# Patient Record
Sex: FEMALE | Race: WHITE | NOT HISPANIC OR LATINO | Employment: PART TIME | URBAN - METROPOLITAN AREA
[De-identification: names, ages, dates, MRNs, and addresses within clinical notes are randomized per-mention and may not be internally consistent; named-entity substitution may affect disease eponyms.]

---

## 2017-01-04 ENCOUNTER — OFFICE VISIT - HEALTHEAST (OUTPATIENT)
Dept: FAMILY MEDICINE | Facility: CLINIC | Age: 42
End: 2017-01-04

## 2017-01-04 DIAGNOSIS — N39.0 UTI (URINARY TRACT INFECTION): ICD-10-CM

## 2017-01-04 DIAGNOSIS — E11.9 DM (DIABETES MELLITUS) (H): ICD-10-CM

## 2017-01-04 LAB — HBA1C MFR BLD: 5.7 % (ref 3.5–6.1)

## 2017-02-10 ENCOUNTER — OFFICE VISIT - HEALTHEAST (OUTPATIENT)
Dept: FAMILY MEDICINE | Facility: CLINIC | Age: 42
End: 2017-02-10

## 2017-02-10 DIAGNOSIS — R30.0 DYSURIA: ICD-10-CM

## 2017-03-01 ENCOUNTER — OFFICE VISIT - HEALTHEAST (OUTPATIENT)
Dept: FAMILY MEDICINE | Facility: CLINIC | Age: 42
End: 2017-03-01

## 2017-03-01 DIAGNOSIS — N39.0 RECURRENT UTI: ICD-10-CM

## 2017-03-01 DIAGNOSIS — E11.9 DM (DIABETES MELLITUS) (H): ICD-10-CM

## 2017-03-01 DIAGNOSIS — Z12.31 VISIT FOR SCREENING MAMMOGRAM: ICD-10-CM

## 2017-03-01 DIAGNOSIS — E78.5 DYSLIPIDEMIA: ICD-10-CM

## 2017-03-01 DIAGNOSIS — R80.9 PROTEINURIA: ICD-10-CM

## 2017-03-01 DIAGNOSIS — Z00.00 ROUTINE GENERAL MEDICAL EXAMINATION AT A HEALTH CARE FACILITY: ICD-10-CM

## 2017-03-01 LAB
CHOLEST SERPL-MCNC: 83 MG/DL
FASTING STATUS PATIENT QL REPORTED: YES
HDLC SERPL-MCNC: 30 MG/DL
LDLC SERPL CALC-MCNC: 31 MG/DL
TRIGL SERPL-MCNC: 109 MG/DL

## 2017-03-01 ASSESSMENT — MIFFLIN-ST. JEOR: SCORE: 1200.59

## 2017-03-02 ENCOUNTER — AMBULATORY - HEALTHEAST (OUTPATIENT)
Dept: FAMILY MEDICINE | Facility: CLINIC | Age: 42
End: 2017-03-02

## 2017-03-02 DIAGNOSIS — N39.0 RECURRENT URINARY TRACT INFECTION: ICD-10-CM

## 2017-03-23 ENCOUNTER — COMMUNICATION - HEALTHEAST (OUTPATIENT)
Dept: FAMILY MEDICINE | Facility: CLINIC | Age: 42
End: 2017-03-23

## 2017-03-23 DIAGNOSIS — E11.9 DIABETES MELLITUS (H): ICD-10-CM

## 2017-03-23 DIAGNOSIS — I10 HTN (HYPERTENSION): ICD-10-CM

## 2017-05-10 ENCOUNTER — COMMUNICATION - HEALTHEAST (OUTPATIENT)
Dept: MAMMOGRAPHY | Facility: CLINIC | Age: 42
End: 2017-05-10

## 2017-05-10 ENCOUNTER — OFFICE VISIT - HEALTHEAST (OUTPATIENT)
Dept: FAMILY MEDICINE | Facility: CLINIC | Age: 42
End: 2017-05-10

## 2017-05-10 ENCOUNTER — HOSPITAL ENCOUNTER (OUTPATIENT)
Dept: MAMMOGRAPHY | Facility: CLINIC | Age: 42
Discharge: HOME | End: 2017-05-10
Attending: FAMILY MEDICINE

## 2017-05-10 DIAGNOSIS — Q90.9 DOWN'S SYNDROME: ICD-10-CM

## 2017-05-10 DIAGNOSIS — Z12.31 VISIT FOR SCREENING MAMMOGRAM: ICD-10-CM

## 2017-05-10 DIAGNOSIS — E78.5 DYSLIPIDEMIA: ICD-10-CM

## 2017-05-10 DIAGNOSIS — E11.9 DM (DIABETES MELLITUS) (H): ICD-10-CM

## 2017-05-10 DIAGNOSIS — R80.9 PROTEINURIA: ICD-10-CM

## 2017-05-10 DIAGNOSIS — N39.0 RECURRENT UTI: ICD-10-CM

## 2017-05-11 LAB — HBA1C MFR BLD: 5.7 % (ref 4.2–6.1)

## 2017-05-12 ENCOUNTER — HOSPITAL ENCOUNTER (OUTPATIENT)
Dept: MAMMOGRAPHY | Facility: CLINIC | Age: 42
Discharge: HOME | End: 2017-05-12
Attending: FAMILY MEDICINE

## 2017-05-12 DIAGNOSIS — N63.0 BREAST MASS: ICD-10-CM

## 2017-09-01 ENCOUNTER — COMMUNICATION - HEALTHEAST (OUTPATIENT)
Dept: FAMILY MEDICINE | Facility: CLINIC | Age: 42
End: 2017-09-01

## 2017-09-01 DIAGNOSIS — R52 PAIN: ICD-10-CM

## 2017-09-20 ENCOUNTER — OFFICE VISIT - HEALTHEAST (OUTPATIENT)
Dept: FAMILY MEDICINE | Facility: CLINIC | Age: 42
End: 2017-09-20

## 2017-09-20 ENCOUNTER — HOSPITAL ENCOUNTER (OUTPATIENT)
Dept: LAB | Age: 42
Setting detail: SPECIMEN
Discharge: HOME | End: 2017-09-20

## 2017-09-20 DIAGNOSIS — E11.9 DM (DIABETES MELLITUS) (H): ICD-10-CM

## 2017-09-20 DIAGNOSIS — N39.0 RECURRENT UTI: ICD-10-CM

## 2017-09-20 DIAGNOSIS — Z23 NEED FOR IMMUNIZATION AGAINST INFLUENZA: ICD-10-CM

## 2017-09-20 LAB — HBA1C MFR BLD: 6.3 % (ref 3.5–6.1)

## 2017-09-25 ENCOUNTER — AMBULATORY - HEALTHEAST (OUTPATIENT)
Dept: FAMILY MEDICINE | Facility: CLINIC | Age: 42
End: 2017-09-25

## 2017-09-25 DIAGNOSIS — N39.0 UTI (URINARY TRACT INFECTION): ICD-10-CM

## 2017-11-02 ENCOUNTER — OFFICE VISIT - HEALTHEAST (OUTPATIENT)
Dept: FAMILY MEDICINE | Facility: CLINIC | Age: 42
End: 2017-11-02

## 2017-11-02 ENCOUNTER — COMMUNICATION - HEALTHEAST (OUTPATIENT)
Dept: FAMILY MEDICINE | Facility: CLINIC | Age: 42
End: 2017-11-02

## 2017-11-02 DIAGNOSIS — Q90.9 DOWN'S SYNDROME: ICD-10-CM

## 2017-11-02 DIAGNOSIS — E78.5 DYSLIPIDEMIA: ICD-10-CM

## 2017-11-02 DIAGNOSIS — N39.0 UTI (URINARY TRACT INFECTION): ICD-10-CM

## 2017-11-02 DIAGNOSIS — E11.9 DM2 (DIABETES MELLITUS, TYPE 2) (H): ICD-10-CM

## 2017-11-02 DIAGNOSIS — R80.9 PROTEINURIA: ICD-10-CM

## 2017-12-01 ENCOUNTER — COMMUNICATION - HEALTHEAST (OUTPATIENT)
Dept: NURSING | Facility: CLINIC | Age: 42
End: 2017-12-01

## 2017-12-07 ENCOUNTER — COMMUNICATION - HEALTHEAST (OUTPATIENT)
Dept: NURSING | Facility: CLINIC | Age: 42
End: 2017-12-07

## 2017-12-28 ENCOUNTER — OFFICE VISIT - HEALTHEAST (OUTPATIENT)
Dept: FAMILY MEDICINE | Facility: CLINIC | Age: 42
End: 2017-12-28

## 2017-12-28 DIAGNOSIS — E11.9 DM (DIABETES MELLITUS) (H): ICD-10-CM

## 2017-12-28 LAB — HBA1C MFR BLD: 6.8 % (ref 3.5–6.1)

## 2018-04-03 ENCOUNTER — OFFICE VISIT - HEALTHEAST (OUTPATIENT)
Dept: FAMILY MEDICINE | Facility: CLINIC | Age: 43
End: 2018-04-03

## 2018-04-03 DIAGNOSIS — R35.0 URINARY FREQUENCY: ICD-10-CM

## 2018-04-03 DIAGNOSIS — E11.9 DM (DIABETES MELLITUS) (H): ICD-10-CM

## 2018-04-03 LAB
ALBUMIN UR-MCNC: NEGATIVE MG/DL
APPEARANCE UR: CLEAR
BILIRUB UR QL STRIP: NEGATIVE
COLOR UR AUTO: YELLOW
CREAT UR-MCNC: 128 MG/DL
GLUCOSE UR STRIP-MCNC: NEGATIVE MG/DL
HBA1C MFR BLD: 7.1 % (ref 3.5–6.1)
HGB UR QL STRIP: NEGATIVE
KETONES UR STRIP-MCNC: NEGATIVE MG/DL
LEUKOCYTE ESTERASE UR QL STRIP: NEGATIVE
MICROALBUMIN UR-MCNC: 0.76 MG/DL (ref 0–1.99)
MICROALBUMIN/CREAT UR: 5.9 MG/G
NITRATE UR QL: NEGATIVE
PH UR STRIP: 5.5 [PH] (ref 5–8)
SP GR UR STRIP: 1.02 (ref 1–1.03)
UROBILINOGEN UR STRIP-ACNC: NORMAL

## 2018-05-30 ENCOUNTER — COMMUNICATION - HEALTHEAST (OUTPATIENT)
Dept: FAMILY MEDICINE | Facility: CLINIC | Age: 43
End: 2018-05-30

## 2018-05-30 DIAGNOSIS — E11.9 DIABETES (H): ICD-10-CM

## 2018-08-22 ENCOUNTER — OFFICE VISIT - HEALTHEAST (OUTPATIENT)
Dept: FAMILY MEDICINE | Facility: CLINIC | Age: 43
End: 2018-08-22

## 2018-08-22 DIAGNOSIS — E11.9 DM (DIABETES MELLITUS) (H): ICD-10-CM

## 2018-08-22 DIAGNOSIS — I10 HTN (HYPERTENSION): ICD-10-CM

## 2018-08-22 DIAGNOSIS — E66.9 OBESITY: ICD-10-CM

## 2018-08-22 DIAGNOSIS — E78.5 DYSLIPIDEMIA: ICD-10-CM

## 2018-08-22 LAB
ALBUMIN SERPL-MCNC: 4.1 G/DL (ref 3.5–5)
ALP SERPL-CCNC: 61 U/L (ref 45–120)
ALT SERPL W P-5'-P-CCNC: 22 U/L (ref 0–45)
ANION GAP SERPL CALCULATED.3IONS-SCNC: 8 MMOL/L (ref 5–18)
AST SERPL W P-5'-P-CCNC: 20 U/L (ref 0–40)
BILIRUB SERPL-MCNC: 0.6 MG/DL (ref 0–1)
BUN SERPL-MCNC: 12 MG/DL (ref 8–22)
CALCIUM SERPL-MCNC: 9.4 MG/DL (ref 8.5–10.5)
CHLORIDE BLD-SCNC: 104 MMOL/L (ref 98–107)
CHOLEST SERPL-MCNC: 101 MG/DL
CO2 SERPL-SCNC: 27 MMOL/L (ref 22–31)
CREAT SERPL-MCNC: 0.67 MG/DL (ref 0.6–1.1)
FASTING STATUS PATIENT QL REPORTED: YES
GFR SERPL CREATININE-BSD FRML MDRD: >60 ML/MIN/1.73M2
GLUCOSE BLD-MCNC: 168 MG/DL (ref 70–125)
HBA1C MFR BLD: 6.9 % (ref 3.5–6.1)
HDLC SERPL-MCNC: 31 MG/DL
LDLC SERPL CALC-MCNC: 39 MG/DL
POTASSIUM BLD-SCNC: 4.3 MMOL/L (ref 3.5–5)
PROT SERPL-MCNC: 6.6 G/DL (ref 6–8)
SODIUM SERPL-SCNC: 139 MMOL/L (ref 136–145)
TRIGL SERPL-MCNC: 156 MG/DL

## 2018-10-25 ENCOUNTER — COMMUNICATION - HEALTHEAST (OUTPATIENT)
Dept: FAMILY MEDICINE | Facility: CLINIC | Age: 43
End: 2018-10-25

## 2018-10-25 DIAGNOSIS — R80.9 PROTEINURIA: ICD-10-CM

## 2018-12-19 ENCOUNTER — COMMUNICATION - HEALTHEAST (OUTPATIENT)
Dept: FAMILY MEDICINE | Facility: CLINIC | Age: 43
End: 2018-12-19

## 2018-12-19 DIAGNOSIS — E11.9 DIABETES (H): ICD-10-CM

## 2019-01-16 ENCOUNTER — OFFICE VISIT - HEALTHEAST (OUTPATIENT)
Dept: FAMILY MEDICINE | Facility: CLINIC | Age: 44
End: 2019-01-16

## 2019-01-16 DIAGNOSIS — E78.5 DYSLIPIDEMIA: ICD-10-CM

## 2019-01-16 DIAGNOSIS — E11.9 DM (DIABETES MELLITUS) (H): ICD-10-CM

## 2019-01-16 DIAGNOSIS — Z23 ENCOUNTER FOR ADMINISTRATION OF VACCINE: ICD-10-CM

## 2019-01-16 DIAGNOSIS — Q90.9 DOWN'S SYNDROME: ICD-10-CM

## 2019-01-16 LAB — HBA1C MFR BLD: 6.8 % (ref 3.5–6.1)

## 2019-01-20 ENCOUNTER — COMMUNICATION - HEALTHEAST (OUTPATIENT)
Dept: FAMILY MEDICINE | Facility: CLINIC | Age: 44
End: 2019-01-20

## 2019-01-20 DIAGNOSIS — R52 PAIN: ICD-10-CM

## 2019-01-21 ENCOUNTER — COMMUNICATION - HEALTHEAST (OUTPATIENT)
Dept: FAMILY MEDICINE | Facility: CLINIC | Age: 44
End: 2019-01-21

## 2019-01-21 DIAGNOSIS — E78.5 DYSLIPIDEMIA: ICD-10-CM

## 2019-01-24 ENCOUNTER — COMMUNICATION - HEALTHEAST (OUTPATIENT)
Dept: FAMILY MEDICINE | Facility: CLINIC | Age: 44
End: 2019-01-24

## 2019-01-24 DIAGNOSIS — E11.9 TYPE 2 DIABETES MELLITUS WITHOUT COMPLICATION, WITHOUT LONG-TERM CURRENT USE OF INSULIN (H): ICD-10-CM

## 2019-02-06 ENCOUNTER — AMBULATORY - HEALTHEAST (OUTPATIENT)
Dept: OTHER | Facility: CLINIC | Age: 44
End: 2019-02-06

## 2019-02-06 ENCOUNTER — AMBULATORY - HEALTHEAST (OUTPATIENT)
Dept: PODIATRY | Facility: CLINIC | Age: 44
End: 2019-02-06

## 2019-02-06 ENCOUNTER — OFFICE VISIT - HEALTHEAST (OUTPATIENT)
Dept: PODIATRY | Facility: CLINIC | Age: 44
End: 2019-02-06

## 2019-02-06 DIAGNOSIS — B35.1 NAIL FUNGUS: ICD-10-CM

## 2019-02-06 DIAGNOSIS — E11.9 TYPE 2 DIABETES MELLITUS WITHOUT COMPLICATION, WITHOUT LONG-TERM CURRENT USE OF INSULIN (H): ICD-10-CM

## 2019-02-06 DIAGNOSIS — L60.2 ONYCHAUXIS: ICD-10-CM

## 2019-02-06 DIAGNOSIS — M20.10 HALLUX VALGUS, UNSPECIFIED LATERALITY: ICD-10-CM

## 2019-02-06 DIAGNOSIS — M21.6X1 PRONATION DEFORMITY OF BOTH FEET: ICD-10-CM

## 2019-02-06 DIAGNOSIS — M21.6X2 PRONATION DEFORMITY OF BOTH FEET: ICD-10-CM

## 2019-02-25 ENCOUNTER — RECORDS - HEALTHEAST (OUTPATIENT)
Dept: ADMINISTRATIVE | Facility: OTHER | Age: 44
End: 2019-02-25

## 2019-02-27 ENCOUNTER — COMMUNICATION - HEALTHEAST (OUTPATIENT)
Dept: FAMILY MEDICINE | Facility: CLINIC | Age: 44
End: 2019-02-27

## 2019-02-27 DIAGNOSIS — E11.9 DM2 (DIABETES MELLITUS, TYPE 2) (H): ICD-10-CM

## 2019-04-03 ENCOUNTER — COMMUNICATION - HEALTHEAST (OUTPATIENT)
Dept: FAMILY MEDICINE | Facility: CLINIC | Age: 44
End: 2019-04-03

## 2019-04-03 DIAGNOSIS — E11.9 DM2 (DIABETES MELLITUS, TYPE 2) (H): ICD-10-CM

## 2019-04-03 DIAGNOSIS — E11.9 DM (DIABETES MELLITUS) (H): ICD-10-CM

## 2019-04-05 ENCOUNTER — COMMUNICATION - HEALTHEAST (OUTPATIENT)
Dept: FAMILY MEDICINE | Facility: CLINIC | Age: 44
End: 2019-04-05

## 2019-04-05 DIAGNOSIS — E11.9 DM (DIABETES MELLITUS) (H): ICD-10-CM

## 2019-04-17 ENCOUNTER — OFFICE VISIT - HEALTHEAST (OUTPATIENT)
Dept: FAMILY MEDICINE | Facility: CLINIC | Age: 44
End: 2019-04-17

## 2019-04-17 DIAGNOSIS — E11.9 DM (DIABETES MELLITUS) (H): ICD-10-CM

## 2019-04-17 DIAGNOSIS — E78.5 DYSLIPIDEMIA: ICD-10-CM

## 2019-04-17 DIAGNOSIS — I10 ESSENTIAL HYPERTENSION: ICD-10-CM

## 2019-04-17 DIAGNOSIS — Z12.4 CERVICAL CANCER SCREENING: ICD-10-CM

## 2019-04-17 DIAGNOSIS — Q90.9 DOWN'S SYNDROME: ICD-10-CM

## 2019-04-17 DIAGNOSIS — F70 MILD INTELLECTUAL DISABILITIES: ICD-10-CM

## 2019-04-17 DIAGNOSIS — Z12.31 ENCOUNTER FOR SCREENING MAMMOGRAM FOR BREAST CANCER: ICD-10-CM

## 2019-04-17 DIAGNOSIS — Z00.00 ROUTINE GENERAL MEDICAL EXAMINATION AT A HEALTH CARE FACILITY: ICD-10-CM

## 2019-04-17 LAB
ALBUMIN SERPL-MCNC: 4 G/DL (ref 3.5–5)
ALP SERPL-CCNC: 57 U/L (ref 45–120)
ALT SERPL W P-5'-P-CCNC: 27 U/L (ref 0–45)
ANION GAP SERPL CALCULATED.3IONS-SCNC: 10 MMOL/L (ref 5–18)
AST SERPL W P-5'-P-CCNC: 19 U/L (ref 0–40)
BILIRUB SERPL-MCNC: 0.5 MG/DL (ref 0–1)
BUN SERPL-MCNC: 11 MG/DL (ref 8–22)
CALCIUM SERPL-MCNC: 9.3 MG/DL (ref 8.5–10.5)
CHLORIDE BLD-SCNC: 102 MMOL/L (ref 98–107)
CHOLEST SERPL-MCNC: 95 MG/DL
CO2 SERPL-SCNC: 26 MMOL/L (ref 22–31)
CREAT SERPL-MCNC: 0.7 MG/DL (ref 0.6–1.1)
CREAT UR-MCNC: 82.3 MG/DL
FASTING STATUS PATIENT QL REPORTED: YES
GFR SERPL CREATININE-BSD FRML MDRD: >60 ML/MIN/1.73M2
GLUCOSE BLD-MCNC: 162 MG/DL (ref 70–125)
HBA1C MFR BLD: 7.2 % (ref 3.5–6.1)
HDLC SERPL-MCNC: 28 MG/DL
LDLC SERPL CALC-MCNC: 34 MG/DL
MICROALBUMIN UR-MCNC: 1.15 MG/DL (ref 0–1.99)
MICROALBUMIN/CREAT UR: 14 MG/G
POTASSIUM BLD-SCNC: 4.2 MMOL/L (ref 3.5–5)
PROT SERPL-MCNC: 6.7 G/DL (ref 6–8)
SODIUM SERPL-SCNC: 138 MMOL/L (ref 136–145)
TRIGL SERPL-MCNC: 166 MG/DL

## 2019-04-17 ASSESSMENT — MIFFLIN-ST. JEOR: SCORE: 1250.48

## 2019-04-18 ENCOUNTER — COMMUNICATION - HEALTHEAST (OUTPATIENT)
Dept: NURSING | Facility: CLINIC | Age: 44
End: 2019-04-18

## 2019-04-18 LAB
HPV SOURCE: NORMAL
HUMAN PAPILLOMA VIRUS 16 DNA: NEGATIVE
HUMAN PAPILLOMA VIRUS 18 DNA: NEGATIVE
HUMAN PAPILLOMA VIRUS FINAL DIAGNOSIS: NORMAL
HUMAN PAPILLOMA VIRUS OTHER HR: NEGATIVE
SPECIMEN DESCRIPTION: NORMAL

## 2019-04-22 ENCOUNTER — COMMUNICATION - HEALTHEAST (OUTPATIENT)
Dept: NURSING | Facility: CLINIC | Age: 44
End: 2019-04-22

## 2019-04-25 LAB
BKR LAB AP ABNORMAL BLEEDING: NO
BKR LAB AP BIRTH CONTROL/HORMONES: NORMAL
BKR LAB AP CERVICAL APPEARANCE: NORMAL
BKR LAB AP GYN ADEQUACY: NORMAL
BKR LAB AP GYN INTERPRETATION: NORMAL
BKR LAB AP HPV REFLEX: NORMAL
BKR LAB AP LMP: NORMAL
BKR LAB AP PATIENT STATUS: NORMAL
BKR LAB AP PREVIOUS ABNORMAL: NORMAL
BKR LAB AP PREVIOUS NORMAL: 2013
HIGH RISK?: NO
PATH REPORT.COMMENTS IMP SPEC: NORMAL
RESULT FLAG (HE HISTORICAL CONVERSION): NORMAL

## 2019-04-29 ENCOUNTER — COMMUNICATION - HEALTHEAST (OUTPATIENT)
Dept: NURSING | Facility: CLINIC | Age: 44
End: 2019-04-29

## 2019-05-13 ENCOUNTER — OFFICE VISIT - HEALTHEAST (OUTPATIENT)
Dept: FAMILY MEDICINE | Facility: CLINIC | Age: 44
End: 2019-05-13

## 2019-05-13 DIAGNOSIS — R35.0 INCREASED FREQUENCY OF URINATION: ICD-10-CM

## 2019-05-14 ENCOUNTER — AMBULATORY - HEALTHEAST (OUTPATIENT)
Dept: LAB | Facility: CLINIC | Age: 44
End: 2019-05-14

## 2019-05-14 DIAGNOSIS — R35.0 INCREASED FREQUENCY OF URINATION: ICD-10-CM

## 2019-05-14 LAB
ALBUMIN UR-MCNC: NEGATIVE MG/DL
APPEARANCE UR: ABNORMAL
BACTERIA #/AREA URNS HPF: ABNORMAL HPF
BILIRUB UR QL STRIP: NEGATIVE
COLOR UR AUTO: YELLOW
GLUCOSE UR STRIP-MCNC: NEGATIVE MG/DL
HGB UR QL STRIP: NEGATIVE
KETONES UR STRIP-MCNC: NEGATIVE MG/DL
LEUKOCYTE ESTERASE UR QL STRIP: ABNORMAL
NITRATE UR QL: POSITIVE
PH UR STRIP: 7 [PH] (ref 5–8)
RBC #/AREA URNS AUTO: ABNORMAL HPF
SP GR UR STRIP: 1.01 (ref 1–1.03)
SQUAMOUS #/AREA URNS AUTO: ABNORMAL LPF
UROBILINOGEN UR STRIP-ACNC: ABNORMAL
WBC #/AREA URNS AUTO: ABNORMAL HPF

## 2019-05-16 ENCOUNTER — AMBULATORY - HEALTHEAST (OUTPATIENT)
Dept: FAMILY MEDICINE | Facility: CLINIC | Age: 44
End: 2019-05-16

## 2019-05-16 DIAGNOSIS — N30.00 ACUTE CYSTITIS WITHOUT HEMATURIA: ICD-10-CM

## 2019-05-16 LAB — BACTERIA SPEC CULT: ABNORMAL

## 2019-05-30 ENCOUNTER — COMMUNICATION - HEALTHEAST (OUTPATIENT)
Dept: FAMILY MEDICINE | Facility: CLINIC | Age: 44
End: 2019-05-30

## 2019-05-30 ENCOUNTER — HOSPITAL ENCOUNTER (OUTPATIENT)
Dept: MAMMOGRAPHY | Facility: CLINIC | Age: 44
Discharge: HOME | End: 2019-05-30
Attending: FAMILY MEDICINE

## 2019-05-30 DIAGNOSIS — Z12.31 ENCOUNTER FOR SCREENING MAMMOGRAM FOR BREAST CANCER: ICD-10-CM

## 2019-05-30 DIAGNOSIS — E11.9 DM2 (DIABETES MELLITUS, TYPE 2) (H): ICD-10-CM

## 2019-06-06 ENCOUNTER — AMBULATORY - HEALTHEAST (OUTPATIENT)
Dept: NURSING | Facility: CLINIC | Age: 44
End: 2019-06-06

## 2019-06-06 ENCOUNTER — COMMUNICATION - HEALTHEAST (OUTPATIENT)
Dept: NURSING | Facility: CLINIC | Age: 44
End: 2019-06-06

## 2019-06-06 DIAGNOSIS — Z71.89 COUNSELING AND COORDINATION OF CARE: ICD-10-CM

## 2019-06-11 ENCOUNTER — AMBULATORY - HEALTHEAST (OUTPATIENT)
Dept: NURSING | Facility: CLINIC | Age: 44
End: 2019-06-11

## 2019-06-11 ENCOUNTER — AMBULATORY - HEALTHEAST (OUTPATIENT)
Dept: CARE COORDINATION | Facility: CLINIC | Age: 44
End: 2019-06-11

## 2019-06-11 ENCOUNTER — COMMUNICATION - HEALTHEAST (OUTPATIENT)
Dept: NURSING | Facility: CLINIC | Age: 44
End: 2019-06-11

## 2019-06-11 ENCOUNTER — OFFICE VISIT - HEALTHEAST (OUTPATIENT)
Dept: FAMILY MEDICINE | Facility: CLINIC | Age: 44
End: 2019-06-11

## 2019-06-11 DIAGNOSIS — N39.0 URINARY TRACT INFECTION WITHOUT HEMATURIA, SITE UNSPECIFIED: ICD-10-CM

## 2019-06-11 DIAGNOSIS — Z71.89 COUNSELING AND COORDINATION OF CARE: ICD-10-CM

## 2019-06-24 ENCOUNTER — COMMUNICATION - HEALTHEAST (OUTPATIENT)
Dept: NURSING | Facility: CLINIC | Age: 44
End: 2019-06-24

## 2019-06-26 ENCOUNTER — OFFICE VISIT - HEALTHEAST (OUTPATIENT)
Dept: FAMILY MEDICINE | Facility: CLINIC | Age: 44
End: 2019-06-26

## 2019-06-26 DIAGNOSIS — N39.0 RECURRENT UTI: ICD-10-CM

## 2019-06-28 ENCOUNTER — AMBULATORY - HEALTHEAST (OUTPATIENT)
Dept: FAMILY MEDICINE | Facility: CLINIC | Age: 44
End: 2019-06-28

## 2019-06-28 DIAGNOSIS — N39.0 URINARY TRACT INFECTION WITHOUT HEMATURIA, SITE UNSPECIFIED: ICD-10-CM

## 2019-06-28 LAB — BACTERIA SPEC CULT: ABNORMAL

## 2019-07-04 ENCOUNTER — COMMUNICATION - HEALTHEAST (OUTPATIENT)
Dept: SCHEDULING | Facility: CLINIC | Age: 44
End: 2019-07-04

## 2019-07-08 ENCOUNTER — COMMUNICATION - HEALTHEAST (OUTPATIENT)
Dept: NURSING | Facility: CLINIC | Age: 44
End: 2019-07-08

## 2019-07-11 ENCOUNTER — COMMUNICATION - HEALTHEAST (OUTPATIENT)
Dept: NURSING | Facility: CLINIC | Age: 44
End: 2019-07-11

## 2019-07-23 ENCOUNTER — COMMUNICATION - HEALTHEAST (OUTPATIENT)
Dept: NURSING | Facility: CLINIC | Age: 44
End: 2019-07-23

## 2019-08-14 ENCOUNTER — OFFICE VISIT - HEALTHEAST (OUTPATIENT)
Dept: FAMILY MEDICINE | Facility: CLINIC | Age: 44
End: 2019-08-14

## 2019-08-14 DIAGNOSIS — N30.00 ACUTE CYSTITIS WITHOUT HEMATURIA: ICD-10-CM

## 2019-08-14 DIAGNOSIS — E11.9 DM (DIABETES MELLITUS) (H): ICD-10-CM

## 2019-08-14 LAB — HBA1C MFR BLD: 6.5 % (ref 3.5–6)

## 2019-08-14 ASSESSMENT — MIFFLIN-ST. JEOR: SCORE: 1213.63

## 2019-08-16 LAB — BACTERIA SPEC CULT: ABNORMAL

## 2019-08-20 ENCOUNTER — AMBULATORY - HEALTHEAST (OUTPATIENT)
Dept: FAMILY MEDICINE | Facility: CLINIC | Age: 44
End: 2019-08-20

## 2019-08-20 DIAGNOSIS — N39.0 URINARY TRACT INFECTION WITHOUT HEMATURIA, SITE UNSPECIFIED: ICD-10-CM

## 2019-08-23 ENCOUNTER — COMMUNICATION - HEALTHEAST (OUTPATIENT)
Dept: NURSING | Facility: CLINIC | Age: 44
End: 2019-08-23

## 2019-08-26 ENCOUNTER — COMMUNICATION - HEALTHEAST (OUTPATIENT)
Dept: NURSING | Facility: CLINIC | Age: 44
End: 2019-08-26

## 2019-08-28 ENCOUNTER — RECORDS - HEALTHEAST (OUTPATIENT)
Dept: ADMINISTRATIVE | Facility: OTHER | Age: 44
End: 2019-08-28

## 2019-08-29 ENCOUNTER — COMMUNICATION - HEALTHEAST (OUTPATIENT)
Dept: NURSING | Facility: CLINIC | Age: 44
End: 2019-08-29

## 2019-08-30 ENCOUNTER — COMMUNICATION - HEALTHEAST (OUTPATIENT)
Dept: CARE COORDINATION | Facility: CLINIC | Age: 44
End: 2019-08-30

## 2019-09-03 ENCOUNTER — COMMUNICATION - HEALTHEAST (OUTPATIENT)
Dept: NURSING | Facility: CLINIC | Age: 44
End: 2019-09-03

## 2019-09-03 ENCOUNTER — COMMUNICATION - HEALTHEAST (OUTPATIENT)
Dept: FAMILY MEDICINE | Facility: CLINIC | Age: 44
End: 2019-09-03

## 2019-09-03 DIAGNOSIS — E11.9 DIABETES (H): ICD-10-CM

## 2019-09-05 ENCOUNTER — OFFICE VISIT - HEALTHEAST (OUTPATIENT)
Dept: FAMILY MEDICINE | Facility: CLINIC | Age: 44
End: 2019-09-05

## 2019-09-05 DIAGNOSIS — N30.00 ACUTE CYSTITIS WITHOUT HEMATURIA: ICD-10-CM

## 2019-09-05 LAB
ALBUMIN UR-MCNC: NEGATIVE MG/DL
APPEARANCE UR: CLEAR
BILIRUB UR QL STRIP: NEGATIVE
COLOR UR AUTO: YELLOW
GLUCOSE UR STRIP-MCNC: NEGATIVE MG/DL
HGB UR QL STRIP: NEGATIVE
KETONES UR STRIP-MCNC: NEGATIVE MG/DL
LEUKOCYTE ESTERASE UR QL STRIP: NEGATIVE
NITRATE UR QL: NEGATIVE
PH UR STRIP: 7 [PH] (ref 5–8)
SP GR UR STRIP: 1.02 (ref 1–1.03)
UROBILINOGEN UR STRIP-ACNC: NORMAL

## 2019-09-07 LAB — BACTERIA SPEC CULT: NORMAL

## 2019-09-09 ENCOUNTER — RECORDS - HEALTHEAST (OUTPATIENT)
Dept: HEALTH INFORMATION MANAGEMENT | Facility: CLINIC | Age: 44
End: 2019-09-09

## 2019-09-10 ENCOUNTER — COMMUNICATION - HEALTHEAST (OUTPATIENT)
Dept: FAMILY MEDICINE | Facility: CLINIC | Age: 44
End: 2019-09-10

## 2019-10-06 ENCOUNTER — COMMUNICATION - HEALTHEAST (OUTPATIENT)
Dept: FAMILY MEDICINE | Facility: CLINIC | Age: 44
End: 2019-10-06

## 2019-10-06 DIAGNOSIS — E78.5 DYSLIPIDEMIA: ICD-10-CM

## 2019-10-15 ENCOUNTER — COMMUNICATION - HEALTHEAST (OUTPATIENT)
Dept: FAMILY MEDICINE | Facility: CLINIC | Age: 44
End: 2019-10-15

## 2019-10-15 DIAGNOSIS — R80.9 PROTEINURIA: ICD-10-CM

## 2019-10-29 ENCOUNTER — COMMUNICATION - HEALTHEAST (OUTPATIENT)
Dept: FAMILY MEDICINE | Facility: CLINIC | Age: 44
End: 2019-10-29

## 2019-10-29 DIAGNOSIS — Q90.9 DOWN'S SYNDROME: ICD-10-CM

## 2019-11-01 ENCOUNTER — RECORDS - HEALTHEAST (OUTPATIENT)
Dept: GENERAL RADIOLOGY | Facility: CLINIC | Age: 44
End: 2019-11-01

## 2019-11-01 DIAGNOSIS — Q90.9 DOWN SYNDROME, UNSPECIFIED: ICD-10-CM

## 2019-11-04 ENCOUNTER — COMMUNICATION - HEALTHEAST (OUTPATIENT)
Dept: FAMILY MEDICINE | Facility: CLINIC | Age: 44
End: 2019-11-04

## 2019-11-05 ENCOUNTER — COMMUNICATION - HEALTHEAST (OUTPATIENT)
Dept: CARE COORDINATION | Facility: CLINIC | Age: 44
End: 2019-11-05

## 2019-11-05 ENCOUNTER — COMMUNICATION - HEALTHEAST (OUTPATIENT)
Dept: NURSING | Facility: CLINIC | Age: 44
End: 2019-11-05

## 2019-11-06 ENCOUNTER — COMMUNICATION - HEALTHEAST (OUTPATIENT)
Dept: NURSING | Facility: CLINIC | Age: 44
End: 2019-11-06

## 2019-11-21 ENCOUNTER — COMMUNICATION - HEALTHEAST (OUTPATIENT)
Dept: LAB | Facility: CLINIC | Age: 44
End: 2019-11-21

## 2019-11-21 DIAGNOSIS — E11.9 TYPE 2 DIABETES MELLITUS WITHOUT COMPLICATION, WITHOUT LONG-TERM CURRENT USE OF INSULIN (H): ICD-10-CM

## 2019-11-21 DIAGNOSIS — N39.0 RECURRENT UTI: ICD-10-CM

## 2019-11-27 ENCOUNTER — AMBULATORY - HEALTHEAST (OUTPATIENT)
Dept: LAB | Facility: CLINIC | Age: 44
End: 2019-11-27

## 2019-11-27 DIAGNOSIS — E11.9 TYPE 2 DIABETES MELLITUS WITHOUT COMPLICATION, WITHOUT LONG-TERM CURRENT USE OF INSULIN (H): ICD-10-CM

## 2019-11-27 DIAGNOSIS — N39.0 RECURRENT UTI: ICD-10-CM

## 2019-11-27 LAB — HBA1C MFR BLD: 6.1 % (ref 3.5–6)

## 2019-11-28 LAB — BACTERIA SPEC CULT: NO GROWTH

## 2020-02-05 ENCOUNTER — OFFICE VISIT - HEALTHEAST (OUTPATIENT)
Dept: FAMILY MEDICINE | Facility: CLINIC | Age: 45
End: 2020-02-05

## 2020-02-05 DIAGNOSIS — N39.0 RECURRENT UTI: ICD-10-CM

## 2020-02-05 DIAGNOSIS — E11.9 TYPE 2 DIABETES MELLITUS WITHOUT COMPLICATION, WITHOUT LONG-TERM CURRENT USE OF INSULIN (H): ICD-10-CM

## 2020-02-05 LAB
ALBUMIN UR-MCNC: NEGATIVE MG/DL
APPEARANCE UR: CLEAR
BILIRUB UR QL STRIP: NEGATIVE
COLOR UR AUTO: YELLOW
CREAT UR-MCNC: 103.8 MG/DL
GLUCOSE UR STRIP-MCNC: NEGATIVE MG/DL
HGB UR QL STRIP: NEGATIVE
KETONES UR STRIP-MCNC: NEGATIVE MG/DL
LEUKOCYTE ESTERASE UR QL STRIP: NEGATIVE
MICROALBUMIN UR-MCNC: 0.54 MG/DL (ref 0–1.99)
MICROALBUMIN/CREAT UR: 5.2 MG/G
NITRATE UR QL: NEGATIVE
PH UR STRIP: 6.5 [PH] (ref 5–8)
SP GR UR STRIP: 1.02 (ref 1–1.03)
UROBILINOGEN UR STRIP-ACNC: NORMAL

## 2020-02-05 ASSESSMENT — MIFFLIN-ST. JEOR: SCORE: 1234.04

## 2020-02-07 ENCOUNTER — COMMUNICATION - HEALTHEAST (OUTPATIENT)
Dept: FAMILY MEDICINE | Facility: CLINIC | Age: 45
End: 2020-02-07

## 2020-02-07 DIAGNOSIS — N30.00 ACUTE CYSTITIS WITHOUT HEMATURIA: ICD-10-CM

## 2020-02-08 LAB — BACTERIA SPEC CULT: ABNORMAL

## 2020-03-26 ENCOUNTER — COMMUNICATION - HEALTHEAST (OUTPATIENT)
Dept: PODIATRY | Facility: CLINIC | Age: 45
End: 2020-03-26

## 2020-03-27 ENCOUNTER — COMMUNICATION - HEALTHEAST (OUTPATIENT)
Dept: OTHER | Facility: CLINIC | Age: 45
End: 2020-03-27

## 2020-03-27 ENCOUNTER — AMBULATORY - HEALTHEAST (OUTPATIENT)
Dept: PODIATRY | Facility: CLINIC | Age: 45
End: 2020-03-27

## 2020-03-27 DIAGNOSIS — M21.6X1 PRONATION DEFORMITY OF BOTH FEET: ICD-10-CM

## 2020-03-27 DIAGNOSIS — E11.9 TYPE 2 DIABETES MELLITUS WITHOUT COMPLICATION, WITHOUT LONG-TERM CURRENT USE OF INSULIN (H): ICD-10-CM

## 2020-03-27 DIAGNOSIS — M21.6X2 PRONATION DEFORMITY OF BOTH FEET: ICD-10-CM

## 2020-04-02 ENCOUNTER — OFFICE VISIT - HEALTHEAST (OUTPATIENT)
Dept: FAMILY MEDICINE | Facility: CLINIC | Age: 45
End: 2020-04-02

## 2020-04-02 DIAGNOSIS — E78.5 DYSLIPIDEMIA: ICD-10-CM

## 2020-04-02 DIAGNOSIS — E11.9 TYPE 2 DIABETES MELLITUS WITHOUT COMPLICATION, WITHOUT LONG-TERM CURRENT USE OF INSULIN (H): ICD-10-CM

## 2020-04-02 DIAGNOSIS — N39.0 RECURRENT UTI: ICD-10-CM

## 2020-04-03 ENCOUNTER — COMMUNICATION - HEALTHEAST (OUTPATIENT)
Dept: FAMILY MEDICINE | Facility: CLINIC | Age: 45
End: 2020-04-03

## 2020-04-03 DIAGNOSIS — E11.9 DM2 (DIABETES MELLITUS, TYPE 2) (H): ICD-10-CM

## 2020-05-26 ENCOUNTER — COMMUNICATION - HEALTHEAST (OUTPATIENT)
Dept: LAB | Facility: CLINIC | Age: 45
End: 2020-05-26

## 2020-05-26 ENCOUNTER — OFFICE VISIT - HEALTHEAST (OUTPATIENT)
Dept: FAMILY MEDICINE | Facility: CLINIC | Age: 45
End: 2020-05-26

## 2020-05-26 DIAGNOSIS — N39.0 URINARY TRACT INFECTION WITHOUT HEMATURIA, SITE UNSPECIFIED: ICD-10-CM

## 2020-05-26 DIAGNOSIS — H61.21 IMPACTED CERUMEN OF RIGHT EAR: ICD-10-CM

## 2020-05-26 DIAGNOSIS — Q90.9 DOWN'S SYNDROME: ICD-10-CM

## 2020-05-26 DIAGNOSIS — H93.8X1 PLUGGED FEELING IN EAR, RIGHT: ICD-10-CM

## 2020-05-26 DIAGNOSIS — R35.0 URINE FREQUENCY: ICD-10-CM

## 2020-05-26 DIAGNOSIS — E11.9 TYPE 2 DIABETES MELLITUS WITHOUT COMPLICATION, WITHOUT LONG-TERM CURRENT USE OF INSULIN (H): ICD-10-CM

## 2020-05-26 LAB
ALBUMIN UR-MCNC: NEGATIVE MG/DL
APPEARANCE UR: CLEAR
BILIRUB UR QL STRIP: NEGATIVE
COLOR UR AUTO: YELLOW
GLUCOSE UR STRIP-MCNC: NEGATIVE MG/DL
HBA1C MFR BLD: 6 % (ref 3.5–6)
HGB UR QL STRIP: NEGATIVE
KETONES UR STRIP-MCNC: NEGATIVE MG/DL
LEUKOCYTE ESTERASE UR QL STRIP: NEGATIVE
NITRATE UR QL: NEGATIVE
PH UR STRIP: 7 [PH] (ref 5–8)
SP GR UR STRIP: 1.02 (ref 1–1.03)
UROBILINOGEN UR STRIP-ACNC: NORMAL

## 2020-05-27 ENCOUNTER — COMMUNICATION - HEALTHEAST (OUTPATIENT)
Dept: FAMILY MEDICINE | Facility: CLINIC | Age: 45
End: 2020-05-27

## 2020-05-27 LAB — BACTERIA SPEC CULT: NO GROWTH

## 2020-06-02 ENCOUNTER — OFFICE VISIT - HEALTHEAST (OUTPATIENT)
Dept: FAMILY MEDICINE | Facility: CLINIC | Age: 45
End: 2020-06-02

## 2020-06-02 ENCOUNTER — COMMUNICATION - HEALTHEAST (OUTPATIENT)
Dept: SCHEDULING | Facility: CLINIC | Age: 45
End: 2020-06-02

## 2020-06-02 DIAGNOSIS — H61.21 IMPACTED CERUMEN OF RIGHT EAR: ICD-10-CM

## 2020-06-03 ENCOUNTER — COMMUNICATION - HEALTHEAST (OUTPATIENT)
Dept: FAMILY MEDICINE | Facility: CLINIC | Age: 45
End: 2020-06-03

## 2020-06-03 DIAGNOSIS — E11.9 DIABETES (H): ICD-10-CM

## 2020-06-08 ENCOUNTER — COMMUNICATION - HEALTHEAST (OUTPATIENT)
Dept: FAMILY MEDICINE | Facility: CLINIC | Age: 45
End: 2020-06-08

## 2020-06-08 DIAGNOSIS — E11.9 DIABETES (H): ICD-10-CM

## 2020-06-12 ENCOUNTER — OFFICE VISIT - HEALTHEAST (OUTPATIENT)
Dept: FAMILY MEDICINE | Facility: CLINIC | Age: 45
End: 2020-06-12

## 2020-06-12 DIAGNOSIS — H61.21 IMPACTED CERUMEN OF RIGHT EAR: ICD-10-CM

## 2020-06-24 ENCOUNTER — COMMUNICATION - HEALTHEAST (OUTPATIENT)
Dept: PEDIATRICS | Facility: CLINIC | Age: 45
End: 2020-06-24

## 2020-06-24 DIAGNOSIS — E11.9 DM2 (DIABETES MELLITUS, TYPE 2) (H): ICD-10-CM

## 2020-08-06 ENCOUNTER — OFFICE VISIT - HEALTHEAST (OUTPATIENT)
Dept: FAMILY MEDICINE | Facility: CLINIC | Age: 45
End: 2020-08-06

## 2020-08-06 DIAGNOSIS — B35.1 ONYCHOMYCOSIS: ICD-10-CM

## 2020-08-14 ENCOUNTER — COMMUNICATION - HEALTHEAST (OUTPATIENT)
Dept: FAMILY MEDICINE | Facility: CLINIC | Age: 45
End: 2020-08-14

## 2020-09-02 ENCOUNTER — RECORDS - HEALTHEAST (OUTPATIENT)
Dept: ADMINISTRATIVE | Facility: OTHER | Age: 45
End: 2020-09-02

## 2020-09-02 LAB — RETINOPATHY: NEGATIVE

## 2020-09-10 ENCOUNTER — RECORDS - HEALTHEAST (OUTPATIENT)
Dept: HEALTH INFORMATION MANAGEMENT | Facility: CLINIC | Age: 45
End: 2020-09-10

## 2020-09-29 ENCOUNTER — COMMUNICATION - HEALTHEAST (OUTPATIENT)
Dept: FAMILY MEDICINE | Facility: CLINIC | Age: 45
End: 2020-09-29

## 2020-09-29 DIAGNOSIS — E78.5 DYSLIPIDEMIA: ICD-10-CM

## 2021-01-13 ENCOUNTER — RECORDS - HEALTHEAST (OUTPATIENT)
Dept: ADMINISTRATIVE | Facility: OTHER | Age: 46
End: 2021-01-13

## 2021-01-20 ENCOUNTER — RECORDS - HEALTHEAST (OUTPATIENT)
Dept: ADMINISTRATIVE | Facility: OTHER | Age: 46
End: 2021-01-20

## 2021-02-09 ENCOUNTER — OFFICE VISIT - HEALTHEAST (OUTPATIENT)
Dept: FAMILY MEDICINE | Facility: CLINIC | Age: 46
End: 2021-02-09

## 2021-02-09 DIAGNOSIS — E11.9 DM (DIABETES MELLITUS) (H): ICD-10-CM

## 2021-02-09 DIAGNOSIS — E78.5 DYSLIPIDEMIA: ICD-10-CM

## 2021-02-09 DIAGNOSIS — B35.1 ONYCHOMYCOSIS: ICD-10-CM

## 2021-02-09 DIAGNOSIS — N39.0 URINARY TRACT INFECTION WITHOUT HEMATURIA, SITE UNSPECIFIED: ICD-10-CM

## 2021-02-09 DIAGNOSIS — E11.9 DIABETES (H): ICD-10-CM

## 2021-02-09 DIAGNOSIS — E11.9 DM2 (DIABETES MELLITUS, TYPE 2) (H): ICD-10-CM

## 2021-02-09 DIAGNOSIS — H61.21 IMPACTED CERUMEN OF RIGHT EAR: ICD-10-CM

## 2021-02-09 LAB
ALBUMIN SERPL-MCNC: 4.1 G/DL (ref 3.5–5)
ALP SERPL-CCNC: 63 U/L (ref 45–120)
ALT SERPL W P-5'-P-CCNC: 23 U/L (ref 0–45)
ANION GAP SERPL CALCULATED.3IONS-SCNC: 8 MMOL/L (ref 5–18)
AST SERPL W P-5'-P-CCNC: 21 U/L (ref 0–40)
BILIRUB SERPL-MCNC: 0.4 MG/DL (ref 0–1)
BUN SERPL-MCNC: 12 MG/DL (ref 8–22)
CALCIUM SERPL-MCNC: 8.6 MG/DL (ref 8.5–10.5)
CHLORIDE BLD-SCNC: 104 MMOL/L (ref 98–107)
CHOLEST SERPL-MCNC: 87 MG/DL
CO2 SERPL-SCNC: 30 MMOL/L (ref 22–31)
CREAT SERPL-MCNC: 0.68 MG/DL (ref 0.6–1.1)
CREAT UR-MCNC: 91.1 MG/DL
FASTING STATUS PATIENT QL REPORTED: YES
GFR SERPL CREATININE-BSD FRML MDRD: >60 ML/MIN/1.73M2
GLUCOSE BLD-MCNC: 103 MG/DL (ref 70–125)
HBA1C MFR BLD: 5.9 %
HDLC SERPL-MCNC: 29 MG/DL
LDLC SERPL CALC-MCNC: 31 MG/DL
MICROALBUMIN UR-MCNC: 1.11 MG/DL (ref 0–1.99)
MICROALBUMIN/CREAT UR: 12.2 MG/G
POTASSIUM BLD-SCNC: 4.2 MMOL/L (ref 3.5–5)
PROT SERPL-MCNC: 6.5 G/DL (ref 6–8)
SODIUM SERPL-SCNC: 142 MMOL/L (ref 136–145)
TRIGL SERPL-MCNC: 136 MG/DL

## 2021-02-09 RX ORDER — CICLOPIROX 80 MG/ML
SOLUTION TOPICAL
Qty: 6.6 ML | Refills: 0 | Status: SHIPPED | OUTPATIENT
Start: 2021-02-09 | End: 2022-07-05

## 2021-02-09 RX ORDER — CARVEDILOL 25 MG/1
TABLET, FILM COATED ORAL
Qty: 200 STRIP | Refills: 3 | Status: SHIPPED | OUTPATIENT
Start: 2021-02-09 | End: 2022-02-23

## 2021-02-09 RX ORDER — SIMVASTATIN 10 MG
10 TABLET ORAL AT BEDTIME
Qty: 90 TABLET | Refills: 3 | Status: SHIPPED | OUTPATIENT
Start: 2021-02-09 | End: 2022-04-06

## 2021-02-10 ENCOUNTER — AMBULATORY - HEALTHEAST (OUTPATIENT)
Dept: FAMILY MEDICINE | Facility: CLINIC | Age: 46
End: 2021-02-10

## 2021-02-10 DIAGNOSIS — N39.0 URINARY TRACT INFECTION WITHOUT HEMATURIA, SITE UNSPECIFIED: ICD-10-CM

## 2021-02-10 LAB — BACTERIA SPEC CULT: ABNORMAL

## 2021-03-18 ENCOUNTER — OFFICE VISIT - HEALTHEAST (OUTPATIENT)
Dept: FAMILY MEDICINE | Facility: CLINIC | Age: 46
End: 2021-03-18

## 2021-03-18 DIAGNOSIS — R35.0 FREQUENT URINATION: ICD-10-CM

## 2021-03-18 LAB
ALBUMIN UR-MCNC: NEGATIVE G/DL
APPEARANCE UR: CLEAR
BILIRUB UR QL STRIP: NEGATIVE
COLOR UR AUTO: YELLOW
GLUCOSE UR STRIP-MCNC: NEGATIVE MG/DL
HGB UR QL STRIP: NEGATIVE
KETONES UR STRIP-MCNC: NEGATIVE MG/DL
LEUKOCYTE ESTERASE UR QL STRIP: NEGATIVE
NITRATE UR QL: NEGATIVE
PH UR STRIP: 6.5 [PH] (ref 5–8)
SP GR UR STRIP: 1.01 (ref 1–1.03)
UROBILINOGEN UR STRIP-ACNC: NORMAL

## 2021-03-20 LAB — BACTERIA SPEC CULT: NO GROWTH

## 2021-03-22 ENCOUNTER — AMBULATORY - HEALTHEAST (OUTPATIENT)
Dept: NURSING | Facility: CLINIC | Age: 46
End: 2021-03-22

## 2021-04-12 ENCOUNTER — AMBULATORY - HEALTHEAST (OUTPATIENT)
Dept: NURSING | Facility: CLINIC | Age: 46
End: 2021-04-12

## 2021-05-27 NOTE — TELEPHONE ENCOUNTER
Medication Question or Clarification  Who is calling: Pharmacy: [facility]  What medication are you calling about?   generic lancets (MICROLET LANCET) 200 each 3 4/4/2019     Sig: TEST TWICE DAILY AS DIRECTED        Who prescribed the medication?: Saud Coley MD    What is your question/concern?: Fax received from pharmacy requesting new script for the above diabetic supplies.   The previous prescription has been voided due to the new Medicare updates.     Medicare guidelines have been updated as of 1/1/2019 and all new scripts must be submitted for diabetic supplies. Each script must list the ICD-10 code and specific directions for use. Also, it must state if the patient is insulin or non-insulin dependent.     *Please note: for non-insulin dependent patients, Medicare Part B will only cover for once daily testing AND for insulin dependent patients, Medicare Part B will only cover for 3 times daily testing*       Pharmacy: [facility]  Alexa to leave a detailed message?: No-speak to pharmacy staff  Site CMT - Please call the pharmacy to obtain any additional needed information.

## 2021-05-27 NOTE — PROGRESS NOTES
"On 19, Dr. Amador referred the patient to Clinic Care Coordination.    Order Diagnosis:  Down's Syndrome  Mild intellectual disabilities    Order Comments:  None    Dr. Amador consulted with me in-person about the patient.  She expressed concerns stating:    The patient cannot read or write.    The patient can count up to 100.    Patient's mom was her guardian; however, passed away a while ago.    Mom's friend, Zuri, took over cares for the patient.    The patient lives with Zuri.    Zuri manages the patient's finances.    The patient works at [employer].    Zuri is currently trying to get legal guardianship of the patient.    Dr. Amador questions if Zuri is the right \"fit\" for the patient.    Dr. Amador questions Zuri's intentions.    The patient was supposed to move into a group home in order to be around her peers; however, it did not happen and Dr. Amador doesn't know why.    The patient pays rent to Zuri to live in her home.    Dr. Amador documented in her note today:  \"Mom  and left Chioma under the care of mom's best friend Zuri.  Zuri is applying to be Chioma's foster mom.  I'd like to enlist clinic care coordination in evaluating and supporting her home situation.\"    From the information Dr. Amador has provided, it would be most appropriate for the patient to complete a SW Assessment if she agrees to enroll with Clinic Care Coordination.  At this time, the SW schedule is not open due to a change in SWs.  I will hold off reaching out to the patient at this time until the SW schedule opens.  I will keep an eye on the SW schedule in order to reach out as soon as possible.    Pending Appointments:  None  ________________________  Next Outreach Date: 19    "

## 2021-05-27 NOTE — PROGRESS NOTES
Assessment and Plan:     Routine general medical examination at a health care facility  We discussed healthy lifestyle, nutrition, cardiovascular risk reduction, self care, safety, sunscreen, seatbelt, and timing of cancer screening.  Health maintenance screening and immunizations reviewed with the patient.  Mom  and left Chioma under the care of mom's best friend Zuri.  Zuri is applying to be Chioma's foster mom.  I'd like to enlist clinic care coordination in evaluating and supporting her home situation.    DM (diabetes mellitus) (H)  -     Glycosylated Hemoglobin A1c=7.2  -     Microalbumin, Random Urine  Continue with metformin 2000 mg.  Dietary discretion advised   follow-up in 3 months    Essential hypertension  Continue with lisinopril 5 mg    Dyslipidemia  Continue with simvastatin 10 mg  -     Lipid Cascade  -     Comprehensive Metabolic Panel    BMI 30.0-30.9,adult  Counseled healthy lifestyle modifications  Exercise regimen:  60 seconds march in place followed by 20 jumping jacks x 3 sets     Encounter for screening mammogram for breast cancer  -     Mammo Screening Bilateral; Future    Cervical cancer screening  -     Gynecologic Cytology (PAP Smear)        The patient's current medical problems were reviewed.    I have had an Advance Directives discussion with the patient.  The following health maintenance schedule was reviewed with the patient and provided in printed form in the after visit summary:   Health Maintenance   Topic Date Due     DIABETES FOOT EXAM  2017     DIABETES OPHTHALMOLOGY EXAM  2017     DIABETES FOLLOW-UP  2018     INFLUENZA VACCINE RULE BASED (1) 2018     PAP SMEAR  10/31/2018     DIABETES HEMOGLOBIN A1C  10/17/2019     DIABETES URINE MICROALBUMIN  2020     ADVANCE DIRECTIVES DISCUSSED WITH PATIENT  2024     TD 18+ HE  2029     TDAP ADULT ONE TIME DOSE  Completed        Subjective:   Chief Complaint: Chioma Mccormick is an 44  y.o. female here for an Annual Wellness visit.     HPI: The patient is a 44-year-old Down syndrome with diabetic.  She is here for an annual physical.  She has diabetes and checks her blood sugar once a week.  Blood sugars are in the 110s.      Patient lives with her mom's best friend, Zuri.  Her mom passed away.  Zuri stated that she is going to file legal documents to become the patient's foster mother.  Zuri conducts the patient's of financial matters and assist with transportation to all her doctor appointments.  According to Zuri, the patient has an education of her first grader.  Chioma does not know how to read or write.    Review of Systems:  Please see above.  The rest of the review of systems are negative for all systems.    Patient Care Team:  Saud Coley MD as PCP - General (Family Medicine)     Patient Active Problem List   Diagnosis     Trisomy 21 (Down Syndrome)     DM (diabetes mellitus) (H)     Anxiety     Mild Intellectual Disabilities     Dyslipidemia     No past medical history on file.   No past surgical history on file.   Family History   Problem Relation Age of Onset     Cancer Mother      Diabetes Neg Hx      Heart disease Neg Hx      Breast cancer Neg Hx      Colon cancer Neg Hx       Social History     Socioeconomic History     Marital status: Single     Spouse name: Not on file     Number of children: Not on file     Years of education: Not on file     Highest education level: Not on file   Occupational History     Not on file   Social Needs     Financial resource strain: Not on file     Food insecurity:     Worry: Not on file     Inability: Not on file     Transportation needs:     Medical: Not on file     Non-medical: Not on file   Tobacco Use     Smoking status: Never Smoker     Smokeless tobacco: Never Used   Substance and Sexual Activity     Alcohol use: No     Drug use: No     Sexual activity: Not on file   Lifestyle     Physical activity:     Days per  "week: Not on file     Minutes per session: Not on file     Stress: Not on file   Relationships     Social connections:     Talks on phone: Not on file     Gets together: Not on file     Attends Adventism service: Not on file     Active member of club or organization: Not on file     Attends meetings of clubs or organizations: Not on file     Relationship status: Not on file     Intimate partner violence:     Fear of current or ex partner: Not on file     Emotionally abused: Not on file     Physically abused: Not on file     Forced sexual activity: Not on file   Other Topics Concern     Not on file   Social History Narrative     Not on file      Current Outpatient Medications   Medication Sig Dispense Refill     blood glucose test (CONTOUR TEST STRIPS) strips Use 1 each As Directed as needed. Dispense brand per patient's insurance at pharmacy discretion. 200 strip 3     calcium-vitamin D (CALCIUM-VITAMIN D) 500 mg(1,250mg) -200 unit per tablet TAKE 1 TABLET BY MOUTH 2 (TWO) TIMES A DAY WITH MEALS. 180 tablet 3     generic lancets (MICROLET LANCET) TEST ONCE DAILY AS DIRECTED 200 each 3     lisinopril (PRINIVIL,ZESTRIL) 5 MG tablet Take 1 tablet (5 mg total) by mouth daily. 90 tablet 3     metFORMIN (GLUCOPHAGE) 1000 MG tablet Take 1 tablet (1,000 mg total) by mouth 2 (two) times a day. 180 tablet 2     simvastatin (ZOCOR) 10 MG tablet TAKE 1 TABLET BY MOUTH NIGHTLY AT BEDTIME 90 tablet 2     No current facility-administered medications for this visit.       Objective:   Vital Signs:   Visit Vitals  /78   Pulse 68   Ht 4' 11\" (1.499 m)   Wt 153 lb 3 oz (69.5 kg)   BMI 30.94 kg/m         VisionScreening:  No exam data present     PHYSICAL EXAM    Objective:    Physical Exam   Vitals:    04/17/19 0905   BP: 130/78   Pulse: 68      Constitutional: Patient is oriented to person, place, and time. Patient appears well-developed and well-nourished. No distress.   Head: Normocephalic and atraumatic.   Right Ear: " External ear normal. Normal TM  Left Ear: External ear normal. Normal TM  Nose: Nose normal.   Mouth/Throat: Oropharynx is clear and moist. No oropharyngeal exudate.   Eyes: Conjunctivae and EOM are normal. Pupils are equal, round, and reactive to light. Right eye exhibits no discharge. Left eye exhibits no discharge. No scleral icterus.   Neck: Neck supple. No JVD present. No tracheal deviation present. No thyromegaly present.   Cardiovascular: Normal rate, regular rhythm, normal heart sounds and intact distal pulses. No murmur heard.   Pulmonary/Chest: Effort normal and breath sounds normal. No stridor. No respiratory distress. Patient has no wheezes, no rales, exhibits no tenderness.   Abdominal: Soft. Bowel sounds are normal. Patient exhibits no distension and no mass. There is no tenderness. There is no rebound and no guarding.   Lymphadenopathy:  Patient has no cervical adenopathy.   Neurological: Patient is alert and oriented to person, place, and time. Patient has normal reflexes. No cranial nerve deficit. Coordination normal.   Skin: Skin is warm and dry. No rash noted. Patient is not diaphoretic. No erythema. No pallor.   Normal breast exam  Normal pelvic exam    Assessment Results 4/17/2019   Activities of Daily Living No help needed   Instrumental Activities of Daily Living 5-6 - Severe functional impairment   Get Up and Go Score Less than 12 seconds   Mini Cog Total Score 2   Some recent data might be hidden     A Mini-Cog score of 0-2 suggests the possibility of dementia, score of 3-5 suggests no dementia    Identified Health Risks:     The patient was provided with suggestions to help her develop a healthy physical lifestyle.   She is at risk for lack of exercise and has been provided with information to increase physical activity for the benefit of her well-being.

## 2021-05-27 NOTE — TELEPHONE ENCOUNTER
Refill Approved    Rx renewed per Medication Renewal Policy. Medication was last renewed on 1/16/19.    Jazmyn King, Care Connection Triage/Med Refill 4/4/2019     Requested Prescriptions   Pending Prescriptions Disp Refills     blood glucose test (CONTOUR TEST STRIPS) strips 200 strip 3     Sig: Use 1 each As Directed as needed. Dispense brand per patient's insurance at pharmacy discretion.    Diabetic Supplies Refill Protocol Passed - 4/3/2019  4:16 PM       Passed - Visit with PCP or prescribing provider visit in last 6 months    Last office visit with prescriber/PCP: 1/16/2019 Saud Coley MD OR same dept: 1/16/2019 Saud Coley MD OR same specialty: 1/16/2019 Saud Coley MD  Last physical: 3/1/2017 Last MTM visit: Visit date not found   Next visit within 3 mo: Visit date not found  Next physical within 3 mo: Visit date not found  Prescriber OR PCP: Saud Jama MD  Last diagnosis associated with med order: 1. DM2 (diabetes mellitus, type 2) (H)  - blood glucose test (CONTOUR TEST STRIPS) strips; Use 1 each As Directed as needed. Dispense brand per patient's insurance at pharmacy discretion.  Dispense: 200 strip; Refill: 3    2. DM (diabetes mellitus) (H)  - generic lancets (MICROLET LANCET); TEST TWICE DAILY AS DIRECTED  Dispense: 200 each; Refill: 3    If protocol passes may refill for 12 months if within 3 months of last provider visit (or a total of 15 months).            Passed - A1C in last 6 months    Hemoglobin A1c   Date Value Ref Range Status   01/16/2019 6.8 (H) 3.5 - 6.1 % Final               generic lancets (MICROLET LANCET) 200 each 3     Sig: TEST TWICE DAILY AS DIRECTED    Diabetic Supplies Refill Protocol Passed - 4/3/2019  4:16 PM       Passed - Visit with PCP or prescribing provider visit in last 6 months    Last office visit with prescriber/PCP: 1/16/2019 Saud Coley MD OR same dept: 1/16/2019 Marjorie  Saud Jama MD OR same specialty: 1/16/2019 Saud Coley MD  Last physical: 3/1/2017 Last MTM visit: Visit date not found   Next visit within 3 mo: Visit date not found  Next physical within 3 mo: Visit date not found  Prescriber OR PCP: Saud Jama MD  Last diagnosis associated with med order: 1. DM2 (diabetes mellitus, type 2) (H)  - blood glucose test (CONTOUR TEST STRIPS) strips; Use 1 each As Directed as needed. Dispense brand per patient's insurance at pharmacy discretion.  Dispense: 200 strip; Refill: 3    2. DM (diabetes mellitus) (H)  - generic lancets (MICROLET LANCET); TEST TWICE DAILY AS DIRECTED  Dispense: 200 each; Refill: 3    If protocol passes may refill for 12 months if within 3 months of last provider visit (or a total of 15 months).            Passed - A1C in last 6 months    Hemoglobin A1c   Date Value Ref Range Status   01/16/2019 6.8 (H) 3.5 - 6.1 % Final

## 2021-05-28 ENCOUNTER — RECORDS - HEALTHEAST (OUTPATIENT)
Dept: ADMINISTRATIVE | Facility: CLINIC | Age: 46
End: 2021-05-28

## 2021-05-28 NOTE — PROGRESS NOTES
The Clinic Care Guide called the patient  today at the request of the PCP to discuss possible clinic care coordination enrollment. Clinic care coordination was described to the patient and immediate needs were discussed. The patient agreed to enrollment in clinic care coordination and future appointments were scheduled for an RN care coordination assessment. The patient was provided with contact information for the clinic care guide.    Priority: Routine Class: Internal Referral    Standing Status: Normal Status: Sent [2]    Ordering User: Saud Clay MD Department: [facility]/ob    Auth Provider: SAUD CLAY Enc Provider: Saud Clay MD    Diagnosis: Down's syndrome  Mild intellectual disabilities      Indications of Use:         Expected Date:        Order Questions:          Order Comments:        Sched Instructions:   Patient Instructions:       Visit Types: CCC RN ASSESSMENT      Sched Instruct (Non-Radiology):         Referral Priority: Routine [1]      Additional Comments:        Order Summary Internal Referral, Routine, Primary Care, Continuity of Care

## 2021-05-28 NOTE — TELEPHONE ENCOUNTER
Zuri(caregiver) notified. Consent to communicate on file.  Maisha Orona, [facility] CMT

## 2021-05-28 NOTE — PROGRESS NOTES
Care Guide reviewed the patient's chart.  Patient is scheduled to enroll with Clinic Care Coordination.  The SW Assessment with JITENDRA Armendariz, is scheduled for 6/6/19 at 9:00.    Care Guide will plan to meet with the patient at the end of the SW Assessment in order to coordinate a date and time to complete the Care Plan.    Pending Appointments:  5/30/19 at 10:45 for Mammogram @  Mammo  6/6/19 at 9:00 with JITENDRA Nguyen                 9:00 with Kristel Andres [facility] Care Guide (will see patient at the end of the appt with Marcello)  ________________________  Next Outreach Date: 6/6/19

## 2021-05-28 NOTE — TELEPHONE ENCOUNTER
----- Message from Saud Jama MD sent at 5/16/2019  1:31 PM CDT -----  Please inform the patient that she has a urinary tract infection.  I sent her prescription to the pharmacy.

## 2021-05-28 NOTE — PROGRESS NOTES
ASSESSMENT/PLAN:  Increased frequency of urination, urinary incontinence  This is a 44-year-old diabetic with Down syndrome presented with her personal care attendant for urinary incontinence and urinary frequency.  It is difficult to determine the etiology.  Differential diagnoses could be anywhere from diabetes, recurrent urinary tract infection, pelvic floor dysfunction, stress or urge urinary incontinence, or neurogenic bladder.  We could not get an adequate sample for urinalysis.  I provided her with a couple cups to bring back a sample.  I think it would be worthwhile to see urology for further evaluation and management.  -     Urinalysis-UC if Indicated; Future    SUBJECTIVE:    Chioma Mccormick is a 44 y.o. female who came in today with her personal care attendant for concerns regarding urinary incontinence and urinary frequency.  She has diabetes type 2 on metformin, lisinopril, simvastatin.  She also has Down syndrome and intellectual disability, whereby she has difficulty understanding her bowel and bladder function.  She has been noticed to wear her same underwear after taking a shower.  She has had frequent urinary tract infection.  Patient provides very little history insight per her personal care attendant provided of the detail.  She stated that the chest, may have had urinary incontinence for many years now.  She has had accidents and wet bedsheets.  Chioma is well-known to me and has had frequent urinary tract infections as well.    Review of Systems (except those mentioned above)  Constitutional: Negative.   HENT: Negative.   Eyes: Negative.   Respiratory: Negative.   Cardiovascular: Negative.   Gastrointestinal: Negative.   Endocrine: Negative.   Genitourinary: Negative.   Musculoskeletal: Negative.   Skin: Negative.   Allergic/Immunologic: Negative.   Neurological: Negative.   Hematological: Negative.   Psychiatric/Behavioral: Negative.     Patient Active Problem List    Diagnosis Date Noted      Dyslipidemia 03/24/2016     Trisomy 21 (Down Syndrome)      DM (diabetes mellitus) (H)      Anxiety      Mild Intellectual Disabilities      No Known Allergies  Current Outpatient Medications   Medication Sig Dispense Refill     blood glucose test (CONTOUR TEST STRIPS) strips Use 1 each As Directed as needed. Dispense brand per patient's insurance at pharmacy discretion. 200 strip 3     calcium-vitamin D (CALCIUM-VITAMIN D) 500 mg(1,250mg) -200 unit per tablet TAKE 1 TABLET BY MOUTH 2 (TWO) TIMES A DAY WITH MEALS. 180 tablet 3     generic lancets (MICROLET LANCET) TEST ONCE DAILY AS DIRECTED 200 each 3     lisinopril (PRINIVIL,ZESTRIL) 5 MG tablet Take 1 tablet (5 mg total) by mouth daily. 90 tablet 3     metFORMIN (GLUCOPHAGE) 1000 MG tablet Take 1 tablet (1,000 mg total) by mouth 2 (two) times a day. 180 tablet 2     simvastatin (ZOCOR) 10 MG tablet TAKE 1 TABLET BY MOUTH NIGHTLY AT BEDTIME 90 tablet 2     No current facility-administered medications for this visit.      No past medical history on file.  No past surgical history on file.  Social History     Socioeconomic History     Marital status: Single     Spouse name: None     Number of children: None     Years of education: None     Highest education level: None   Occupational History     None   Social Needs     Financial resource strain: None     Food insecurity:     Worry: None     Inability: None     Transportation needs:     Medical: None     Non-medical: None   Tobacco Use     Smoking status: Never Smoker     Smokeless tobacco: Never Used   Substance and Sexual Activity     Alcohol use: No     Drug use: No     Sexual activity: None   Lifestyle     Physical activity:     Days per week: None     Minutes per session: None     Stress: None   Relationships     Social connections:     Talks on phone: None     Gets together: None     Attends [employer] service: None     Active member of club or organization: None     Attends meetings of clubs or  organizations: None     Relationship status: None     Intimate partner violence:     Fear of current or ex partner: None     Emotionally abused: None     Physically abused: None     Forced sexual activity: None   Other Topics Concern     None   Social History Narrative     None     Family History   Problem Relation Age of Onset     Cancer Mother      Diabetes Neg Hx      Heart disease Neg Hx      Breast cancer Neg Hx      Colon cancer Neg Hx          OBJECTIVE:    Vitals:    05/13/19 1527   BP: 122/80   Pulse: 72   SpO2: 98%   Weight: 152 lb (68.9 kg)     Body mass index is 30.7 kg/m .    Physical Exam:  Constitutional: Patient is awake, alert, appears well-developed and well-nourished. No distress.   Head: Normocephalic and atraumatic.   Right Ear: External ear normal.   Left Ear: External ear normal.   Nose: Nose normal.   Eyes: Conjunctivae and EOM are normal. Right eye exhibits no discharge. Left eye exhibits no discharge. No scleral icterus.   Skin: No rash noted. Patient is not diaphoretic. No erythema. No pallor.     A total of 25 minutes visit with over 50% of the time spent on counseling the patient about how to collect her urine for urinalysis, differential diagnoses, and treatment plan.

## 2021-05-29 NOTE — PROGRESS NOTES
CCC FARRUKH received call back from patient's DD Waiver . FARRUKH informed Carmen that Chioma was seen last week for a Social Work assessment. Reviewed some of patients history with Carmen and current PCA. Carmen reported there have been times she needs to clarify boundaries with PCA, Zuri, and that Chioma is an adult and can make decisions.     Carmen reported that last year Chioma was setup to move to a different living situation. Carmen was encouraging independent living and Chioma wanted a group home, so Carmen was helping her. Carmen reported there was a sudden change in plans and Chioma reported wanting to continue to live with Zuri and they would continue living together.     Carmen reported she checks in with Chioma regularly and hears from Zuri often. They are required to meet face to face twice yearly and had their annual review in January or February. There have been no concerns reported to Carmen this year.     Chioma's sister was previously in trouble for fraud with the DD waiver funds. This has previously cause a lot of tension between Chioma, Lauren, and Zuri.     Discussed Chioma can be easily influenced and also at times attention seeking. However, it is still important to hear from her individually. She also is reported to have expectations of Zuri as well that can cause tension.    Carmen said if there are any concerns the CCC team can contact her and we can also encourage Chioma to contact her as well.    Carmen was added to care team and ARIELLE's are signed.

## 2021-05-29 NOTE — PROGRESS NOTES
Community Health Worker met with the patient during the RN Assessment in order to help coordinate a date and time to complete the Care Plan.    CCC Care Plan appointment is scheduled for 6/11/19.    Care Guide also had patient sign ARIELLE's for Carmen .  Care Guide will call Carmen for additional information.
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
## 2021-05-29 NOTE — PROGRESS NOTES
ASSESSMENT/PLAN:  Urinary tract infection, treated  44-year-old female Down syndrome with diabetes presented with recurrent urinary tract infection.  She completed her antibiotic.  She has no symptoms today.  She is currently on her menstrual cycle and does not want to leave a urine sample.  She will come back for a diabetic visit during which time I recommend checking urine culture for test of cure    Care management  Patient is enrolled and [facility] care management.  She is here today for enrollment    SUBJECTIVE:    Chioma Mccormick is a 44 y.o. female who came in today for follow-up.  She was treated recently for urinary tract infection.  She has recurrent urinary tract infection.  She completed the antibiotic.  She denies dysuria, hematuria, urinary frequency, urinary urgency.    Review of Systems (except those mentioned above)  Constitutional: Negative.   HENT: Negative.   Eyes: Negative.   Respiratory: Negative.   Cardiovascular: Negative.   Gastrointestinal: Negative.   Endocrine: Negative.   Genitourinary: Negative.   Musculoskeletal: Negative.   Skin: Negative.   Allergic/Immunologic: Negative.   Neurological: Negative.   Hematological: Negative.   Psychiatric/Behavioral: Negative.     Patient Active Problem List    Diagnosis Date Noted     Dyslipidemia 03/24/2016     Trisomy 21 (Down Syndrome)      DM (diabetes mellitus) (H)      Anxiety      Mild Intellectual Disabilities      No Known Allergies  Current Outpatient Medications   Medication Sig Dispense Refill     blood glucose test (CONTOUR TEST STRIPS) strips Use 1 each As Directed once daily. Dispense brand per patient's insurance at pharmacy discretion. 200 strip 3     calcium-vitamin D (CALCIUM-VITAMIN D) 500 mg(1,250mg) -200 unit per tablet TAKE 1 TABLET BY MOUTH 2 (TWO) TIMES A DAY WITH MEALS. 180 tablet 3     generic lancets (MICROLET LANCET) TEST ONCE DAILY AS DIRECTED 200 each 3     lisinopril (PRINIVIL,ZESTRIL) 5 MG tablet Take 1 tablet (5  mg total) by mouth daily. 90 tablet 3     metFORMIN (GLUCOPHAGE) 1000 MG tablet Take 1 tablet (1,000 mg total) by mouth 2 (two) times a day. 180 tablet 2     simvastatin (ZOCOR) 10 MG tablet TAKE 1 TABLET BY MOUTH NIGHTLY AT BEDTIME 90 tablet 2     No current facility-administered medications for this visit.      No past medical history on file.  No past surgical history on file.  Social History     Socioeconomic History     Marital status: Single     Spouse name: None     Number of children: None     Years of education: None     Highest education level: None   Occupational History     None   Social Needs     Financial resource strain: None     Food insecurity:     Worry: None     Inability: None     Transportation needs:     Medical: None     Non-medical: None   Tobacco Use     Smoking status: Never Smoker     Smokeless tobacco: Never Used   Substance and Sexual Activity     Alcohol use: No     Drug use: No     Sexual activity: None   Lifestyle     Physical activity:     Days per week: None     Minutes per session: None     Stress: None   Relationships     Social connections:     Talks on phone: None     Gets together: None     Attends Bahai service: None     Active member of club or organization: None     Attends meetings of clubs or organizations: None     Relationship status: None     Intimate partner violence:     Fear of current or ex partner: None     Emotionally abused: None     Physically abused: None     Forced sexual activity: None   Other Topics Concern     None   Social History Narrative     None     Family History   Problem Relation Age of Onset     Cancer Mother      Diabetes Neg Hx      Heart disease Neg Hx      Breast cancer Neg Hx      Colon cancer Neg Hx          OBJECTIVE:    Vitals:    06/11/19 1533   BP: 124/74   Patient Site: Left Arm   Patient Position: Sitting   Cuff Size: Adult Regular   Pulse: 72   Weight: 149 lb (67.6 kg)     Body mass index is 30.09 kg/m .    Physical  Exam:  Constitutional: Patient is oriented to person, place, and time. Patient appears well-developed and well-nourished. No distress.   Head: Normocephalic and atraumatic.   Right Ear: External ear normal.   Left Ear: External ear normal.   Nose: Nose normal.   Eyes: Conjunctivae and EOM are normal. Right eye exhibits no discharge. Left eye exhibits no discharge. No scleral icterus.   Skin: No rash noted. Patient is not diaphoretic. No erythema. No pallor.      Results for orders placed or performed in visit on 05/14/19   Culture, Urine   Result Value Ref Range    Culture >100,000 col/ml Escherichia coli (!)        Susceptibility    Escherichia coli - ALONZO     Amikacin <=8 Sensitive      Amoxicillin + Clavulanate 8/4 Sensitive      Ampicillin >16 Resistant      Cefazolin 4 Sensitive      Cefepime <=1 Sensitive      Ceftriaxone <=1 Sensitive      Ciprofloxacin >2 Resistant      Gentamicin >8 Resistant      Imipenem <=0.25 Sensitive      Levofloxacin >4 Resistant      Meropenem <=0.25 Sensitive      Nitrofurantoin <=16 Sensitive      Tetracycline <=2 Sensitive      Tobramycin 8 Intermediate      Trimethoprim + Sulfamethoxazole <=0.5 Sensitive    Urinalysis-UC if Indicated   Result Value Ref Range    Color, UA Yellow Colorless, Yellow, Straw, Light Yellow    Clarity, UA Slightly Cloudy (!) Clear    Glucose, UA Negative Negative    Bilirubin, UA Negative Negative    Ketones, UA Negative Negative    Specific Gravity, UA 1.015 1.005 - 1.030    Blood, UA Negative Negative    pH, UA 7.0 5.0 - 8.0    Protein, UA Negative Negative mg/dL    Urobilinogen, UA 0.2 E.U./dL 0.2 E.U./dL, 1.0 E.U./dL    Nitrite, UA Positive (!) Negative    Leukocytes, UA Trace (!) Negative    Bacteria, UA Many (!) None Seen hpf    RBC, UA 0-2 None Seen, 0-2 hpf    WBC, UA 0-5 None Seen, 0-5 hpf    Squam Epithel, UA 0-5 None Seen, 0-5 lpf

## 2021-05-29 NOTE — PROGRESS NOTES
Disciplines Present:  Marcello Rajan,FARRUKH Muñoz, DOMINICK [place], SANDYW  Tamy Miller, MELBA     The patient was discussed during weekly Care Conference Huddle today.   Goals        Patient Stated      I would like to have a bicycle by the end of summer (pt-stated)      Action steps to achieve this goal  1.  I will search for a bicycle I want to purchase and ask for assistance in purchasing and picking up the bicycle  2.  I will practice riding my new bicycle and work toward riding it to work     Date goal set:  6/6/2019 BC        SW: I would like to know more about what services may be available with my DD waiver within 1-3 months (pt-stated)      Action steps to achieve this goal  1.  [facility] will consult with Carmen , to gain understanding of current services  2.  [facility] will explore other services that may be available to utilize with DD waiver      Date goal set:  6/6/2019 [facility] CC met recently met with patient and guardian for initial meeting and set goals   CC discussed concern with level of participation from patient during meeting and would like to get additional insight into patient desire for additional independence and resource options  Patient is scheduled to meet with PCP today for follow up visit as well as with Community Health Worker      Barriers: Pt high functioning yet guardian take lead on most medical and ADL decistion    Action Plan if indicated:   Gain additional insight about patient desire for more independence and abilities for ADL     Review and assess options for supportive services     Plan/Follow up needed:[facility] CC will follow up with [facility] and PCP to get insight into relationship and function of patient   [facility] will attempt to meet with patient independent from guardian for additional insight to developing personal goals   [facility] will update [facility] team if additional support needed to evaluate and create goals with patient

## 2021-05-29 NOTE — PROGRESS NOTES
Scheduled Follow Up: Attempt 1  Care Guide sent a [employer] message for the patient to follow up on goals and action steps.  If the patient does call to speak with me, please transfer her to me, Jerrod, at [phone].    ____________________________  Next Outreach: 7/9/19  Planned Outreach Frequency: bi-weekly  Preferred Phone Number: Zuri [phone]. (Prefers all outreach/communications through [employer])    SW Assessment Date: 6/6/19  Care Plan Completion Date: 6/11/19

## 2021-05-29 NOTE — TELEPHONE ENCOUNTER
Medication Question or Clarification  Who is calling: Pharmacy: [facility]  What medication are you calling about? (include dose and sig)   blood glucose test (CONTOUR TEST STRIPS) strips 200 strip 3 4/4/2019     Sig - Route: Use 1 each As Directed as needed. Dispense brand per patient's insurance at pharmacy discretion. - Miscellaneous    Sent to pharmacy as: blood glucose test (CONTOUR TEST STRIPS) strips    Notes to Pharmacy: Test twice daily        Who prescribed the medication?: Dr Amador  What is your question/concern?: Fax request received from pharmacy states: Patient's medicare part B insurance will only allow for once daily testing (non insulin dependent). Please update directions to once daily not twice daily.  Pharmacy: [facility]  Alexa to leave a detailed message?: Yes  Site CMT - Please call the pharmacy to obtain any additional needed information.

## 2021-05-29 NOTE — TELEPHONE ENCOUNTER
Who is calling:  Chioma at [facility]  Reason for Call:  She needs a prescription for test strips with sig of test once a day.  She needs this today please.  Date of last appointment with primary care: 5/13/19  Okay to leave a detailed message: No

## 2021-05-29 NOTE — PROGRESS NOTES
Care Guide met with the patient and her care giver, Zuri, in clinic today, to complete care plan and enrollment in Clinic Care Coordination.    Discussed the following:  I would like to have a bicycle by the end of summer    Zuri informed me that they do plan to have a bicycle for Chioma by August.    The plan is for her to get a bicycle, so that she's able to get to and from work.    However, Chioma is afraid of the busy roads and walking across the street.    Chioma would like for Zuri to assist her when she does get a bike, by walking with her to work.    Patient reports:  Chioma is going to prom on 6/22/19 and is really excited for it.  Proud that she's been trying to start a healthier lifestyle by eating regularly three times a day.    Care Guide provided the patient an Advanced Care Directive and talked over it with Zuri.  Zuri will explain what this is in more detail.  I did let her know if this is something they want to think about, and make it official they'll need to get it notarized.  Zuri acknowledged understanding.    I also let them know in the Care Plan folder was a consent to communicate form and a DHS form for them to fill out and bring back to us.  I was not able to get them to fill out and sign during the appointment as they were already getting up to leave.    Patient went home with the following.  1. Care Plan packet which included (Advanced care directive, consent to communicate, DHS form, walk-in care brochure, mychart brochure)    ____________________________  Next Outreach: 6/24/19  Planned Outreach Frequency: bi-weekly  Preferred Phone Number: Zuri [phone]. (Prefers all outreach/communications through [employer])    SW Assessment Date: 6/6/19  Care Plan Completion Date: 6/11/19

## 2021-05-29 NOTE — PROGRESS NOTES
My Clinic Care Coordination Care Plan    [facility]  [address] [place]  [address]  [phone]    My Preferred Method of Contact:  Juangissellet    My Primary/Preferred Language:  English    Preferred Learning Style:  Reading information, Face to face discussion, Pictures/Diagrams and Hands on teaching    Emergency Contact: Extended Emergency Contact Information  Primary Emergency Contact: CarmenZuri   [facility]  Home Phone: [phone]  Mobile Phone: [phone]  Relation: Other  Secondary Emergency Contact: NONE, PER PT   [place]  Relation: Declined     PCP:  Saud Coley MD  Specialists:    Care Team            Saud Coley MD   [phone] PCP - General, Family Medicine    Janelle Andres, [facility] Care Coordination Care Guide, Primary Care - [facility]: Ph. [phone] ; Fax. [phone]    Dave Rajan, FARRUKH , Primary Care -     Carmen Back   [phone]     MARYANA Prado     Zuri Custar Patient Care Assistant    Rafael Castro, DPM   [phone] Physician, Podiatry    Alber Parikh DDS   [phone] Dentist    Alber Parikh [facility] Dent[facility]    [facility]   [phone]     Karmen Thomas LPN Licensed Practical Nurse    Podiatry            Hospitalizations and/or ED Visits  None    Concerns regarding my health: Making sure that I'm staying on a healthy lifestyle.    Advanced Directive/Living Will: The patient was given information regarding Adanced Directives/Living Will      Chioma elected to enroll in the [facility] Care Coordination.  Chioma was given a copy of the Clinic Care Coordination brochure and full description of how to access their care team both during clinic hours and after hours.   My Care Guide's Name and Phone Number:  Jerrod Osullivan [facility] Care Guide - [phone]  The Care Guide and myself agreed  to be in contact every 2 weeks.      Medication Update  The patient's medication list is up to date per today's appointment with Dr Amador.    Health Maintenance and Immunization Update  The patient's preventive health screenings and immunizations are up to date per PCP.    My Emergency Plan    All [facility] clinic patients have access to a Nurse 24 hours a day, 7 days a week.  If you have questions or want advice from a Nurse, please know [facility] is here for you.  You can call your clinic and they will connect you or you can call [facility] at [phone].  [facility] also has Walk In Care clinics in multiple locations.  Call the number listed above for more information about our Walk In Care clinics or visit the [facility] website at [email or web address].    Patient Support  I will ask my emergency contact for help in supporting me in these goals.  Relationship to patient: [facility] # : [phone] , best time to call anytime.

## 2021-05-30 VITALS — WEIGHT: 140.19 LBS | BODY MASS INDEX: 27.38 KG/M2

## 2021-05-30 VITALS — WEIGHT: 142.19 LBS | HEIGHT: 59 IN | BODY MASS INDEX: 28.67 KG/M2

## 2021-05-30 VITALS — WEIGHT: 144.1 LBS | BODY MASS INDEX: 28.14 KG/M2

## 2021-05-30 VITALS — BODY MASS INDEX: 28.52 KG/M2 | WEIGHT: 141.19 LBS

## 2021-05-30 NOTE — PROGRESS NOTES
Scheduled Follow Up Call: Attempt 2  Community Health Worker sent a [employer] message for the patient to follow up on goals and action steps.  If the patient is returning my call, please transfer her to me, Jerrod at [phone].    ____________________________  Next Outreach: 7/22/19  Planned Outreach Frequency: bi-weekly  Preferred Phone Number: Zuri [phone]. (Prefers all outreach/communications through [employer])    SW Assessment Date: 6/6/19  Care Plan Completion Date: 6/11/19
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
## 2021-05-30 NOTE — PROGRESS NOTES
ASSESSMENT/PLAN:  Recurrent UTI  -     Culture, Urine  44-year-old female Down syndrome with diabetes presented with recurrent urinary tract infection follow up.  She completed her antibiotic.  She has no symptoms today.  urine culture for test of cure    SUBJECTIVE:    Chioma Mccormick is a 44 y.o. female who came in today for follow-up.  She was treated recently for urinary tract infection.  She has recurrent urinary tract infection.  She completed the antibiotic.  She denies dysuria, hematuria, urinary frequency, urinary urgency.    Review of Systems (except those mentioned above)  Constitutional: Negative.   HENT: Negative.   Eyes: Negative.   Respiratory: Negative.   Cardiovascular: Negative.   Gastrointestinal: Negative.   Endocrine: Negative.   Genitourinary: Negative.   Musculoskeletal: Negative.   Skin: Negative.   Allergic/Immunologic: Negative.   Neurological: Negative.   Hematological: Negative.   Psychiatric/Behavioral: Negative.     Patient Active Problem List    Diagnosis Date Noted     Dyslipidemia 03/24/2016     Trisomy 21 (Down Syndrome)      DM (diabetes mellitus) (H)      Anxiety      Mild Intellectual Disabilities      No Known Allergies  Current Outpatient Medications   Medication Sig Dispense Refill     blood glucose test (CONTOUR TEST STRIPS) strips Use 1 each As Directed once daily. Dispense brand per patient's insurance at pharmacy discretion. 200 strip 3     calcium-vitamin D (CALCIUM-VITAMIN D) 500 mg(1,250mg) -200 unit per tablet TAKE 1 TABLET BY MOUTH 2 (TWO) TIMES A DAY WITH MEALS. 180 tablet 3     generic lancets (MICROLET LANCET) TEST ONCE DAILY AS DIRECTED 200 each 3     lisinopril (PRINIVIL,ZESTRIL) 5 MG tablet Take 1 tablet (5 mg total) by mouth daily. 90 tablet 3     metFORMIN (GLUCOPHAGE) 1000 MG tablet Take 1 tablet (1,000 mg total) by mouth 2 (two) times a day. 180 tablet 2     simvastatin (ZOCOR) 10 MG tablet TAKE 1 TABLET BY MOUTH NIGHTLY AT BEDTIME 90 tablet 2     No  current facility-administered medications for this visit.      No past medical history on file.  No past surgical history on file.  Social History     Socioeconomic History     Marital status: Single     Spouse name: None     Number of children: None     Years of education: None     Highest education level: None   Occupational History     None   Social Needs     Financial resource strain: None     Food insecurity:     Worry: None     Inability: None     Transportation needs:     Medical: None     Non-medical: None   Tobacco Use     Smoking status: Never Smoker     Smokeless tobacco: Never Used   Substance and Sexual Activity     Alcohol use: No     Drug use: No     Sexual activity: None   Lifestyle     Physical activity:     Days per week: None     Minutes per session: None     Stress: None   Relationships     Social connections:     Talks on phone: None     Gets together: None     Attends Holiness service: None     Active member of club or organization: None     Attends meetings of clubs or organizations: None     Relationship status: None     Intimate partner violence:     Fear of current or ex partner: None     Emotionally abused: None     Physically abused: None     Forced sexual activity: None   Other Topics Concern     None   Social History Narrative     None     Family History   Problem Relation Age of Onset     Cancer Mother      Diabetes Neg Hx      Heart disease Neg Hx      Breast cancer Neg Hx      Colon cancer Neg Hx          OBJECTIVE:    Vitals:    06/26/19 0813   BP: 120/78   Patient Site: Right Arm   Patient Position: Sitting   Cuff Size: Adult Regular   Pulse: 66   SpO2: 97%   Weight: 147 lb (66.7 kg)     Body mass index is 29.69 kg/m .    Physical Exam:  Constitutional: Patient is oriented to person, place, and time. Patient appears well-developed and well-nourished. No distress.   Head: Normocephalic and atraumatic.   Right Ear: External ear normal.   Left Ear: External ear normal.   Nose: Nose  normal.   Eyes: Conjunctivae and EOM are normal. Right eye exhibits no discharge. Left eye exhibits no discharge. No scleral icterus.   Skin: No rash noted. Patient is not diaphoretic. No erythema. No pallor.       Results for orders placed or performed in visit on 05/14/19   Culture, Urine   Result Value Ref Range    Culture >100,000 col/ml Escherichia coli (!)        Susceptibility    Escherichia coli - ALONZO     Amikacin <=8 Sensitive      Amoxicillin + Clavulanate 8/4 Sensitive      Ampicillin >16 Resistant      Cefazolin 4 Sensitive      Cefepime <=1 Sensitive      Ceftriaxone <=1 Sensitive      Ciprofloxacin >2 Resistant      Gentamicin >8 Resistant      Imipenem <=0.25 Sensitive      Levofloxacin >4 Resistant      Meropenem <=0.25 Sensitive      Nitrofurantoin <=16 Sensitive      Tetracycline <=2 Sensitive      Tobramycin 8 Intermediate      Trimethoprim + Sulfamethoxazole <=0.5 Sensitive    Urinalysis-UC if Indicated   Result Value Ref Range    Color, UA Yellow Colorless, Yellow, Straw, Light Yellow    Clarity, UA Slightly Cloudy (!) Clear    Glucose, UA Negative Negative    Bilirubin, UA Negative Negative    Ketones, UA Negative Negative    Specific Gravity, UA 1.015 1.005 - 1.030    Blood, UA Negative Negative    pH, UA 7.0 5.0 - 8.0    Protein, UA Negative Negative mg/dL    Urobilinogen, UA 0.2 E.U./dL 0.2 E.U./dL, 1.0 E.U./dL    Nitrite, UA Positive (!) Negative    Leukocytes, UA Trace (!) Negative    Bacteria, UA Many (!) None Seen hpf    RBC, UA 0-2 None Seen, 0-2 hpf    WBC, UA 0-5 None Seen, 0-5 hpf    Squam Epithel, UA 0-5 None Seen, 0-5 lpf
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
## 2021-05-31 VITALS — WEIGHT: 152.56 LBS | BODY MASS INDEX: 30.81 KG/M2

## 2021-05-31 VITALS — WEIGHT: 149.38 LBS | BODY MASS INDEX: 30.17 KG/M2

## 2021-05-31 VITALS — BODY MASS INDEX: 31 KG/M2 | WEIGHT: 153.5 LBS

## 2021-05-31 NOTE — PROGRESS NOTES
My Clinic Care Coordination Wellness Plan  This Maintenance Wellness Plan provides private information in regard to the work I have done with my Care Team at my Primary Care Clinic.  This document provides insight on the goals I have worked hard to achieve.  My Care Team congratulates me on my journey to become well.  With the assistance of the Clinic Care Coordination Team and my Primary Care Provider, I have succeeded in improving areas of my health that I identified as barriers to becoming well.  I will continue to seek wellness and use the skills I have obtained to further my journey.  My Community Health Worker will follow up with me in 2 months.  In the meantime, if I should have any questions or concerns I will contact my Community Health Worker.    [address]  [address]  [phone]    My Preferred Method of Contact:  Kenn    My Primary/Preferred Language:  English    Preferred Learning Style:  Reading information, Face to face discussion, Pictures/Diagrams and Hands on teaching    Emergency Contact: Extended Emergency Contact Information  Primary Emergency Contact: Zuri Morales   [facility]  Home Phone: [phone]  Mobile Phone: [phone]  Relation: Other  Secondary Emergency Contact: NONE, PER PT   [place]  Relation: Declined     PCP:  Saud Coley MD  Specialists:    Care Team            Saud Coley MD   [phone] PCP - General, Family Medicine    Carmen Back   [phone]     MARYANA Prado     [facility] Patient Care Assistant    Rafael Castro, DPTRISTIN   [phone] Physician, Podiatry    Alber Parikh DDS   [phone] Dentist    Alber Parikh [facility]    [facility]   [phone]     Karmen Thomas LPN Licensed Practical Nurse    Podiatry    Bernarda Rodriguez, DOMINICK   [phone] Community Health Worker, Primary Care -      Lizzeth Cortes RN Primary Care - CC        Accomplishments:    Goals        Patient Stated      COMPLETED: I have accomplished looking into ongoing support and access to resources with my DD  Carmen Back. (pt-stated)      Personal Plan  For ongoing support and access to resources I will contact my DD  Carmen Back @ [phone].        COMPLETED: I have accomplished starting to save money for a bike, but will wait until next summer to pursue purchasing a bike. (pt-stated)      Personal Plan  I have decided I want to wait until next summer to purchase a bike.            Advanced Directive/Living Will: The patient was given information regarding Adanced Directives/Living Will    Clinical Emergency Plan    Long-Term Complications of Diabetes can cause health problems over time. These are called complications. They are more likely to happen if your blood sugar is often too high. Over time, high blood sugar can damage blood vessels in your body. It is important to keep your blood sugar in your target range. This can help prevent or delay complications from diabetes.  Possible complications  Complications of diabetes include:    Eye problems, including damage to the blood vessels in the eyes (retinopathy), pressure in the eye (glaucoma), and clouding of the eye s lens (a cataract). Eye problems can eventually lead to irreversible blindness.     Tooth and gum problems (periodontal disease), causing loss of teeth and bone    Blood vessel (vascular) disease leading to circulation problems, heart attack or stroke, or a need for amputation of a limb     Problems with sexual function leading to erectile dysfunction in men and sexual discomfort in women     Kidney disease (nephropathy) can eventually lead to kidney failure, which may require dialysis or kidney transplant     Nerve problems (neuropathy), causing pain or loss of feeling in your feet and other parts of your body, potentially leading  to an amputation of a limb     High blood pressure (hypertension), putting strain on your heart and blood vessels    Serious infections, possibly leading to loss of toes, feet, or limbs  How to avoid complications  The serious consequences of these complications may be avoidable for most people with diabetes by managing your blood glucose, blood pressure, and cholesterol levels. This can help you feel better and stay healthy. You can manage diabetes by tracking your blood sugar. You can also eat healthy and exercise to avoid gaining weight. And you should take medicine if directed by your healthcare provider.  Call your health care provider if:    You vomit or have diarrhea for more than 6 hours.    Your blood glucose level is higher than usual or over 250 mg/dL after you have taken extra insulin (if recommended in your sick-day plan).    You take oral medicine for diabetes, and your blood sugar is higher than usual or over 250 mg/dL, before a meal and stays that high for more than 24 hours.    Your blood glucose is lower than usual or less than 70 mg/dL    You have moderate to large amounts of ketones in your blood or urine.    You aren t better after 2 days.           Understanding Anxiety Disorders  Almost everyone gets nervous now and then. It s normal to have knots in your stomach before a test, or for your heart to race on a first date. But an anxiety disorder is much more than a case of nerves. In fact, its symptoms may be overwhelming. But treatment can relieve many of these symptoms. Talking to your healthcare provider is the first step.  What are anxiety disorders?  An anxiety disorder causes intense feelings of panic and fear. These feelings may arise for no apparent reason. And they tend to recur again and again. They may prevent you from coping with life and cause you great distress. As a result, you may avoid anything that triggers your fear. In extreme cases, you may never leave the house. Anxiety  disorders may cause other symptoms, such as:    Obsessive thoughts you can t control    Constant nightmares or painful thoughts of the past    Nausea, sweating, and muscle tension    Trouble sleeping or concentrating  What causes anxiety disorders?  Anxiety disorders tend to run in families. For some people, childhood abuse or neglect may play a role. For others, stressful life events or trauma may trigger anxiety disorders. Anxiety can trigger low self-esteem and poor coping skills.     Common anxiety disorders    Panic disorder. This causes an intense fear of being in danger.    Phobias. These are extreme fears of certain objects, places, or events.    Obsessive-compulsive disorder. This causes you to have unwanted thoughts and urges. You also may perform certain actions over and over.    Posttraumatic stress disorder. This occurs in people who have survived a terrible ordeal. It can cause nightmares and flashbacks about the event.    Generalized anxiety disorder. This causes constant worry that can greatly disrupt your life.   Getting better  You may believe that nothing can help you. Or, you might fear what others may think. But most anxiety symptoms can be eased. Having an anxiety disorder is nothing to be ashamed of. Most people do best with treatment that combines medicine and therapy. These aren t cures. But they can help you live a healthier life.    4920-3768 [employer]. [address] [place] [address], [place] [address]. All rights reserved. This information is not intended as a substitute for professional medical care. Always follow your healthcare professional's instructions.       All [facility] clinic patients have access to a Nurse 24 hours a day, 7 days a week.  If you have questions or want advice from a Nurse, please know [facility] is here for you.  You can call your clinic and they will connect you or you can call [facility] at [phone].  [facility] also has Walk In Care clinics  in multiple locations.  Call the number listed above for more information about our Walk In Care clinics or visit the [employer] website at [email or web address].

## 2021-05-31 NOTE — PROGRESS NOTES
"Clinic Care Coordination Contact  Clinic Care Coordination Contact    Care Team Conversation:    SW received detailed returned message from pts DD  Carmen Back.  Carmen stated that she has ongoing contact with pt.  Her next appt is scheduled on September 11.  Carmen states that there are some \" on and off points of contention\" between pt and PCA Zuri.  Carmen supports Chioma in sorting this out when it occurs.  Carmen says she always offers pt alternative living options.  However, Carmen shared that pt just signed a 1 year lease with Zuri.  Carmen does not see any gaps in care/services for pt at this time.        Goals addressed this encounter:   Goals Addressed                 This Visit's Progress       Patient Stated      COMPLETED: SW goal:  I have ongoing support and access to resources through my DD  (pt-stated)        Action steps to achieve this goal  1.  [facility] SW will consult with Carmen, , to gain understanding of current services  2.  [facility] SW will explore other services that may be available to utilize with DD waiver      Date goal set:  6/6/2019 BC  Completed: 8/29/19             Plan:    SW completed pt goal.  SW assessed that pt can be moved to maintenance. Pt has adequate support/services in place through DD .     RN/CHW:  Follow standard work to move pt to maintenance                   "

## 2021-05-31 NOTE — PROGRESS NOTES
"Next Outreach: 9/25/19  Planned Outreach Frequency: monthly  Preferred Phone Number: Zuri [phone]. (Prefers all outreach/communications through [employer])     SW Assessment Date: 6/6/19    RN Only Goals: None  SW Only Goals: \" SW: I would like to know more about what services may be available with my DD waiver within 1-3 months\"       "

## 2021-05-31 NOTE — PROGRESS NOTES
Clinic Care Coordination Contact    Situation-Received a request from [facility] FARRUKH for patient to transition to [facility] Maintenance.    Background- Patient enrolled 6/6/2019.  Seeking assistance to coordinate with her DD  to problem solve and to continue to live independently.   FARRUKH and MARYANA  able to connect and discuss Chioma.  Read notes for further updates.    Action-Chart review. All goals completed.  No new goals identified.  Will notify PCP.    Response: Patient to transition to [facility] Maintenance.        jLong-Term Complications of Diabetes can cause health problems over time. These are called complications. They are more likely to happen if your blood sugar is often too high. Over time, high blood sugar can damage blood vessels in your body. It is important to keep your blood sugar in your target range. This can help prevent or delay complications from diabetes.  Possible complications  Complications of diabetes include:    Eye problems, including damage to the blood vessels in the eyes (retinopathy), pressure in the eye (glaucoma), and clouding of the eye s lens (a cataract). Eye problems can eventually lead to irreversible blindness.     Tooth and gum problems (periodontal disease), causing loss of teeth and bone    Blood vessel (vascular) disease leading to circulation problems, heart attack or stroke, or a need for amputation of a limb     Problems with sexual function leading to erectile dysfunction in men and sexual discomfort in women     Kidney disease (nephropathy) can eventually lead to kidney failure, which may require dialysis or kidney transplant     Nerve problems (neuropathy), causing pain or loss of feeling in your feet and other parts of your body, potentially leading to an amputation of a limb     High blood pressure (hypertension), putting strain on your heart and blood vessels    Serious infections, possibly leading to loss of toes, feet, or limbs  How to avoid complications  The  serious consequences of these complications may be avoidable for most people with diabetes by managing your blood glucose, blood pressure, and cholesterol levels. This can help you feel better and stay healthy. You can manage diabetes by tracking your blood sugar. You can also eat healthy and exercise to avoid gaining weight. And you should take medicine if directed by your healthcare provider.  Call your health care provider if:    You vomit or have diarrhea for more than 6 hours.    Your blood glucose level is higher than usual or over 250 mg/dL after you have taken extra insulin (if recommended in your sick-day plan).    You take oral medicine for diabetes, and your blood sugar is higher than usual or over 250 mg/dL, before a meal and stays that high for more than 24 hours.    Your blood glucose is lower than usual or less than 70 mg/dL    You have moderate to large amounts of ketones in your blood or urine.    You aren t better after 2 days.        Understanding Anxiety Disorders  Almost everyone gets nervous now and then. It s normal to have knots in your stomach before a test, or for your heart to race on a first date. But an anxiety disorder is much more than a case of nerves. In fact, its symptoms may be overwhelming. But treatment can relieve many of these symptoms. Talking to your healthcare provider is the first step.  What are anxiety disorders?  An anxiety disorder causes intense feelings of panic and fear. These feelings may arise for no apparent reason. And they tend to recur again and again. They may prevent you from coping with life and cause you great distress. As a result, you may avoid anything that triggers your fear. In extreme cases, you may never leave the house. Anxiety disorders may cause other symptoms, such as:  Obsessive thoughts you can t control  Constant nightmares or painful thoughts of the past  Nausea, sweating, and muscle tension  Trouble sleeping or concentrating  What causes  anxiety disorders?  Anxiety disorders tend to run in families. For some people, childhood abuse or neglect may play a role. For others, stressful life events or trauma may trigger anxiety disorders. Anxiety can trigger low self-esteem and poor coping skills.    Common anxiety disorders  Panic disorder. This causes an intense fear of being in danger.  Phobias. These are extreme fears of certain objects, places, or events.  Obsessive-compulsive disorder. This causes you to have unwanted thoughts and urges. You also may perform certain actions over and over.  Posttraumatic stress disorder. This occurs in people who have survived a terrible ordeal. It can cause nightmares and flashbacks about the event.  Generalized anxiety disorder. This causes constant worry that can greatly disrupt your life.   Getting better  You may believe that nothing can help you. Or, you might fear what others may think. But most anxiety symptoms can be eased. Having an anxiety disorder is nothing to be ashamed of. Most people do best with treatment that combines medicine and therapy. These aren t cures. But they can help you live a healthier life.    0279-2660 [employer]. [address] [place] [address], [place] [address]. All rights reserved. This information is not intended as a substitute for professional medical care. Always follow your healthcare professional's instructions.     .

## 2021-05-31 NOTE — PROGRESS NOTES
1.  DM (diabetes mellitus) (H)  -     Glycosylated Hemoglobin A1c  -     [facility] RED    A1c=6.5  Meds:  Metformin 2000mg  No Microalbuminuria; d/c ACEi  On statin  Not a smoker  Feet exam: normal 8/14/19  Eye exam: will schedule this year  Lost few lbs weight  Counseled healthy lifestyle modifications  Discussed general issues about diabetes pathophysiology and management.  Addressed ADA diet.  Suggested low cholesterol diet.  Encouraged aerobic exercise.  Discussed foot care.  Reminded to get yearly retinal exam.  Discussed ways to avoid symptomatic hypoglycemia.  F/u in 3 months    2.  H/o Acute cystitis   Asymptomatic  UC for Test of cure  -     Culture, Urine      Subjective:   Chioma Mccormick is a 44 y.o. female who presents for follow up of diabetes. Evaluation to date has been via hemoglobin A1C and home blood sugars. Home sugars: 110s  Lab Results   Component Value Date    HGBA1C 6.5 (H) 08/14/2019     The patient has been eating healthy and lost a few pounds.  She feels good.  No questions or concerns today.    5 mg for a remote history of microalbuminuria.  For the last year she has not had any trace of microalbuminuria.    The following portions of the patient's history were reviewed and updated as appropriate: allergies, current medications, past family history, past medical history, past social history, past surgical history and problem list.    Review of Systems  A 12 point comprehensive review of systems was negative except as noted.     Current Outpatient Medications   Medication Sig Dispense Refill     blood glucose test (CONTOUR TEST STRIPS) strips Use 1 each As Directed once daily. Dispense brand per patient's insurance at pharmacy discretion. 200 strip 3     calcium-vitamin D (CALCIUM-VITAMIN D) 500 mg(1,250mg) -200 unit per tablet TAKE 1 TABLET BY MOUTH 2 (TWO) TIMES A DAY WITH MEALS. 180 tablet 3     generic lancets (MICROLET LANCET) TEST ONCE DAILY AS DIRECTED 200 each 3     metFORMIN  (GLUCOPHAGE) 1000 MG tablet Take 1 tablet (1,000 mg total) by mouth 2 (two) times a day. 180 tablet 2     simvastatin (ZOCOR) 10 MG tablet TAKE 1 TABLET BY MOUTH NIGHTLY AT BEDTIME 90 tablet 2     No current facility-administered medications for this visit.           Objective:    Physical Exam   Vitals:    08/14/19 0856   BP: 118/66   Pulse: 72      Constitutional: Patient is oriented to person, place, and time. Patient appears well-developed and well-nourished. No distress.   Head: Normocephalic and atraumatic.   Right Ear: External ear normal.   Left Ear: External ear normal.   Nose: Nose normal.   Mouth/Throat: Oropharynx is clear and moist. No oropharyngeal exudate.   Eyes: Conjunctivae and EOM are normal. Pupils are equal, round, and reactive to light. Right eye exhibits no discharge. Left eye exhibits no discharge. No scleral icterus.   Neck: Neck supple. No JVD present. No tracheal deviation present. No thyromegaly present.   Cardiovascular: Normal rate, regular rhythm, normal heart sounds and intact distal pulses. No murmur heard.   Pulmonary/Chest: Effort normal and breath sounds normal. No stridor. No respiratory distress. Patient has no wheezes, no rales, exhibits no tenderness.   Abdominal: Soft. Bowel sounds are normal. Patient exhibits no distension and no mass. There is no tenderness. There is no rebound and no guarding.   Lymphadenopathy:  Patient has no cervical adenopathy.   Neurological: Patient is alert and oriented to person, place, and time. Patient has normal reflexes. No cranial nerve deficit. Coordination normal.   Skin: Skin is warm and dry. No rash noted. Patient is not diaphoretic. No erythema. No pallor.   Normal feet exam

## 2021-05-31 NOTE — PROGRESS NOTES
Clinic Care Coordination Contact  UTC/Voicemail    SW left voicemail for DD  to consult on pts needs or potential gaps in care.

## 2021-06-01 ENCOUNTER — RECORDS - HEALTHEAST (OUTPATIENT)
Dept: ADMINISTRATIVE | Facility: CLINIC | Age: 46
End: 2021-06-01

## 2021-06-01 VITALS — WEIGHT: 154 LBS | BODY MASS INDEX: 31.1 KG/M2

## 2021-06-01 VITALS — WEIGHT: 159.31 LBS | BODY MASS INDEX: 32.18 KG/M2

## 2021-06-01 NOTE — TELEPHONE ENCOUNTER
Test Results  Who is calling?:  Zuri , Care giver   Who ordered the test:  PCP  Type of test: Lab  Date of test:  09/05/19  Where was the test performed:  [facility]  What are your questions/concerns?: Please call caregiver with results and next best course of action.   Results were seen on My Chart.  Okay to leave a detailed message?:  Yes

## 2021-06-01 NOTE — TELEPHONE ENCOUNTER
Informed patient's care giver Zuri of the below message in regards to the Urine Culture results. Nothing further is needed at this time.  Thank you, Keisha Tim

## 2021-06-01 NOTE — PROGRESS NOTES
ASSESSMENT/PLAN:    Acute cystitis without hematuria, treated  44-year-old female with diabetes and history of recurrent urinary tract infection comes in today for test of cure.  She completed her antibiotics.  She is asymptomatic at this time.  We will do a urine culture for test of cure.    -     Urinalysis  -     Culture, Urine    Orders Placed This Encounter   Procedures     Culture, Urine     Urinalysis       CHIEF COMPLAINT:  Chief Complaint   Patient presents with     Follow-up     urine test       HISTORY OF PRESENT ILLNESS:  Chioma is a 44 y.o. female presenting to the clinic today for a follow up on a urinary tract infection. She is accompanied by Zuri, her roommate and her mom's friend.     Urinary Tract Infection: She had a urine test on 8/14/2019 which confirmed a diagnosis of acute cystitis without hematuria. She was given sulfamethoxazole-trimethoprim (800-160 mg per tablet) to treat the urinary tract infection. She has had recurrent, asymptomatic urinary tract infections. She finished the antibiotic on 8/30 and is here to make sure that the infection is gone. Zuri inquires about the cause of these infections. Today, she has no pain with urination, no odor, no blood and no current urgency to urinate.     Diabetes: She has well controlled diabetes. She was in for a diabetes follow-up on August 14. Her last A1c was 6.5% on 8/14/19.   Zuir switched her from 2% milk to Silk Milk (almond milk) to reduce her sugar intake. She also switched her from white bread to whole wheat and has Carlie drinking cranberry juice and yogurt on Mondays, Wednesdays and Fridays.     REVIEW OF SYSTEMS:   Constitutional: Negative.   HENT: Negative.   Eyes: Negative.   Respiratory: Negative.   Cardiovascular: Negative.   Gastrointestinal: Negative.   Endocrine: Negative.   Genitourinary: Negative.   Musculoskeletal: Negative.   Skin: Negative.   Allergic/Immunologic: Negative.   Neurological: Negative.   Hematological:  Negative.   Psychiatric/Behavioral: Negative.   All other systems are negative.    PFSH:  Her cousin, Sugey, is getting  on 9/21. She went to [facility] last month with a date. Her sister did her hair for prom. She is going to a friend's house this weekend and they are going to go to a mall.     TOBACCO USE:  Social History     Tobacco Use   Smoking Status Never Smoker   Smokeless Tobacco Never Used       VITALS:  Vitals:    09/05/19 0836   BP: 118/78   Pulse: 72   Weight: 147 lb 9 oz (66.9 kg)     Wt Readings from Last 3 Encounters:   09/05/19 147 lb 9 oz (66.9 kg)   08/14/19 145 lb 1 oz (65.8 kg)   06/26/19 147 lb (66.7 kg)       PHYSICAL EXAM:  Constitutional: Patient is oriented to person, place, and time. Patient appears well-developed and well-nourished. No distress.   Head: Normocephalic and atraumatic.   Right Ear: External ear normal.   Left Ear: External ear normal.   Nose: Nose normal.   Eyes: Conjunctivae and EOM are normal. Right eye exhibits no discharge. Left eye exhibits no discharge. No scleral icterus.   Neurological: Patient is alert and oriented to person, place, and time. No cranial nerve deficit. Coordination normal.   Skin: No rash noted. Patient is not diaphoretic. No erythema. No pallor.    ADDITIONAL HISTORY SUMMARIZED (2): None.  DECISION TO OBTAIN EXTRA INFORMATION (1): None.   RADIOLOGY TESTS (1): None.  LABS (1): Reviewed labs from 8/14/19 and ordered labs today.   MEDICINE TESTS (1): None.  INDEPENDENT REVIEW (2 each): None.     The visit lasted a total of 9 minutes face to face with the patient. Over 50% of the time was spent counseling and educating the patient about urinary tract infections.    I, Carol Higgins,  am scribing for and in the presence of, Dr. Amador.    I, Dr. Amador, personally performed the services described in this documentation, as scribed by Carol Higgins in my presence, and it is both accurate and complete.    MEDICATIONS:  Current Outpatient Medications    Medication Sig Dispense Refill     blood glucose test (CONTOUR TEST STRIPS) strips Use 1 each As Directed once daily. Dispense brand per patient's insurance at pharmacy discretion. 200 strip 3     calcium-vitamin D (CALCIUM-VITAMIN D) 500 mg(1,250mg) -200 unit per tablet TAKE 1 TABLET BY MOUTH 2 (TWO) TIMES A DAY WITH MEALS. 180 tablet 3     generic lancets (MICROLET LANCET) TEST ONCE DAILY AS DIRECTED 200 each 3     metFORMIN (GLUCOPHAGE) 1000 MG tablet TAKE 1 TABLET BY MOUTH TWICE A  tablet 2     simvastatin (ZOCOR) 10 MG tablet TAKE 1 TABLET BY MOUTH NIGHTLY AT BEDTIME 90 tablet 2     No current facility-administered medications for this visit.        Total data points:1

## 2021-06-02 VITALS — BODY MASS INDEX: 32.16 KG/M2 | WEIGHT: 159.25 LBS

## 2021-06-02 VITALS — BODY MASS INDEX: 30.09 KG/M2 | WEIGHT: 149 LBS

## 2021-06-02 VITALS — HEIGHT: 59 IN | BODY MASS INDEX: 30.88 KG/M2 | WEIGHT: 153.19 LBS

## 2021-06-02 VITALS — BODY MASS INDEX: 30.7 KG/M2 | WEIGHT: 152 LBS

## 2021-06-02 NOTE — TELEPHONE ENCOUNTER
Reached out to patient's caregiver Zuri and relayed the below message. Zuri stated she will call back later today and schedule an appointment. Keisha Tim

## 2021-06-02 NOTE — TELEPHONE ENCOUNTER
Refill Approved    Rx renewed per Medication Renewal Policy. Medication was last renewed on 1/22/19.    Jazmyn King, [facility] Triage/Med Refill 10/8/2019     Requested Prescriptions   Pending Prescriptions Disp Refills     simvastatin (ZOCOR) 10 MG tablet [Pharmacy Med Name: SIMVASTATIN 10 MG TABLET] 90 tablet 2     Sig: TAKE 1 TABLET BY MOUTH NIGHTLY AT BEDTIME       Statins Refill Protocol (Hmg CoA Reductase Inhibitors) Passed - 10/6/2019  2:08 PM        Passed - PCP or prescribing provider visit in past 12 months      Last office visit with prescriber/PCP: 9/5/2019 Saud Coley MD OR same dept: 9/5/2019 Saud Coley MD OR same specialty: 9/5/2019 Saud Coley MD  Last physical: 4/17/2019 Last MTM visit: Visit date not found   Next visit within 3 mo: Visit date not found  Next physical within 3 mo: Visit date not found  Prescriber OR PCP: Saud Jama MD  Last diagnosis associated with med order: 1. Dyslipidemia  - simvastatin (ZOCOR) 10 MG tablet [Pharmacy Med Name: SIMVASTATIN 10 MG TABLET]; TAKE 1 TABLET BY MOUTH NIGHTLY AT BEDTIME  Dispense: 90 tablet; Refill: 2    If protocol passes may refill for 12 months if within 3 months of last provider visit (or a total of 15 months).

## 2021-06-02 NOTE — TELEPHONE ENCOUNTER
Name of form/paperwork: Other:  [employer] Form  Have you been seen for this request: 09/05/2019  Do we have the form: Yes- 10/29/2019  When is form needed by: 11/07/2019  How would you like the form returned: Requesting to  the form.  Fax Number: N/A  Patient Notified form requests are processed in 3-5 business days: Yes  (If patient needs form sooner, please note that in this message.)  Okay to leave a detailed message? Yes

## 2021-06-02 NOTE — TELEPHONE ENCOUNTER
Please ask the patient to schedule an xray only appointment.  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

## 2021-06-02 NOTE — TELEPHONE ENCOUNTER
lisinopril (PRINIVIL,ZESTRIL) 5 MG tablet [[id number]]     Electronically signed by: Lynette Vasquez RN on 10/25/18 2201 Status: Discontinued   Ordering user: Lynette Vasquez RN 10/25/18 2201 Ordering provider: Saud Coley MD   Authorized by: Saud Coley MD   Frequency: DAILY 10/25/18 - 08/14/19  Discontinued by: Saud Coley MD 08/14/19 0925

## 2021-06-03 VITALS
SYSTOLIC BLOOD PRESSURE: 118 MMHG | BODY MASS INDEX: 29.8 KG/M2 | DIASTOLIC BLOOD PRESSURE: 78 MMHG | WEIGHT: 147.56 LBS | HEART RATE: 72 BPM

## 2021-06-03 VITALS — WEIGHT: 145.06 LBS | BODY MASS INDEX: 29.24 KG/M2 | HEIGHT: 59 IN

## 2021-06-03 VITALS — BODY MASS INDEX: 29.69 KG/M2 | WEIGHT: 147 LBS

## 2021-06-03 NOTE — TELEPHONE ENCOUNTER
"Patient has a lab appointment on 11/27 but there are no orders. Notes say \"a1c / urine test\". Please place orders as needed.    Edith Schultz    "

## 2021-06-03 NOTE — PROGRESS NOTES
Patient has been on [facility] Maintenance since 9/3/19 and has no other goals that this patient would like to work with Clinic Care Coordination/Health Care Home. Care Guide sent request to [facility] RN pool to review for Graduation and update emergency plan.

## 2021-06-04 VITALS
DIASTOLIC BLOOD PRESSURE: 84 MMHG | TEMPERATURE: 99.5 F | WEIGHT: 156 LBS | BODY MASS INDEX: 31.51 KG/M2 | HEART RATE: 88 BPM | SYSTOLIC BLOOD PRESSURE: 110 MMHG

## 2021-06-04 VITALS
WEIGHT: 150.31 LBS | SYSTOLIC BLOOD PRESSURE: 110 MMHG | BODY MASS INDEX: 30.36 KG/M2 | HEART RATE: 69 BPM | DIASTOLIC BLOOD PRESSURE: 78 MMHG | OXYGEN SATURATION: 99 %

## 2021-06-04 VITALS
DIASTOLIC BLOOD PRESSURE: 77 MMHG | HEART RATE: 69 BPM | WEIGHT: 153 LBS | SYSTOLIC BLOOD PRESSURE: 120 MMHG | OXYGEN SATURATION: 98 % | BODY MASS INDEX: 30.9 KG/M2

## 2021-06-04 VITALS
HEART RATE: 68 BPM | BODY MASS INDEX: 30.15 KG/M2 | SYSTOLIC BLOOD PRESSURE: 120 MMHG | DIASTOLIC BLOOD PRESSURE: 80 MMHG | HEIGHT: 59 IN | WEIGHT: 149.56 LBS

## 2021-06-05 VITALS
HEART RATE: 65 BPM | DIASTOLIC BLOOD PRESSURE: 68 MMHG | BODY MASS INDEX: 28.93 KG/M2 | SYSTOLIC BLOOD PRESSURE: 122 MMHG | WEIGHT: 143.25 LBS | OXYGEN SATURATION: 98 %

## 2021-06-05 VITALS
HEART RATE: 114 BPM | BODY MASS INDEX: 28.9 KG/M2 | TEMPERATURE: 97.3 F | SYSTOLIC BLOOD PRESSURE: 112 MMHG | WEIGHT: 143.06 LBS | OXYGEN SATURATION: 95 % | DIASTOLIC BLOOD PRESSURE: 72 MMHG

## 2021-06-05 NOTE — PROGRESS NOTES
"ASSESSMENT/PLAN:  Recurrent UTI.  No urinary symptoms today but feeling \"more tired\"  UA was unremarkable.  Await UC  Advised exercise  She will come back in 2-3 wks for DM visit.  If still feeling tired by then, will recommend labs (thyroid, cbc, vitamin D)  -     Urinalysis  -     Culture, Urine    Type 2 diabetes mellitus without complication, without long-term current use of insulin (H)  -     Microalbumin, Random Urine  Will come back for DM med check & fasting labs      Orders Placed This Encounter   Procedures     Culture, Urine     Urinalysis     Microalbumin, Random Urine     CHIEF COMPLAINT:  Chief Complaint   Patient presents with     check urine     not having any issue     Medication Refill     wants a new machine for DM check     HISTORY OF PRESENT ILLNESS:  Chioma is a 44 y.o. female presenting to the clinic today for abnormal fatigue. She is accompanied by Zuri. Zuri says that the patient has been fatigued lately. The patient has been using pads and has not had any urinary accidents. However, Zuri believes that the patient has not been herself lately. Zuri requests to have a urinalysis done. The patient has been drinking a lot of water, but she has not been exercising regularly. The patient's appetite is normal. She has been sleeping well. She denies any abdominal or back pain. She denies any pain with urination.     REVIEW OF SYSTEMS:   Constitutional: Negative.   HENT: Negative.   Eyes: Negative.   Respiratory: Negative.   Cardiovascular: Negative.   Gastrointestinal: Negative.   Endocrine: Negative.   Genitourinary: Negative.   Musculoskeletal: Negative.   Skin: Negative.   Allergic/Immunologic: Negative.   Neurological: Negative.   Hematological: Negative.   Psychiatric/Behavioral: Negative.   All other systems are negative.    TOBACCO USE:  Social History     Tobacco Use   Smoking Status Never Smoker   Smokeless Tobacco Never Used     VITALS:  Vitals:    02/05/20 0846   BP: 120/80 " "  Pulse: 68   Weight: 149 lb 9 oz (67.8 kg)   Height: 4' 11\" (1.499 m)     Wt Readings from Last 3 Encounters:   02/05/20 149 lb 9 oz (67.8 kg)   09/05/19 147 lb 9 oz (66.9 kg)   08/14/19 145 lb 1 oz (65.8 kg)       PHYSICAL EXAM:  Constitutional: Patient is oriented to person, place, and time. Patient appears well-developed and well-nourished. No distress.   Head: Normocephalic and atraumatic.   Right Ear: External ear normal.   Left Ear: External ear normal.   Nose: Nose normal.   Abdominal: Soft. Bowel sounds are normal. Patient exhibits no distension and no mass. There is no tenderness. There is no rebound and no guarding.   Back: No tenderness to percussion of the costal vertebral angle.   Neurological: Patient is alert and oriented to person, place, and time. Patient has normal reflexes. No cranial nerve deficit. Coordination normal.   Skin: Skin is warm and dry. No rash noted. Patient is not diaphoretic. No erythema. No pallor.    ADDITIONAL HISTORY SUMMARIZED (2): None.  DECISION TO OBTAIN EXTRA INFORMATION (1): None.   RADIOLOGY TESTS (1): None.  LABS (1): Ordered urinalysis today.   MEDICINE TESTS (1): None.  INDEPENDENT REVIEW (2 each): None.     Carol CHASE, am scribing for and in the presence of, Dr. Amador.    IDr. Amador, personally performed the services described in this documentation, as scribed by Carol Higgins in my presence, and it is both accurate and complete.    MEDICATIONS:  Current Outpatient Medications   Medication Sig Dispense Refill     blood glucose test (CONTOUR TEST STRIPS) strips Use 1 each As Directed once daily. Dispense brand per patient's insurance at pharmacy discretion. 200 strip 3     calcium-vitamin D (CALCIUM-VITAMIN D) 500 mg(1,250mg) -200 unit per tablet TAKE 1 TABLET BY MOUTH 2 (TWO) TIMES A DAY WITH MEALS. 180 tablet 3     generic lancets (MICROLET LANCET) TEST ONCE DAILY AS DIRECTED 200 each 3     metFORMIN (GLUCOPHAGE) 1000 MG tablet TAKE 1 TABLET BY MOUTH " TWICE A  tablet 2     simvastatin (ZOCOR) 10 MG tablet TAKE 1 TABLET BY MOUTH NIGHTLY AT BEDTIME 90 tablet 3     No current facility-administered medications for this visit.        Total data points:1

## 2021-06-05 NOTE — TELEPHONE ENCOUNTER
Reached out to Zuri acevedo's PCA and relayed the below message. No additional questions at this time. Keisha Tim

## 2021-06-05 NOTE — TELEPHONE ENCOUNTER
Test Results  Who is calling?:  PCA  Who ordered the test:  Saud Coley MD  Type of test: Lab  Date of test:  2/5/20  Where was the test performed:  In clinic  What are your questions/concerns?:  Please call PCA when sensitivity is back to know what medication patient should start for UTI.    Okay to leave a detailed message?:  Yes

## 2021-06-07 NOTE — PROGRESS NOTES
"Chioma Mccormick is a 45 y.o. female who is being evaluated via a billable telephone visit.      The patient has been notified of following:     \"This telephone visit will be conducted via a call between you and your physician/provider. We have found that certain health care needs can be provided without the need for a physical exam.  This service lets us provide the care you need with a short phone conversation.  If a prescription is necessary we can send it directly to your pharmacy.  If lab work is needed we can place an order for that and you can then stop by our lab to have the test done at a later time.    If during the course of the call the physician/provider feels a telephone visit is not appropriate, you will not be charged for this service.\"     Patient has given verbal consent to a Telephone visit? Yes    Chioma Mccormick complains of    Chief Complaint   Patient presents with     Diabetes     due  A1C     Follow-up     Urine       I have reviewed and updated the patient's Past Medical History, Social History, Family History and Medication List.    ALLERGIES  Patient has no known allergies.    Additional provider notes:     Spoke to pt and her PCA   No symptoms   Still taking metformin and simvastatin  No urine frequency, urgency, dysuria, hematuria    Lab Results   Component Value Date    HGBA1C 6.1 (H) 11/27/2019     Lab Results   Component Value Date    MICROALBUR 0.54 02/05/2020     Lab Results   Component Value Date    CHOL 95 04/17/2019    CHOL 101 08/22/2018    CHOL 83 03/01/2017     Lab Results   Component Value Date    HDL 28 (L) 04/17/2019    HDL 31 (L) 08/22/2018    HDL 30 (L) 03/01/2017     Lab Results   Component Value Date    LDLCALC 34 04/17/2019    LDLCALC 39 08/22/2018    LDLCALC 31 03/01/2017     Lab Results   Component Value Date    TRIG 166 (H) 04/17/2019    TRIG 156 (H) 08/22/2018    TRIG 109 03/01/2017     No components found for: CHOLHDL     Results for orders placed or performed " in visit on 04/17/19   Comprehensive Metabolic Panel   Result Value Ref Range    Sodium 138 136 - 145 mmol/L    Potassium 4.2 3.5 - 5.0 mmol/L    Chloride 102 98 - 107 mmol/L    CO2 26 22 - 31 mmol/L    Anion Gap, Calculation 10 5 - 18 mmol/L    Glucose 162 (H) 70 - 125 mg/dL    BUN 11 8 - 22 mg/dL    Creatinine 0.70 0.60 - 1.10 mg/dL    GFR MDRD Af Amer >60 >60 mL/min/1.73m2    GFR MDRD Non Af Amer >60 >60 mL/min/1.73m2    Bilirubin, Total 0.5 0.0 - 1.0 mg/dL    Calcium 9.3 8.5 - 10.5 mg/dL    Protein, Total 6.7 6.0 - 8.0 g/dL    Albumin 4.0 3.5 - 5.0 g/dL    Alkaline Phosphatase 57 45 - 120 U/L    AST 19 0 - 40 U/L    ALT 27 0 - 45 U/L       Assessment/Plan:  1. Type 2 diabetes mellitus without complication, without long-term current use of insulin (H)  A1c=6.1  Continue with metformin 2000mg  Counseled healthy lifestyle modifications  Recommend recheck A1c, microalbumin, and CMP in the next few months when it's safer to come to the clinic     2. Dyslipidemia  Stable on simvastatin  Counseled healthy lifestyle modifications  Recommend recheck lipid in the next few months when it's safer to come to the clinic     3. Recurrent UTI  No symptoms at present    Phone call duration:  25 minutes    Saud Jama MD

## 2021-06-07 NOTE — TELEPHONE ENCOUNTER
DM foot exam cancelled due to clinic closure. PCA asked if Dr. Castro would be able to send an order for 3 pairs DM shoe inserts to [facility].     She received shoes from another company but was never fit for inserts.

## 2021-06-08 NOTE — PROGRESS NOTES
"Chioma Mccormick is a 45 y.o. female who is being evaluated via a billable video visit.      The patient has been notified of following:     \"This video visit will be conducted via a call between you and your physician/provider. We have found that certain health care needs can be provided without the need for an in-person physical exam.  This service lets us provide the care you need with a video conversation.  If a prescription is necessary we can send it directly to your pharmacy.  If lab work is needed we can place an order for that and you can then stop by our lab to have the test done at a later time.    Video visits are billed at different rates depending on your insurance coverage. Please reach out to your insurance provider with any questions.    If during the course of the call the physician/provider feels a video visit is not appropriate, you will not be charged for this service.\"    Patient has given verbal consent to a Video visit? Yes    Patient would like to receive their AVS by AVS Preference: Kenn.        Will anyone else be joining your video visit? Yes: Zuri.         Video Start Time: 4:21 PM    Additional provider notes:   Chioma Mccormick 45 y.o.. female with   Chief Complaint   Patient presents with     hearing loss     right ear is hard to hear since cleaning at the clinic last week     Follow-up     Lab result A1C and urine result        S:    A1c=6.0 with metformin 2000mg  No growth on urine culture   Right ear cleaning done in the clinic last week.  Recurred 3-4 days later still plugged.       O:  Constitutional: Patient is oriented to person, place, and time. Patient appears well-developed and well-nourished. No distress.   Head: Normocephalic and atraumatic.   Right Ear: External ear normal.   Left Ear: External ear normal.   Nose: Nose normal.   Eyes: Conjunctivae and EOM are normal. Right eye exhibits no discharge. Left eye exhibits no discharge. No scleral icterus. "   Neurological: Patient is alert and oriented to person, place, and time.   The patient has good eye contact.  No psychomotor retardation or stereotypical behaviors.  Speech was regular rate, regular rhythm, adequate responses.  Mood was stable and affect was congruent mood.  No suicidal or homicidal intent.  No hallucination.      A/P:  Impacted cerumen of right ear  Debrox into the right ear  If still plugged then schedule an inperson visit next week  -     carbamide peroxide (DEBROX) 6.5 % otic solution; Administer 5 drops to the right ear 2 (two) times a day.  Dispense: 15 mL; Refill: 2    DM2  Continue with metformin 2000mg    Video-Visit Details    Type of service:  Video Visit    Video End Time (time video stopped): 4:43 PM  Originating Location (pt. Location): Home    Distant Location (provider location):  [facility]/OB     Platform used for Video Visit: Gila Jama MD

## 2021-06-08 NOTE — TELEPHONE ENCOUNTER
Chioma's PCA is calling.  Chioma was seen in clinic last week for an impacted right ear with lavage.  Now Chioma is c/o not being able to hear very well in that same ear. Denies pain of the ear. PCA is upset that she was not allowed to come into the clinic with Chioma, as Chioma cannot read or write, and has difficulty expressing her needs.  Would like to speak with Dr. Amador.  Transferred to scheduling for a telephone visit with PCP.    Reason for Disposition    ALL other adults with decreased hearing that has gradually decreased    Protocols used: HEARING LOSS OR CHANGE-A-AH

## 2021-06-08 NOTE — TELEPHONE ENCOUNTER
----- Message from Geri Dugan MD sent at 5/26/2020  4:15 PM CDT -----  Dear Chioma,    It was a pleasure to see you in the office today    I reviewed Your  recent  lab results  Showing no urine infection and diabetes in improving Please feel free to call back for any concerns or questions.    Thank you again for allowing me to be a part of your care!    Sincerely,      Geri Dugan MD

## 2021-06-08 NOTE — TELEPHONE ENCOUNTER
"Patient on lab schedule TODAY at 3:45 for \"UA and A1C\" but there are no orders. Please review chart and place orders ASAP. Thanks!   "

## 2021-06-08 NOTE — PROGRESS NOTES
Assessment/plan   Chioma Mccormick is a 45 y.o. female who is  establish patient to my practice here with   Chief Complaint   Patient presents with     Orders Request     a1c and UAI for frequent bladder infections      Ear Problem     right ear ache x2 wks        Chioma was seen today for orders request and ear problem.    Diagnoses and all orders for this visit:    Type 2 diabetes mellitus without complication, without long-term current use of insulin (H)  - due for a1c today which showing improvement , [id number] per pt       Glycosylated Hemoglobin A1c    Plugged feeling in ear, right  Ear lavage done   Urine frequency  - history of repeated infection, UA today came back negative       Urinalysis-UC if Indicated    Trisomy 21 (Down Syndrome)    Impacted cerumen of right ear  Good result , patient feeling better after the lavage       There are no Patient Instructions on file for this visit.  Subjective:      HPI: Chioma Mccormick is a 45 y.o. female is here alone and we talked to her caregiver over the phone , CC as mention in A/P    Zuri her caregiver report that she has so many UTI in the past   The patient's appetite is normal. She has been sleeping well. She denies any abdominal or back pain. She denies any pain with urination.         I have personally reviewed the patient's allergies, medications, past medical history, family history, social history, rooming notes and problem list in detail and updated the patient record as necessary.      No past medical history on file.  No past surgical history on file.  Patient has no known allergies.  Current Outpatient Medications   Medication Sig Dispense Refill     blood glucose test (CONTOUR TEST STRIPS) strips Use 1 each As Directed once daily. Dispense brand per patient's insurance at pharmacy discretion. 200 strip 3     calcium-vitamin D (CALCIUM-VITAMIN D) 500 mg(1,250mg) -200 unit per tablet TAKE 1 TABLET BY MOUTH TWICE A DAY WITH MEALS 60 tablet 11      generic lancets (MICROLET LANCET) TEST ONCE DAILY AS DIRECTED 200 each 3     metFORMIN (GLUCOPHAGE) 1000 MG tablet TAKE 1 TABLET BY MOUTH TWICE A  tablet 2     simvastatin (ZOCOR) 10 MG tablet TAKE 1 TABLET BY MOUTH NIGHTLY AT BEDTIME 90 tablet 3     No current facility-administered medications for this visit.      Family History   Problem Relation Age of Onset     Cancer Mother      Diabetes Neg Hx      Heart disease Neg Hx      Breast cancer Neg Hx      Colon cancer Neg Hx        Patient Active Problem List   Diagnosis     Trisomy 21 (Down Syndrome)     DM (diabetes mellitus) (H)     Anxiety     Mild Intellectual Disabilities     Dyslipidemia       Review of Systems   12 point comprehensive review of systems was negative except as noted and HPI     Social History     Social History Narrative     Not on file       Objective:     Vitals:    05/26/20 1519   BP: 110/84   Pulse: 88   Temp: 99.5  F (37.5  C)   TempSrc: Oral   Weight: 156 lb (70.8 kg)       Physical Exam:   Physical Exam:  General Appearance:  Appears comfortable, Alert, cooperative, no distress,   Head: Normocephalic, without obvious abnormality, atraumatic  Eyes: PERRL, conjunctiva/corneas clear, EOM's intact, both eyes             Nose: Nares normal, no drainage   EAR: impacted ceruman right ear canal. Genetically very narrow canal , good result with ear lavage no ear infection   Throat: Lips, mucosa, and tongue normal; teeth and gums normal  Neck: Supple, symmetrical, trachea midline, no adenopathy;                      Lungs: Clear to auscultation bilaterally, respirations unlabored  Heart: Regular rate and rhythm, S1 and S2 normal, no murmur, rubs or gallop  Neurologic: normal and equal strength bilat in upper and lower extremities        This note has been dictated using voice recognition software. Any grammatical or context distortions are unintentional and inherent to the software  Geri Dugan MD

## 2021-06-08 NOTE — PROGRESS NOTES
ASSESSMENT/PLAN:    Impacted cerumen of right ear  Saline irrigation with complete removal of the ear wax  Post irrigation exam revealed slight erythema of the proximal ear canal, likely from the irrigation  Patient felt better following removal  F/u prn      SUBJECTIVE:    Chioma Mccormick is a 45 y.o. female who came in today     Plugged and painful right ear  Tried debrox at home and had some come out  Still feeling plugged  No fever, sore throat    Review of Systems (except those mentioned above)  Constitutional: Negative.   HENT: Negative.   Eyes: Negative.   Respiratory: Negative.   Cardiovascular: Negative.   Gastrointestinal: Negative.   Endocrine: Negative.   Genitourinary: Negative.   Musculoskeletal: Negative.   Skin: Negative.   Allergic/Immunologic: Negative.   Neurological: Negative.   Hematological: Negative.   Psychiatric/Behavioral: Negative.     Patient Active Problem List    Diagnosis Date Noted     Dyslipidemia 03/24/2016     Trisomy 21 (Down Syndrome)      DM (diabetes mellitus) (H)      Anxiety      Mild Intellectual Disabilities      No Known Allergies  Current Outpatient Medications   Medication Sig Dispense Refill     blood glucose test (CONTOUR TEST STRIPS) strips Use 1 each As Directed once daily. Dispense brand per patient's insurance at pharmacy discretion. 200 strip 3     calcium-vitamin D (CALCIUM-VITAMIN D) 500 mg(1,250mg) -200 unit per tablet TAKE 1 TABLET BY MOUTH TWICE A DAY WITH MEALS 60 tablet 11     carbamide peroxide (DEBROX) 6.5 % otic solution Administer 5 drops to the right ear 2 (two) times a day. 15 mL 2     generic lancets (MICROLET LANCET) TEST ONCE DAILY AS DIRECTED 200 each 3     metFORMIN (GLUCOPHAGE) 1000 MG tablet Take 1 tablet (1,000 mg total) by mouth 2 (two) times a day. 180 tablet 2     simvastatin (ZOCOR) 10 MG tablet TAKE 1 TABLET BY MOUTH NIGHTLY AT BEDTIME 90 tablet 3     No current facility-administered medications for this visit.      No past medical  history on file.  No past surgical history on file.  Social History     Socioeconomic History     Marital status: Single     Spouse name: Not on file     Number of children: Not on file     Years of education: Not on file     Highest education level: Not on file   Occupational History     Not on file   Social Needs     Financial resource strain: Not on file     Food insecurity     Worry: Not on file     Inability: Not on file     Transportation needs     Medical: Not on file     Non-medical: Not on file   Tobacco Use     Smoking status: Never Smoker     Smokeless tobacco: Never Used   Substance and Sexual Activity     Alcohol use: No     Drug use: No     Sexual activity: Not on file   Lifestyle     Physical activity     Days per week: Not on file     Minutes per session: Not on file     Stress: Not on file   Relationships     Social connections     Talks on phone: Not on file     Gets together: Not on file     Attends [employer] service: Not on file     Active member of club or organization: Not on file     Attends meetings of clubs or organizations: Not on file     Relationship status: Not on file     Intimate partner violence     Fear of current or ex partner: Not on file     Emotionally abused: Not on file     Physically abused: Not on file     Forced sexual activity: Not on file   Other Topics Concern     Not on file   Social History Narrative     Not on file     Family History   Problem Relation Age of Onset     Cancer Mother      Diabetes Neg Hx      Heart disease Neg Hx      Breast cancer Neg Hx      Colon cancer Neg Hx          OBJECTIVE:    Vitals:    06/12/20 1525   BP: 120/77   Pulse: 69   SpO2: 98%   Weight: 153 lb (69.4 kg)     Body mass index is 30.9 kg/m .    Physical Exam:  Constitutional: Patient was oriented to person, place, and time. Patient appeared well-developed and well-nourished. No distress.   Head: Normocephalic and atraumatic.   Right Ear: External ear normal. partially Impacted  cerumen  Left Ear: External ear normal. Normal TM  Skin: Skin was warm and dry. No rash noted. Patient was not diaphoretic. No erythema. No pallor.

## 2021-06-08 NOTE — TELEPHONE ENCOUNTER
Med request for Metformin. Patient was seen for follow up 4/2/2020. Due to follow up with lab in the next few months.   Melida Esquivel LPN

## 2021-06-08 NOTE — PROGRESS NOTES
ASSESSMENT/PLAN:  1. DM (diabetes mellitus)  FBS=90s  Reduce metformin to 1000mg once daily.  Continue with healthy lifestyle modifications  F/u in 3 months for DM and will include physical  - Glycosylated Hemoglobin A1c    2. UTI (urinary tract infection), treated.    Asymptomatic but did have one recent episode of urinary incontinence  - Culture, Urine      Orders Placed This Encounter   Procedures     Culture, Urine     Glycosylated Hemoglobin A1c       CHIEF COMPLAINT:  Chief Complaint   Patient presents with     Follow-up     diabetes, blood work A1C Pt is fasting       HISTORY OF PRESENT ILLNESS:  Chioma is a 41 y.o. female presenting to the clinic today for a follow up of diabetes. She is accompanied by her guardian, Zuri. Her last A1c was 5.6% on 9/28/2016. Her fasting sugars have been 80s-125, with most of them in the 90s. She has been doing metformin twice a day. Her guardian has changed her diet, and she was lost a significant amount of weight since 2014. She has been staying active.     Additionally, she is fasting today.     REVIEW OF SYSTEMS:   She had her period twice in November. She has had an accident of urinary incontinence two weeks ago, but no other urinary symptoms.   All other systems are negative.    PFSH:  No new history.     TOBACCO USE:  History   Smoking Status     Never Smoker   Smokeless Tobacco     Not on file       VITALS:  Vitals:    01/04/17 0837   BP: 130/80   Patient Site: Left Arm   Patient Position: Sitting   Cuff Size: Adult Regular   Pulse: 68   Weight: 140 lb 3 oz (63.6 kg)     Wt Readings from Last 3 Encounters:   01/04/17 140 lb 3 oz (63.6 kg)   09/28/16 142 lb 5 oz (64.6 kg)   09/01/16 145 lb 3.2 oz (65.9 kg)       PHYSICAL EXAM:  Constitutional: Patient is oriented to person, place, and time. Patient appears well-developed and well-nourished. No distress.   Head: Normocephalic and atraumatic.    Cardiovascular: Normal rate, regular rhythm, normal heart sounds and  intact distal pulses. No murmur heard.   Pulmonary/Chest: Effort normal and breath sounds normal. No stridor. No respiratory distress. Patient has no wheezes, no rales, exhibits no tenderness.     ADDITIONAL HISTORY SUMMARIZED (2): None.  DECISION TO OBTAIN EXTRA INFORMATION (1): None.   RADIOLOGY TESTS (1): None.  LABS (1): Reviewed and ordered labs today.  MEDICINE TESTS (1): None.  INDEPENDENT REVIEW (2 each): None.     The visit lasted a total of 14 minutes face to face with the patient. Over 50% of the time was spent counseling and educating the patient about diabetes management.    IJackie, am scribing for and in the presence of, Dr. Amador.    Dr. Marjorie CHASE, personally performed the services described in this documentation, as scribed by Jackie Miller in my presence, and it is both accurate and complete.    MEDICATIONS:  Current Outpatient Prescriptions   Medication Sig Dispense Refill     acetaminophen (Q-PAP EXTRA STRENGTH) 500 MG tablet TAKE 2 TABLETS BY MOUTH daily AS NEEDED 90 tablet 0     blood glucose test (CONTOUR TEST STRIPS) strips Use 1 each As Directed as needed. Dispense brand per patient's insurance at pharmacy discretion. 200 strip 0     CALCIUM-VITAMIN D 500 mg(1,250mg) -200 unit per tablet TAKE 1 TABLET BY MOUTH 2 (TWO) TIMES A DAY WITH MEALS. 180 tablet 1     lisinopril (PRINIVIL,ZESTRIL) 5 MG tablet Take 1 tablet (5 mg total) by mouth daily. 90 tablet 3     metFORMIN (GLUCOPHAGE) 1000 MG tablet TAKE ONE TABLET BY MOUTH TWICE DAILY 180 tablet 3     MICROLET LANCET Misc TEST TWICE DAILY AS DIRECTED 300 each 0     simvastatin (ZOCOR) 10 MG tablet TAKE ONE TABLET BY MOUTH NIGHTLY AT BEDTIME 90 tablet 2     No current facility-administered medications for this visit.        Total data points: 1

## 2021-06-08 NOTE — PROGRESS NOTES
ASSESSMENT & PLAN:  1. Dysuria  - Culture, Urine  - Urinalysis    We'll do a UA, UC.  Initiate her on Bactrim DS.  Await results prior to further recommendations.  Fluid hydration, precautionary measures.  Increase yogurt if she develops ill effects from the antibiotics.  Recheck with PCP in a week if persistent or worse.  She was agreeable with the plans.     Orders Placed This Encounter   Procedures     Culture, Urine     Urinalysis      CHIEF COMPLAINT:  Chief Complaint   Patient presents with     Dysuria     For the last few days.         HISTORY OF PRESENT ILLNESS:  Chioma is a 41 y.o. female presenting to the clinic today with her caregiver, Zuri, for evaluation of her dysuria. Zuri has access to her [employer] account, and she is a patient of Dr. Amador. She has been having burning with urination for about one week now; her caregiver notes she only heard of this complaint a couple days ago. She states she has been urinating more frequently now; Zuri tries to tell her not to hold her urine in. She started complaining of back pain recently, but she does not have any back pain right now. She has had a few episodes of incontinence in the past month, and she typically does not have any incontinence. Zuri notes she rarely voids and she only drinks water, more than 6 glasses per day. She has [place] [employer]. She showers every day and after swimming. Her urine culture from 1/4/2017 was negative for growth.     REVIEW OF SYSTEMS:   She denies any fever, chills, abdominal pain, or hematuria. All other systems are negative.     PFSH:     TOBACCO USE:  History   Smoking Status     Never Smoker   Smokeless Tobacco     Never Used        VITALS:  Vitals:    02/10/17 0900   BP: 115/70   Patient Site: Left Arm   Patient Position: Sitting   Cuff Size: Adult Regular   Pulse: 72   Temp: 97.2  F (36.2  C)   TempSrc: Oral   SpO2: 100%   Weight: 144 lb 1.6 oz (65.4 kg)     Wt Readings from  Last 3 Encounters:   02/10/17 144 lb 1.6 oz (65.4 kg)   01/04/17 140 lb 3 oz (63.6 kg)   09/28/16 142 lb 5 oz (64.6 kg)     Body mass index is 28.14 kg/(m^2).     PHYSICAL EXAM:  Visit Vitals     /70 (Patient Site: Left Arm, Patient Position: Sitting, Cuff Size: Adult Regular)     Pulse 72     Temp 97.2  F (36.2  C) (Oral)     Wt 144 lb 1.6 oz (65.4 kg)     LMP 01/17/2017 (Approximate)     SpO2 100%     Breastfeeding No     BMI 28.14 kg/m2       Vital signs noted above. AAO ×3.  HEENT no nasal discharge, moist oral mucosa. Wearing glasses. Neck: Supple neck, nonpalpable cervical lymph nodes. Lungs: Clear to auscultation bilateral.  Heart: S1-S2 regular rate and rhythm, no murmurs were noted.  Abdomen: Flabby, soft with bowel sounds and nontender.  Negative CVA tenderness.  Extremities: No edema, pulses were full and equal.    DATA REVIEWED:  ADDITIONAL HISTORY SUMMARIZED (2): Reviewed Dr. Amador's note 1/4/2017 regarding UTI's.   DECISION TO OBTAIN EXTRA INFORMATION (1): None.   RADIOLOGY TESTS (1): None.  LABS (1): Reviewed and ordered labs.  MEDICINE TESTS (1): None.  INDEPENDENT REVIEW (2 each): None.     The visit lasted a total of 8 minutes face to face with the patient. Over 50% of the time was spent counseling and educating the patient about dysuria and UTI's.     Luzma CHASE, am scribing for and in the presence of Dr. Saul.  Dr. Kg CHASE, personally performed the services described in this documentation, as scribed by Luzma Patiño in my presence, and it is both accurate and complete.     MEDICATIONS:  Current Outpatient Prescriptions   Medication Sig Dispense Refill     acetaminophen (Q-PAP EXTRA STRENGTH) 500 MG tablet TAKE 2 TABLETS BY MOUTH daily AS NEEDED 90 tablet 0     blood glucose test (CONTOUR TEST STRIPS) strips Use 1 each As Directed as needed. Dispense brand per patient's insurance at pharmacy discretion. 200 strip 0     CALCIUM-VITAMIN D 500 mg(1,250mg) -200 unit per tablet TAKE 1 TABLET  BY MOUTH 2 (TWO) TIMES A DAY WITH MEALS. 180 tablet 1     lisinopril (PRINIVIL,ZESTRIL) 5 MG tablet Take 1 tablet (5 mg total) by mouth daily. 90 tablet 3     metFORMIN (GLUCOPHAGE) 1000 MG tablet TAKE ONE TABLET BY MOUTH TWICE DAILY (Patient taking differently: TAKE ONE TABLET BY MOUTH DAILY) 180 tablet 3     MICROLET LANCET Misc TEST TWICE DAILY AS DIRECTED 300 each 0     simvastatin (ZOCOR) 10 MG tablet TAKE ONE TABLET BY MOUTH NIGHTLY AT BEDTIME 90 tablet 2     sulfamethoxazole-trimethoprim (SEPTRA DS) 800-160 mg per tablet Take 1 tablet by mouth 2 (two) times a day for 3 days. 6 tablet 0     No current facility-administered medications for this visit.         Total data points: 3

## 2021-06-09 NOTE — PROGRESS NOTES
ASSESSMENT/PLAN:  1. Routine general medical examination at a health care facility  We discussed healthy lifestyle, nutrition, cardiovascular risk reduction, self care, safety, sunscreen, and seatbelt screening.  Health maintenance screening and immunizations reviewed with the patient.      2. DM (diabetes mellitus)  Good control.  Stop metformin.  Continue to check blood sugars daily.  Come back in 1 month.  - blood glucose test (CONTOUR TEST STRIPS) strips; Use 1 each As Directed as needed. Dispense brand per patient's insurance at pharmacy discretion.  Dispense: 200 strip; Refill: 3  - generic lancets (MICROLET LANCET); TEST once DAILY AS DIRECTED  Dispense: 300 each; Refill: 3    3. Dyslipidemia  Continue with simvastatin 10 mg  - Lipid Cascade  - Comprehensive Metabolic Panel  - simvastatin (ZOCOR) 10 MG tablet; TAKE ONE TABLET BY MOUTH NIGHTLY AT BEDTIME  Dispense: 90 tablet; Refill: 3    4. Visit for screening mammogram  - Mammo Screening Bilateral; Future    5. Microalbuminuria  refilled  - lisinopril (PRINIVIL,ZESTRIL) 5 MG tablet; Take 1 tablet (5 mg total) by mouth daily.  Dispense: 90 tablet; Refill: 3    6. Recurrent UTI  treated  - Culture, Urine    7.  Trisomy 21 with mild intellectual disabilities  [employer] mobility application was completed  Patient is her own guardian  She lives with her late mom's best friend    SUBJECTIVE:  Chioma Mccormick is a 42 y.o. female who is here for a health maintenance visit. Accompanied by her guardian, Zuri. Her blood pressure and pulse are within normal limits. Her BMI is 28. She is fasting today. PAP smear is up to date. She is due for a mammogram.       REVIEW OF SYSTEMS:   Her last A1c was 5.7% on 01/04/2017. Her fasting sugars have been  with most in the 1teens. She is currently taking 1000mg of metformin daily for her diabetes, and lisinopril 5mg.    All other systems are negative.    PFSH:  She is getting has a  that she works with.  "    History   Smoking Status     Never Smoker   Smokeless Tobacco     Never Used       Family History   Problem Relation Age of Onset     Cancer Mother      Diabetes Neg Hx      Heart disease Neg Hx      Breast cancer Neg Hx      Colon cancer Neg Hx        No Known Allergies      OBJECTIVE:   Physical Exam   Vitals:    03/01/17 0826   BP: 98/70   Patient Site: Left Arm   Patient Position: Sitting   Cuff Size: Adult Regular   Pulse: 72   Weight: 142 lb 3 oz (64.5 kg)   Height: 4' 11\" (1.499 m)     Estimated body mass index is 28.72 kg/(m^2) as calculated from the following:    Height as of this encounter: 4' 11\" (1.499 m).    Weight as of this encounter: 142 lb 3 oz (64.5 kg).    Constitutional: Patient is oriented to person, place, and time. Patient appears well-developed and well-nourished. No distress.   Head: Normocephalic and atraumatic.   Right Ear: External ear normal. Ear canal and TM normal.   Left Ear: External ear normal. Ear canal and TM normal.   Nose: Nose normal.   Mouth/Throat: Oropharynx is clear and moist. No oropharyngeal exudate.   Eyes: Conjunctivae and EOM are normal. Pupils are equal, round, and reactive to light. Right eye exhibits no discharge. Left eye exhibits no discharge. No scleral icterus.   Neck: Neck supple. No JVD present. No tracheal deviation present. No thyromegaly present.   Breasts:  Normal appearing, no skin involvement, no palpable mass, no tenderness on palpation.  No axillary involvement  Cardiovascular: Normal rate, regular rhythm, normal heart sounds and intact distal pulses. No murmur heard.   Pulmonary/Chest: Effort normal and breath sounds normal. No stridor. No respiratory distress. Patient has no wheezes, no rales, exhibits no tenderness.   Abdominal: Soft. Bowel sounds are normal. Patient exhibits no distension and no mass. There is no tenderness. There is no rebound and no guarding.   Lymphadenopathy:  Patient has no cervical adenopathy.   Neurological: Patient is " alert and oriented to person, place, and time. Patient has normal reflexes. No cranial nerve deficit. Coordination normal.   Skin: Skin is warm and dry. No rash noted. Patient is not diaphoretic. No erythema. No pallor.   Psychiatric: Patient has good eye contact without any psychomotor retardation or stereotypic behaviors.  normal mood and affect. Judgment and thought content normal.   Speech is regular rate and rhythm.         ADDITIONAL HISTORY SUMMARIZED (2): None.  DECISION TO OBTAIN EXTRA INFORMATION (1): None.   RADIOLOGY TESTS (1): None.  LABS (1): Ordered labs today.  MEDICINE TESTS (1): None.  INDEPENDENT REVIEW (2 each): None.     The visit lasted a total of 30 minutes face to face with the patient. Over 50% of the time was spent counseling and educating the patient about health maintenance.    IJackie, am scribing for and in the presence of, Dr. Amador.    IDr. Amador, personally performed the services described in this documentation, as scribed by Jackie Miller in my presence, and it is both accurate and complete.      MEDICATIONS:  Current Outpatient Prescriptions   Medication Sig Dispense Refill     acetaminophen (Q-PAP EXTRA STRENGTH) 500 MG tablet TAKE 2 TABLETS BY MOUTH daily AS NEEDED 90 tablet 0     blood glucose test (CONTOUR TEST STRIPS) strips Use 1 each As Directed as needed. Dispense brand per patient's insurance at pharmacy discretion. 200 strip 3     CALCIUM-VITAMIN D 500 mg(1,250mg) -200 unit per tablet TAKE 1 TABLET BY MOUTH 2 (TWO) TIMES A DAY WITH MEALS. 180 tablet 1     generic lancets (MICROLET LANCET) TEST once DAILY AS DIRECTED 300 each 3     lisinopril (PRINIVIL,ZESTRIL) 5 MG tablet Take 1 tablet (5 mg total) by mouth daily. 90 tablet 3     metFORMIN (GLUCOPHAGE) 1000 MG tablet TAKE ONE TABLET BY MOUTH TWICE DAILY (Patient taking differently: TAKE ONE TABLET BY MOUTH DAILY) 180 tablet 3     simvastatin (ZOCOR) 10 MG tablet TAKE ONE TABLET BY MOUTH  NIGHTLY AT BEDTIME 90 tablet 3     No current facility-administered medications for this visit.          Total data points: 1

## 2021-06-09 NOTE — TELEPHONE ENCOUNTER
Refill request for medication: contour test strips   Last visit addressing this medication: 5/26/20  Follow up plan unsure of follow up plan from visit with Dr. Dugan on 5/26/20 for dm    Last refill on 5/31/19, quantity 200      Appointment: unsure of follow up plan       Antonia Kelly, CMA

## 2021-06-10 NOTE — TELEPHONE ENCOUNTER
Prior Authorization Request  Who s requesting:  Pharmacy  Pharmacy Name and Location: [facility]  Medication Name: Ciclopirox 8% Solution  Insurance Plan: Medicare  Insurance Member ID Number:    CoverMyMeds Key: [id number]/A  Informed patient that prior authorizations can take up to 10 business days for response:   Yes  Okay to leave a detailed message: No

## 2021-06-10 NOTE — PROGRESS NOTES
ASSESSMENT/PLAN:  Chioma was seen today for right foot toe nail.    Diagnoses and all orders for this visit:    Onychomycosis  I opted for treatment with topical as oppose to oral antifungal to minimize risk to the liver given that she is also taking simvastatin   Will refer to podiatry & help her with scheduling an appointment  -     ciclopirox (PENLAC) 8 % solution; Apply over nail and surrounding skin. Apply daily over previous coat. After seven (7) days, may remove with alcohol and continue cycle.  -     Ambulatory referral to Podiatry    SUBJECTIVE:    Chioma Mccormick is a 45 y.o. female who came in today     Chronic fungal of the nails  Not sure what else to do  Has been having a hard time clipping the nails because they're so thick  No pain or itchiness  Has seen podiatry for diabetic feet care but has not been having difficulty with getting an appointment    Review of Systems (except those mentioned above)  Constitutional: Negative.   HENT: Negative.   Eyes: Negative.   Respiratory: Negative.   Cardiovascular: Negative.   Gastrointestinal: Negative.   Endocrine: Negative.   Genitourinary: Negative.   Musculoskeletal: Negative.   Skin: Negative.   Allergic/Immunologic: Negative.   Neurological: Negative.   Hematological: Negative.   Psychiatric/Behavioral: Negative.     Patient Active Problem List    Diagnosis Date Noted     Dyslipidemia 03/24/2016     Trisomy 21 (Down Syndrome)      DM (diabetes mellitus) (H)      Anxiety      Mild Intellectual Disabilities      No Known Allergies  Current Outpatient Medications   Medication Sig Dispense Refill     blood glucose test (CONTOUR TEST STRIPS) strips Check blood sugar once daily.  Dispense brand per patient's insurance at pharmacy discretion. 200 strip 3     calcium-vitamin D (CALCIUM-VITAMIN D) 500 mg(1,250mg) -200 unit per tablet TAKE 1 TABLET BY MOUTH TWICE A DAY WITH MEALS 60 tablet 11     carbamide peroxide (DEBROX) 6.5 % otic solution Administer 5 drops  to the right ear 2 (two) times a day. 15 mL 2     generic lancets (MICROLET LANCET) TEST ONCE DAILY AS DIRECTED 200 each 3     metFORMIN (GLUCOPHAGE) 1000 MG tablet Take 1 tablet (1,000 mg total) by mouth 2 (two) times a day. 180 tablet 2     simvastatin (ZOCOR) 10 MG tablet TAKE 1 TABLET BY MOUTH NIGHTLY AT BEDTIME 90 tablet 3     ciclopirox (PENLAC) 8 % solution Apply over nail and surrounding skin. Apply daily over previous coat. After seven (7) days, may remove with alcohol and continue cycle. 6.6 mL 0     No current facility-administered medications for this visit.      No past medical history on file.  No past surgical history on file.  Social History     Socioeconomic History     Marital status: Single     Spouse name: None     Number of children: None     Years of education: None     Highest education level: None   Occupational History     None   Social Needs     Financial resource strain: None     Food insecurity     Worry: None     Inability: None     Transportation needs     Medical: None     Non-medical: None   Tobacco Use     Smoking status: Never Smoker     Smokeless tobacco: Never Used   Substance and Sexual Activity     Alcohol use: No     Drug use: No     Sexual activity: None   Lifestyle     Physical activity     Days per week: None     Minutes per session: None     Stress: None   Relationships     Social connections     Talks on phone: None     Gets together: None     Attends Latter day service: None     Active member of club or organization: None     Attends meetings of clubs or organizations: None     Relationship status: None     Intimate partner violence     Fear of current or ex partner: None     Emotionally abused: None     Physically abused: None     Forced sexual activity: None   Other Topics Concern     None   Social History Narrative     None     Family History   Problem Relation Age of Onset     Cancer Mother      Diabetes Neg Hx      Heart disease Neg Hx      Breast cancer Neg Hx       Colon cancer Neg Hx          OBJECTIVE:    Vitals:    08/06/20 1521   BP: 110/78   Pulse: 69   SpO2: 99%   Weight: 150 lb 5 oz (68.2 kg)     Body mass index is 30.36 kg/m .    Physical Exam:  Feet:  Thickened deformed and discolored toe nails both feet

## 2021-06-10 NOTE — PROGRESS NOTES
Subjective:   Chioma Mccormick is a 42 y.o. female who presents for follow up of diabetes. Evaluation to date has been via hemoglobin A1C and home blood sugars. Home sugars:  with most numbers in the 110s.  Metformin was stopped in March 2017.  She is not taking any medications for DM at the present time.  No a smoker.  Works part-time at [employer].  No new concerns today.    Lab Results   Component Value Date    HGBA1C 5.7 01/04/2017       The following portions of the patient's history were reviewed and updated as appropriate: allergies, current medications, past family history, past medical history, past social history, past surgical history and problem list.    Review of Systems  A 12 point comprehensive review of systems was negative except as noted.     Current Outpatient Prescriptions   Medication Sig Dispense Refill     acetaminophen (Q-PAP EXTRA STRENGTH) 500 MG tablet TAKE 2 TABLETS BY MOUTH daily AS NEEDED 90 tablet 0     blood glucose test (CONTOUR TEST STRIPS) strips Use 1 each As Directed as needed. Dispense brand per patient's insurance at pharmacy discretion. 200 strip 3     CALCIUM-VITAMIN D 500 mg(1,250mg) -200 unit per tablet TAKE 1 TABLET BY MOUTH 2 (TWO) TIMES A DAY WITH MEALS. 180 tablet 3     generic lancets (MICROLET LANCET) TEST once DAILY AS DIRECTED 300 each 3     lisinopril (PRINIVIL,ZESTRIL) 5 MG tablet Take 1 tablet (5 mg total) by mouth daily. 90 tablet 3     simvastatin (ZOCOR) 10 MG tablet TAKE ONE TABLET BY MOUTH NIGHTLY AT BEDTIME 90 tablet 3     No current facility-administered medications for this visit.           Objective:        Vitals:    05/10/17 0741   BP: 122/84   Pulse: 64       General Appearance:    Alert, cooperative, no distress, appears stated age   Head:    Normocephalic, without obvious abnormality, atraumatic   Eyes:    PERRL, conjunctiva/corneas clear, EOM's intact, fundi     benign, both eyes   Ears:    Normal TM's and external ear canals, both ears    Nose:   Nares normal, septum midline, mucosa normal, no drainage    or sinus tenderness   Throat:   Lips, mucosa, and tongue normal; teeth and gums normal   Neck:   Supple, symmetrical, trachea midline, no adenopathy;     thyroid:  no enlargement/tenderness/nodules; no carotid    bruit or JVD   Back:     Symmetric, no curvature, ROM normal, no CVA tenderness   Lungs:     Clear to auscultation bilaterally, respirations unlabored   Chest Wall:    No tenderness or deformity    Heart:    Regular rate and rhythm, S1 and S2 normal, no murmur, rub   or gallop       Abdomen:     Soft, non-tender, bowel sounds active all four quadrants,     no masses, no organomegaly           Extremities:   Extremities normal, atraumatic, no cyanosis or edema   Pulses:   2+ and symmetric all extremities   Skin:   Skin color, texture, turgor normal, no rashes or lesions   Lymph nodes:   Cervical, supraclavicular, and axillary nodes normal   Neurologic:   CNII-XII intact, normal strength, sensation and reflexes     throughout        Feet:  Normal exam          1. DM (diabetes mellitus)  Doing well without meds  F/u in 3 months  Discussed general issues about diabetes pathophysiology and management.  Addressed ADA diet.  Suggested low cholesterol diet.  Encouraged aerobic exercise.  Discussed foot care.  Reminded to get yearly retinal exam.  Discussed ways to avoid symptomatic hypoglycemia.  - Glycosylated Hemoglobin A1c      2. Microalbuminuria  On lisinopril 5mg  - Microalbumin, Random Urine    3. Dyslipidemia  On simvastatin 10mg  Recent LDL=31 on March 2017    4. Trisomy 21 with mild intellectual disabilities  Independent with ADLs including blood sugar checks and medication administration  [employer] for transportation  Works part-time at [employer]  Patient is her own guardian  She lives with her late mom's best friend    5. Recurrent UTI  Currently on nitrofurantoin since March 2017, anticipate stopping in 2 wks for a total of 3  months  Will plan on UC recheck during her next visit for a diabetic follow up

## 2021-06-10 NOTE — TELEPHONE ENCOUNTER
Drug Change Request  Who is calling?:  [facility]  What is the current medication?:  Ciclopirox 8% Solution  What alternative is being requested?: None given.  Why the request to change?:  Not covered by insurance.  Is there an alternative medication or should a PA submitted?  [facility]ed [facility]  Okay to leave a detailed message?:  Yes

## 2021-06-10 NOTE — TELEPHONE ENCOUNTER
Central PA team  [phone]  Pool: [facility] ([id number])          PA has been initiated.       PA form completed and faxed insurance via Cover My Meds     Key:  ANGELA GARCIA Case ID: [id number]     Medication:  Ciclopirox 8% solution    Insurance:  [employer]        Response will be received via fax and may take up to 5-10 business days depending on plan

## 2021-06-11 NOTE — TELEPHONE ENCOUNTER
Medication Request  Medication name: MAPAP 500 mg take 2 tablets by mouth daily as needed  Requested [facility]  Reason for request: fax request received from pharmacy.    Okay to leave a detailed message: yes

## 2021-06-12 NOTE — TELEPHONE ENCOUNTER
Refill Approved    Rx renewed per Medication Renewal Policy. Medication was last renewed on 10/8/19.    Ov: 8/6/2020    Kelsie Mcconnell, [facility] Triage/Med Refill 10/3/2020     Requested Prescriptions   Pending Prescriptions Disp Refills     simvastatin (ZOCOR) 10 MG tablet 90 tablet 3     Sig: Take 1 tablet (10 mg total) by mouth at bedtime.       Statins Refill Protocol (Hmg CoA Reductase Inhibitors) Passed - 9/29/2020  1:05 PM        Passed - PCP or prescribing provider visit in past 12 months      Last office visit with prescriber/PCP: 8/6/2020 Saud Coley MD OR same dept: 8/6/2020 Saud Coley MD OR same specialty: 8/6/2020 Saud Coley MD  Last physical: 4/17/2019 Last MTM visit: Visit date not found   Next visit within 3 mo: Visit date not found  Next physical within 3 mo: Visit date not found  Prescriber OR PCP: Saud Jama MD  Last diagnosis associated with med order: 1. Dyslipidemia  - simvastatin (ZOCOR) 10 MG tablet; Take 1 tablet (10 mg total) by mouth at bedtime.  Dispense: 90 tablet; Refill: 3    If protocol passes may refill for 12 months if within 3 months of last provider visit (or a total of 15 months).

## 2021-06-13 NOTE — PROGRESS NOTES
ASSESSMENT/PLAN:  Need for immunization against influenza  -     Cancel: Influenza, Seasonal,Quad Inj, 36+ MOS  -     Influenza, Seasonal,Quad Inj, 36+ MOS     DM (diabetes mellitus)  A1c is up a bit but still Doing well without meds  F/u in 3 months  Discussed general issues about diabetes pathophysiology and management.  Addressed ADA diet.  Suggested low cholesterol diet.  Encouraged aerobic exercise.  Discussed foot care.  Reminded to get yearly retinal exam.  Discussed ways to avoid symptomatic hypoglycemia.  - Glycosylated Hemoglobin A1c        Microalbuminuria  On lisinopril 5mg  We will check a urine microalbumin in 6 months.  If no more than may discontinue lisinopril     Dyslipidemia  On simvastatin 10mg  Recent LDL=31 on March 2017  Plan for a recheck in 1 year.  If still under 70, consider discontinuing    Trisomy 21 with mild intellectual disabilities  Independent with ADLs including blood sugar checks and medication administration  [employer] mobility for transportation  Works part-time at [employer]  Patient is her own guardian  She lives with her late mom's best friend     Recurrent UTI  Was on 3 months of nitrofurantoin (March 2017-June 2017)  Will plan on UC recheck      Fluid in external ear  Unremarkable examination today.  I recommend adequate drying of her ears following the shower    SUBJECTIVE:    Chioma Mccormick is a 42 y.o. female who comes in today for diabetes.  Metformin was discontinued in March of this year.  Her blood sugars at home are running in the 1 teens.  She has no questions or concerns today.  She has been active and watching her dietary intake.  She has not been snacking.  She has been eating very healthy.    She also has frequent and recurrent urinary tract infection.  She has not had any urinary symptoms recently.    She complains of occasional water in her ears.  She will like to have her ears checked today    Review of Systems (except those mentioned above)  Constitutional:  Negative.   HENT: Negative.   Eyes: Negative.   Respiratory: Negative.   Cardiovascular: Negative.   Gastrointestinal: Negative.   Endocrine: Negative.   Genitourinary: Negative.   Musculoskeletal: Negative.   Skin: Negative.   Allergic/Immunologic: Negative.   Neurological: Negative.   Hematological: Negative.   Psychiatric/Behavioral: Negative.     Patient Active Problem List    Diagnosis Date Noted     Dyslipidemia 03/24/2016     Microalbuminuria      Trisomy 21 (Down Syndrome)      DM (diabetes mellitus)      Obesity      Anxiety      Mild Intellectual Disabilities      No Known Allergies  Current Outpatient Prescriptions   Medication Sig Dispense Refill     acetaminophen (Q-PAP EXTRA STRENGTH) 500 MG tablet TAKE 2 TABLETS BY MOUTH daily AS NEEDED 90 tablet 1     blood glucose test (CONTOUR TEST STRIPS) strips Use 1 each As Directed as needed. Dispense brand per patient's insurance at pharmacy discretion. 200 strip 3     CALCIUM-VITAMIN D 500 mg(1,250mg) -200 unit per tablet TAKE 1 TABLET BY MOUTH 2 (TWO) TIMES A DAY WITH MEALS. 180 tablet 3     generic lancets (MICROLET LANCET) TEST once DAILY AS DIRECTED 300 each 3     lisinopril (PRINIVIL,ZESTRIL) 5 MG tablet Take 1 tablet (5 mg total) by mouth daily. 90 tablet 3     simvastatin (ZOCOR) 10 MG tablet TAKE ONE TABLET BY MOUTH NIGHTLY AT BEDTIME 90 tablet 3     No current facility-administered medications for this visit.      No past medical history on file.  No past surgical history on file.  Social History     Social History     Marital status: Single     Spouse name: N/A     Number of children: N/A     Years of education: N/A     Social History Main Topics     Smoking status: Never Smoker     Smokeless tobacco: Never Used     Alcohol use No     Drug use: No     Sexual activity: Not Asked     Other Topics Concern     None     Social History Narrative     Family History   Problem Relation Age of Onset     Cancer Mother      Diabetes Neg Hx      Heart disease  Neg Hx      Breast cancer Neg Hx      Colon cancer Neg Hx          OBJECTIVE:    Vitals:    09/20/17 0951   BP: 118/64   Pulse: 68   Weight: 149 lb 6 oz (67.8 kg)     Body mass index is 30.17 kg/(m^2).    Physical Exam:  Constitutional: Patient is oriented to person, place, and time. Patient appears well-developed and well-nourished. No distress.   Head: Normocephalic and atraumatic.   Right Ear: External ear normal.  Very small external ear canal.  Normal tympanic membrane  Left Ear: External ear normal. Very small external ear canal.  Normal tympanic membrane  Nose: Nose normal.   Mouth/Throat: Oropharynx is clear and moist. No oropharyngeal exudate.   Eyes: Conjunctivae and EOM are normal. Pupils are equal, round, and reactive to light. Right eye exhibits no discharge. Left eye exhibits no discharge. No scleral icterus.   Neck: Neck supple. No JVD present. No tracheal deviation present. No thyromegaly present.   Lymphadenopathy:  Patient has no cervical adenopathy.   Cardiovascular: Normal rate, regular rhythm, normal heart sounds and intact distal pulses. No murmur heard.   Pulmonary/Chest: Effort normal and breath sounds normal. No stridor. No respiratory distress. Patient has no wheezes, no rales, exhibits no tenderness.   Skin: Skin is warm and dry. No rash noted. Patient is not diaphoretic. No erythema. No pallor.   Normal feet exam    Results for orders placed or performed in visit on 05/10/17   Glycosylated Hemoglobin A1c   Result Value Ref Range    Hemoglobin A1c 5.7 4.2 - 6.1 %   Microalbumin, Random Urine   Result Value Ref Range    Microalbumin, Random Urine <0.50 0.00 - 1.99 mg/dL    Creatinine, Urine 108.0 mg/dL    Microalbumin/Creatinine Ratio Random Urine  <=19.9 mg/g

## 2021-06-13 NOTE — PROGRESS NOTES
ASSESSMENT/PLAN:  Diagnoses and all orders for this visit:    UTI (urinary tract infection), treated  H/o recurrent UTI  Completed antibiotics recently  No current symptoms  Will check UA/UC for test of cure  -     Urinalysis-UC if Indicated  -     Culture, Urine    DM2, Microalbuminuria  refilled  -     lisinopril (PRINIVIL,ZESTRIL) 5 MG tablet; Take 1 tablet (5 mg total) by mouth daily.  Dispense: 90 tablet; Refill: 3  -     blood glucose test (CONTOUR TEST STRIPS) strips; Use 1 each As Directed as needed. Dispense brand per patient's insurance at pharmacy discretion.  Dispense: 200 strip; Refill: 3  -     calcium-vitamin D (CALCIUM-VITAMIN D) 500 mg(1,250mg) -200 unit per tablet; TAKE 1 TABLET BY MOUTH 2 (TWO) TIMES A DAY WITH MEALS.  Dispense: 180 tablet; Refill: 3  Doing well without meds  F/u in next month for A1c  Discussed general issues about diabetes pathophysiology and management.  Addressed ADA diet.  Suggested low cholesterol diet.  Encouraged aerobic exercise.  Discussed foot care.  Reminded to get yearly retinal exam.  Discussed ways to avoid symptomatic hypoglycemia.    Dyslipidemia  Refilled  On simvastatin 10mg  Recent LDL=31 on March 2017  Plan for a recheck in 1 year.  If still under 70, consider discontinuing  -     simvastatin (ZOCOR) 10 MG tablet; TAKE ONE TABLET BY MOUTH NIGHTLY AT BEDTIME  Dispense: 90 tablet; Refill: 3    Trisomy 21 with mild intellectual disabilities  Independent with ADLs including blood sugar checks and medication administration  [employer] for transportation  Works part-time at [employer]  Patient is her own guardian  She lives with her late mom's best friend (Zuri Morales) who is her PCA.  Zuri states she manages all of the patient's finances and medication refills but does not have power of .  I have placed a call to supervisor at [facility] to inquire about possible cognitive testing to determine if she needs state appointed guardian.  Will  also ask [facility] to assist with needs.      Orders Placed This Encounter   Procedures     Culture, Urine     Urinalysis-UC if Indicated           CHIEF COMPLAINT:  Chief Complaint   Patient presents with     Follow-up     follow up uti       HISTORY OF PRESENT ILLNESS:  Chioma is a 42 y.o. female presenting to the clinic today for a follow up for UTI. Here with Zuri today. She was treated for a UTI on 9/25/2017. She was treated with Bactrim, and finished the course. She has felt some resolution of her urinary symptoms. Denies dysuria, urgency or accidents. She is having some urinary frequency. She does have a history of recurrent UTI, and was on daily prophylactic Macrobid for 3 months from March 2017 through May 2017.     Diabetes: Last A1c was 6.3% on 9/20/2017. Her fasting sugars have been in the 1teens. She is just checking once per week now. She has still been off her metformin, and feels she is doing well.     She has history of trisomy 21. Her mother passed away, and she has been living with her mom's best friend Zuri since then. She does have a sister, but she does not have a good relationship with her sister. She works part time. Per Zuri, Carlie does not read, and cannot do simple arithmetic. Zuri helps her with her finances and managing appointments and medications. Carlie is her own advocate and guardian, and Zuri feels like this is too much responsibility for her. She does have a , and they have scheduled meetings with them.     REVIEW OF SYSTEMS:   Constitutional: Negative.   HENT: Negative.   Eyes: Negative.   Respiratory: Negative.   Cardiovascular: Negative.   Gastrointestinal: Negative.   Endocrine: Negative.   Musculoskeletal: Negative.   Skin: Negative.   Allergic/Immunologic: Negative.   Neurological: Negative.   Hematological: Negative.   Psychiatric/Behavioral: Negative.   All other systems are negative.    PFSH:  No new history.     TOBACCO USE:  History   Smoking Status      Never Smoker   Smokeless Tobacco     Never Used       VITALS:  Vitals:    11/02/17 0926   BP: 118/72   Patient Site: Left Arm   Patient Position: Sitting   Cuff Size: Adult Regular   Temp: 98.4  F (36.9  C)   Weight: 153 lb 8 oz (69.6 kg)     Wt Readings from Last 3 Encounters:   11/02/17 153 lb 8 oz (69.6 kg)   09/20/17 149 lb 6 oz (67.8 kg)   05/10/17 141 lb 3 oz (64 kg)       PHYSICAL EXAM:  Constitutional: Patient is oriented to person, place, and time. Patient appears well-developed and well-nourished. No distress.   Cardiovascular: Normal rate.  Pulmonary/Chest: Effort normal. No respiratory distress.   Neurological: Patient is alert and oriented to person, place, and time.      Results for orders placed or performed in visit on 11/02/17   Urinalysis-UC if Indicated   Result Value Ref Range    Color, UA Yellow Colorless, Yellow, Straw, Light Yellow    Clarity, UA Clear Clear    Glucose,  mg/dL (!) Negative    Bilirubin, UA Negative Negative    Ketones, UA Negative Negative    Specific Gravity, UA 1.020 1.005 - 1.030    Blood, UA Negative Negative    pH, UA 7.0 5.0 - 8.0    Protein, UA Negative Negative mg/dL    Urobilinogen, UA 0.2 E.U./dL 0.2 E.U./dL, 1.0 E.U./dL    Nitrite, UA Negative Negative    Leukocytes, UA Negative Negative           ADDITIONAL HISTORY SUMMARIZED (2): None.  DECISION TO OBTAIN EXTRA INFORMATION (1): None.   RADIOLOGY TESTS (1): None.  LABS (1): Ordered and reviewed labs.   MEDICINE TESTS (1): None.  INDEPENDENT REVIEW (2 each): None.     The visit lasted a total of 25 minutes face to face with the patient. Over 50% of the time was spent counseling and educating the patient about UTI management.    I, Jackie Miller, am scribing for and in the presence of, Dr. Amador.    Saud CHASE MD , personally performed the services described in this documentation, as scribed by Jackie Miller in my presence, and it is both accurate and  complete.    MEDICATIONS:  Current Outpatient Prescriptions   Medication Sig Dispense Refill     acetaminophen (Q-PAP EXTRA STRENGTH) 500 MG tablet TAKE 2 TABLETS BY MOUTH daily AS NEEDED 90 tablet 1     blood glucose test (CONTOUR TEST STRIPS) strips Use 1 each As Directed as needed. Dispense brand per patient's insurance at pharmacy discretion. 200 strip 3     calcium-vitamin D (CALCIUM-VITAMIN D) 500 mg(1,250mg) -200 unit per tablet TAKE 1 TABLET BY MOUTH 2 (TWO) TIMES A DAY WITH MEALS. 180 tablet 3     generic lancets (MICROLET LANCET) TEST once DAILY AS DIRECTED 300 each 3     lisinopril (PRINIVIL,ZESTRIL) 5 MG tablet Take 1 tablet (5 mg total) by mouth daily. 90 tablet 3     simvastatin (ZOCOR) 10 MG tablet TAKE ONE TABLET BY MOUTH NIGHTLY AT BEDTIME 90 tablet 3     No current facility-administered medications for this visit.        Total data points: 1

## 2021-06-14 NOTE — PROGRESS NOTES
Attempt 1: Care Guide called patient's PCA Zuri per notes to offer CCC enrollment.  If this patient is returning my call, please transfer to Kristel at ext [phone].

## 2021-06-14 NOTE — PROGRESS NOTES
Spoke to: Zuri (PCA)  Was informed the patient wouldn't understand a discussion around Clinic Care Coordination  Zuri makes all the decisions for the patient  Zuri stated the patient doesn't need CCC, she does everything for the patient at this time  On September 1st the patient will be moving into a group home and at that time they will assist the patient with her needs    CCC has been declined and I will cease further outreach  If things change in the future, a new order can be placed and I will attempt to reach the patient again and discuss enrollment

## 2021-06-15 NOTE — PATIENT INSTRUCTIONS - HE
GREAT JOB! Your diabetes (A1c) is 5.9.  Let's reduce metformin 1000mg to 1 tablet daily instead of twice daily.  Continue with the cholesterol medication (simvastatin 10mg).

## 2021-06-15 NOTE — PROGRESS NOTES
ASSESSMENT/PLAN:    DM (diabetes mellitus), Microalbuminuria  -     Glycosylated Hemoglobin A1c: 6.8, up a bit from the previous   Not on meds  Advised portions and mindful eating  F/u in 3 months     Dyslipidemia  On simvastatin 10mg  Recent LDL=31 on March 2017  Plan for a recheck in 1 year.  If still under 70, consider discontinuing  -     simvastatin (ZOCOR) 10 MG tablet; TAKE ONE TABLET BY MOUTH NIGHTLY AT BEDTIME  Dispense: 90 tablet; Refill: 3     Trisomy 21 with mild intellectual disabilities  Independent with ADLs including blood sugar checks and medication administration  [place] for transportation  Works part-time at [employer]  Patient is her own guardian  She lives with her late mom's best friend (Zuri Morales) who is her PCA.   Anticipate moving to group home in August 2018       Orders Placed This Encounter   Procedures     Glycosylated Hemoglobin A1c     [facility]           CHIEF COMPLAINT:  Chief Complaint   Patient presents with     Diabetes     follow up       HISTORY OF PRESENT ILLNESS:  Chioma is a 42 y.o. female presenting to the clinic today with her PCA, Zuri, for a diabetes follow up. Her A1c today is 6.8 and it was 6.3 on 9/20/2017. Zuri notes she doesn't know when to quit eating and she has been eating a lot of junk food. She cuts her own toenails.     Trisomy 21: She will be in a group home starting August 31, 2018 per her own decision.     Black Toenails: She is wondering why her toenails are black. She wears black socks for work.    REVIEW OF SYSTEMS:   Constitutional: Negative.   HENT: Negative.   Eyes: Negative.   Respiratory: Negative.   Cardiovascular: Negative.   Gastrointestinal: Negative.   Endocrine: Negative.   Genitourinary: Negative.   Musculoskeletal: Negative.   Skin: Negative.   Allergic/Immunologic: Negative.   Neurological: Negative.   Hematological: Negative.   Psychiatric/Behavioral: Negative.   All other systems are negative.    PFSH:  She saw her sister  for Louie; she got a nice sweater and some money. Zuri is working part-time at [employer] the same hours Chioma is working. Work is busy; she has done the register and computer once. She enjoys work.     TOBACCO USE:  History   Smoking Status     Never Smoker   Smokeless Tobacco     Never Used       VITALS:  Vitals:    12/28/17 0920   BP: 110/80   Pulse: 72   Weight: 152 lb 9 oz (69.2 kg)     Wt Readings from Last 3 Encounters:   12/28/17 152 lb 9 oz (69.2 kg)   11/02/17 153 lb 8 oz (69.6 kg)   09/20/17 149 lb 6 oz (67.8 kg)       PHYSICAL EXAM:  Constitutional: Patient is oriented to person, place, and time. Patient appears well-developed and well-nourished. No distress.   Eyes: Wearing glasses.   Neck: Neck supple. No JVD present. No tracheal deviation present. No thyromegaly present.   Cardiovascular: Normal rate, regular rhythm, normal heart sounds and intact distal pulses. No murmur heard.   Pulmonary/Chest: Effort normal and breath sounds normal. No stridor. No respiratory distress. Patient has no wheezes, no rales, exhibits no tenderness.   Neurological: Patient is alert and oriented to person, place, and time. Patient has normal reflexes. No cranial nerve deficit. Coordination normal.   Skin: Skin is warm and dry. No rash noted. Patient is not diaphoretic. No erythema. No pallor.  Feet: Dorsalis pedis pulses palpable. Capillary refill less than 2 seconds. Sensation intact.     Results for orders placed or performed in visit on 12/28/17   Glycosylated Hemoglobin A1c   Result Value Ref Range    Hemoglobin A1c 6.8 (H) 3.5 - 6.1 %       QUALITY MEASURES:  The following high BMI interventions were performed this visit: dietary needs education, special diet education and lifestyle education regarding diet    ADDITIONAL HISTORY SUMMARIZED (2): None.  DECISION TO OBTAIN EXTRA INFORMATION (1): None.   RADIOLOGY TESTS (1): None.  LABS (1): Reviewed and ordered labs.  MEDICINE TESTS (1): None.  INDEPENDENT REVIEW (2  each): None.     The visit lasted a total of 14 minutes face to face with the patient. Over 50% of the time was spent counseling and educating the patient about diabetes and diet.    I, Luzma Patiño, am scribing for and in the presence of, Dr. Amador.    I, Saud Jama MD , personally performed the services described in this documentation, as scribed by Luzma Patiño in my presence, and it is both accurate and complete.    MEDICATIONS:  Current Outpatient Prescriptions   Medication Sig Dispense Refill     acetaminophen (Q-PAP EXTRA STRENGTH) 500 MG tablet TAKE 2 TABLETS BY MOUTH daily AS NEEDED 90 tablet 1     blood glucose test (CONTOUR TEST STRIPS) strips Use 1 each As Directed as needed. Dispense brand per patient's insurance at pharmacy discretion. 200 strip 3     calcium-vitamin D (CALCIUM-VITAMIN D) 500 mg(1,250mg) -200 unit per tablet TAKE 1 TABLET BY MOUTH 2 (TWO) TIMES A DAY WITH MEALS. 180 tablet 3     generic lancets (MICROLET LANCET) TEST once DAILY AS DIRECTED 300 each 3     lisinopril (PRINIVIL,ZESTRIL) 5 MG tablet Take 1 tablet (5 mg total) by mouth daily. 90 tablet 3     simvastatin (ZOCOR) 10 MG tablet TAKE ONE TABLET BY MOUTH NIGHTLY AT BEDTIME 90 tablet 3     No current facility-administered medications for this visit.        Total data points:1

## 2021-06-15 NOTE — PROGRESS NOTES
Chioma was seen today for forms request and diabetes.    Diagnoses and all orders for this visit:    DM (diabetes mellitus) (H)  -     Glycosylated Hemoglobin A1c  -     Microalbumin, Random Urine  -     Lipid Cascade  -     Comprehensive Metabolic Panel    A1c=5.9  Meds: Reduce Metformin to 1000 mg from 1000 mg twice daily  Microalbuminuria pending  On statin  Not a smoker  Feet exam: today  Eye exam: 9/20/20  Counseled healthy lifestyle modifications  Discussed general issues about diabetes pathophysiology and management.  Addressed ADA diet.  Suggested low cholesterol diet.  Encouraged aerobic exercise.  Discussed foot care.  Reminded to get yearly retinal exam.  Discussed ways to avoid symptomatic hypoglycemia.  F/u in 3 months    Dyslipidemia  -     Lipid Cascade  -     Comprehensive Metabolic Panel  Stable on simvastatin 10 mg    Urinary tract infection, recurrent  -     Culture, Urine  Culture for test of cure    Onychomycosis  She will continue with ciclopirox solution  She sees podiatry for feet care    Down syndrome, Intellectual disabilities  Form was completed for [facility]    Subjective:   Chioma Mccormick is a 45 y.o. female who presents for follow up of diabetes. Evaluation to date has been via hemoglobin A1c    Lab Results   Component Value Date    HGBA1C 5.9 (H) 02/09/2021         The following portions of the patient's history were reviewed and updated as appropriate: allergies, current medications, past family history, past medical history, past social history, past surgical history and problem list.    Review of Systems  A 12 point comprehensive review of systems was negative except as noted.     Current Outpatient Medications   Medication Sig Dispense Refill     blood glucose test (CONTOUR TEST STRIPS) strips Check blood sugar once daily.  Dispense brand per patient's insurance at pharmacy discretion. 200 strip 3     calcium-vitamin D (CALCIUM-VITAMIN D) 500 mg(1,250mg) -200 unit per  tablet TAKE 1 TABLET BY MOUTH TWICE A DAY WITH MEALS 60 tablet 11     carbamide peroxide (DEBROX) 6.5 % otic solution Administer 5 drops to the right ear 2 (two) times a day. 15 mL 2     ciclopirox (PENLAC) 8 % solution Apply over nail and surrounding skin. Apply daily over previous coat. After seven (7) days, may remove with alcohol and continue cycle. 6.6 mL 0     generic lancets (MICROLET LANCET) TEST ONCE DAILY AS DIRECTED 200 each 3     metFORMIN (GLUCOPHAGE) 1000 MG tablet Take 1 tablet (1,000 mg total) by mouth 2 (two) times a day. 180 tablet 2     simvastatin (ZOCOR) 10 MG tablet Take 1 tablet (10 mg total) by mouth at bedtime. 90 tablet 3     No current facility-administered medications for this visit.           Objective:    Physical Exam   Vitals:    02/09/21 0828   BP: 112/72   Pulse: (!) 114   Temp: 97.3  F (36.3  C)   SpO2: 95%      Constitutional: Patient is oriented to person, place, and time. Patient appears well-developed and well-nourished. No distress.   Head: Normocephalic and atraumatic.   Right Ear: External ear normal.   Left Ear: External ear normal.   Eyes: Conjunctivae and EOM are normal. Pupils are equal, round, and reactive to light. Right eye exhibits no discharge. Left eye exhibits no discharge. No scleral icterus.   Neck: Neck supple. No JVD present. No tracheal deviation present. No thyromegaly present.   Cardiovascular: Normal rate, regular rhythm, normal heart sounds and intact distal pulses.   Pulmonary/Chest: Effort normal and breath sounds normal. No stridor. No respiratory distress. Patient has no wheezes, no rales, exhibits no tenderness.   Abdominal: Soft. Bowel sounds are normal. Patient exhibits no distension and no mass. There is no tenderness. There is no rebound and no guarding.   Neurological: Patient is alert and oriented to person, place, and time. Patient has normal reflexes. No cranial nerve deficit. Coordination normal.   Skin: Skin is warm and dry. No rash noted.  Patient is not diaphoretic. No erythema. No pallor.   Diabetic foot exam: normal DP and PT pulses, no trophic changes or ulcerative lesions and normal sensory exam thickened yellowish toenails

## 2021-06-16 PROBLEM — B35.1 ONYCHOMYCOSIS: Status: ACTIVE | Noted: 2021-02-09

## 2021-06-16 NOTE — PROGRESS NOTES
ASSESSMENT/PLAN:  Chioma was seen today for possible bladder.    Diagnoses and all orders for this visit:    Frequent UTI  Negative UA today  Advised hydration   Recommend UC every 3 months with her A1c checks  -     Urinalysis-UC if Indicated  -     Culture, Urine    SUBJECTIVE:    Chioma Mccormick is a 46 y.o. female who came in today     Recurrent urinary tract infection  She has Down syndrome with limited ability to articulate symptoms  Most of the urine culture will base on accidents, urine odor reported by her PCA, and random urine culture  She had documented infection via urine culture February 9, 2021,  February 5, 2020, August 14, 2019, June 26, 2019, May 14, 2019    Review of Systems (except those mentioned above)  Constitutional: Negative.   HENT: Negative.   Eyes: Negative.   Respiratory: Negative.   Cardiovascular: Negative.   Gastrointestinal: Negative.   Endocrine: Negative.   Genitourinary: Negative.   Musculoskeletal: Negative.   Skin: Negative.   Allergic/Immunologic: Negative.   Neurological: Negative.   Hematological: Negative.   Psychiatric/Behavioral: Negative.     Patient Active Problem List    Diagnosis Date Noted     Onychomycosis 02/09/2021     Dyslipidemia 03/24/2016     Trisomy 21 (Down Syndrome)      DM (diabetes mellitus) (H)      Anxiety      Mild Intellectual Disabilities      No Known Allergies  Current Outpatient Medications   Medication Sig Dispense Refill     blood glucose test (CONTOUR TEST STRIPS) strips Check blood sugar once daily.  Dispense brand per patient's insurance at pharmacy discretion. 200 strip 3     calcium-vitamin D (CALCIUM-VITAMIN D) 500 mg(1,250mg) -200 unit per tablet TAKE 1 TABLET BY MOUTH TWICE A DAY WITH MEALS 60 tablet 11     carbamide peroxide (DEBROX) 6.5 % otic solution Administer 5 drops to the right ear 2 (two) times a day. 15 mL 2     ciclopirox (PENLAC) 8 % solution Apply over nail and surrounding skin. Apply daily over previous coat. After seven  (7) days, may remove with alcohol and continue cycle. 6.6 mL 0     generic lancets (MICROLET LANCET) TEST ONCE DAILY AS DIRECTED 200 each 3     metFORMIN (GLUCOPHAGE) 1000 MG tablet Take 1 tablet (1,000 mg total) by mouth daily with breakfast. 90 tablet 3     simvastatin (ZOCOR) 10 MG tablet Take 1 tablet (10 mg total) by mouth at bedtime. 90 tablet 3     No current facility-administered medications for this visit.      No past medical history on file.  No past surgical history on file.  Social History     Socioeconomic History     Marital status: Single     Spouse name: None     Number of children: None     Years of education: None     Highest education level: None   Occupational History     None   Social Needs     Financial resource strain: None     Food insecurity     Worry: None     Inability: None     Transportation needs     Medical: None     Non-medical: None   Tobacco Use     Smoking status: Never Smoker     Smokeless tobacco: Never Used   Substance and Sexual Activity     Alcohol use: No     Drug use: No     Sexual activity: None   Lifestyle     Physical activity     Days per week: None     Minutes per session: None     Stress: None   Relationships     Social connections     Talks on phone: None     Gets together: None     Attends Anabaptist service: None     Active member of club or organization: None     Attends meetings of clubs or organizations: None     Relationship status: None     Intimate partner violence     Fear of current or ex partner: None     Emotionally abused: None     Physically abused: None     Forced sexual activity: None   Other Topics Concern     None   Social History Narrative     None     Family History   Problem Relation Age of Onset     Cancer Mother      Diabetes Neg Hx      Heart disease Neg Hx      Breast cancer Neg Hx      Colon cancer Neg Hx          OBJECTIVE:    Vitals:    03/18/21 1506   BP: 122/68   Pulse: 65   SpO2: 98%   Weight: 143 lb 4 oz (65 kg)     Body mass index is  28.93 kg/m .    Physical Exam:  Constitutional: Patient is awake, alert, pleasant. Patient appears well-developed and well-nourished. No distress.   Head: Normocephalic and atraumatic.   Right Ear: External ear normal.   Left Ear: External ear normal.   Eyes: Conjunctivae and EOM are normal. Right eye exhibits no discharge. Left eye exhibits no discharge. No scleral icterus.   Neurological: Patient is alert and pleasant.  Coordination normal.   Skin: No rash noted. Patient is not diaphoretic. No erythema. No pallor.        Results for orders placed or performed in visit on 03/18/21   Urinalysis-UC if Indicated   Result Value Ref Range    Color, UA Yellow Colorless, Yellow, Straw, Light Yellow    Clarity, UA Clear Clear    Glucose, UA Negative Negative    Protein, UA Negative Negative    Bilirubin, UA Negative Negative    Urobilinogen, UA 0.2 E.U./dL 0.2 E.U./dL, 1.0 E.U./dL    pH, UA 6.5 5.0 - 8.0    Blood, UA Negative Negative    Ketones, UA Negative Negative    Nitrite, UA Negative Negative    Leukocytes, UA Negative Negative    Specific Gravity, UA 1.010 1.005 - 1.030

## 2021-06-17 NOTE — PATIENT INSTRUCTIONS - HE
Patient Instructions by Saud Coley MD at 4/17/2019  8:40 AM     Author: Saud Coley MD Service: -- Author Type: Physician    Filed: 4/17/2019  9:43 AM Encounter Date: 4/17/2019 Status: Addendum    : Saud Coley MD (Physician)    Related Notes: Original Note by Saud Coley MD (Physician) filed at 4/17/2019  9:36 AM       Every day:  5 minutes march in place  20 jumping jacks  5 minutes march in place  20 jumping jacks  5 minutes march in place  20 jumping jacks    Patient Education     Your Health Risk Assessment indicates you feel you are not in good physical health.    A healthy lifestyle helps keep the body fit and the mind alert. It helps protect you from disease, helps you fight disease, and helps prevent chronic disease (disease that doesn't go away) from getting worse. This is important as you get older and begin to notice twinges in muscles and joints and a decline in the strength and stamina you once took for granted. A healthy lifestyle includes good healthcare, good nutrition, weight control, recreation, and regular exercise. Avoid harmful substances and do what you can to keep safe. Another part of a healthy lifestyle is stay mentally active and socially involved.    Good healthcare     Have a wellness visit every year.     If you have new symptoms, let us know right away. Don't wait until the next checkup.     Take medicines exactly as prescribed and keep your medicines in a safe place. Tell us if your medicine causes problems.   Healthy diet and weight control     Eat 3 or 4 small, nutritious, low-fat, high-fiber meals a day. Include a variety of fruits, vegetables, and whole-grain foods.     Make sure you get enough calcium in your diet. Calcium, vitamin D, and exercise help prevent osteoporosis (bone thinning).     If you live alone, try eating with others when you can. That way you get a good meal and have company while  you eat it.     Try to keep a healthy weight. If you eat more calories than your body uses for energy, it will be stored as fat and you will gain weight.     Recreation   Recreation is not limited to sports and team events. It includes any activity that provides relaxation, interest, enjoyment, and exercise. Recreation provides an outlet for physical, mental, and social energy. It can give a sense of worth and achievement. It can help you stay healthy.       Patient Education     Exercise for a Healthier Heart  You may wonder how you can improve the health of your heart. If youre thinking about exercise, youre on the right track. You dont need to become an athlete, but you do need a certain amount of brisk exercise to help strengthen your heart. If you have been diagnosed with a heart condition, your doctor may recommend exercise to help stabilize your condition. To help make exercise a habit, choose safe, fun activities.       Be sure to check with your health care provider before starting an exercise program.    Why exercise?  Exercising regularly offers many healthy rewards. It can help you do all of the following:    Improve your blood cholesterol levels to help prevent further heart trouble    Lower your blood pressure to help prevent a stroke or heart attack    Control diabetes, or reduce your risk of getting this disease    Improve your heart and lung function    Reach and maintain a healthy weight    Make your muscles stronger and more limber so you can stay active    Prevent falls and fractures by slowing the loss of bone mass (osteoporosis)    Manage stress better  Exercise tips  Ease into your routine. Set small goals. Then build on them.  Exercise on most days. Aim for a total of 150 or more minutes of moderate to  vigorous intensity activity each week. Consider 40 minutes, 3 to 4 times a week. For best results, activity should last for 40 minutes on average. It is OK to work up to the 40 minute period  over time. Examples of moderate-intensity activity is walking one mile in 15 minutes or 30 to 45 minutes of yard work.  Step up your daily activity level. Along with your exercise program, try being more active throughout the day. Walk instead of drive. Do more household tasks or yard work.  Choose one or more activities you enjoy. Walking is one of the easiest things you can do. You can also try swimming, riding a bike, or taking an exercise class.  Stop exercising and call your doctor if you:    Have chest pain or feel dizzy or lightheaded    Feel burning, tightness, pressure, or heaviness in your chest, neck, shoulders, back, or arms    Have unusual shortness of breath    Have increased joint or muscle pain    Have palpitations or an irregular heartbeat      8387-3476 [employer] [address] [place] [address]. All rights reserved. This information is not intended as a substitute for professional medical care. Always follow your healthcare professional's instructions.         Patient Education   Activities of Daily Living  Your Health Risk Assessment indicates you have difficulties with activities of daily living such as eating, getting dressed, grooming, bathing, walking, or using the toilet. Please make a follow up appointment for us to address this issue in more detail.     Patient Education   Instrumental Activities of Daily Living  Your Health Risk Assessment indicates you have difficulties with instrumental activities of daily living which include laundry, housekeeping, banking, shopping, using the telephone, food preparation, transportation, or taking your own medications. Please make a follow up appointment for us to address this issue in more detail.    [employer] has resources available on the following website: [email or web address]/[email or web address]     Also, here is a local agency that provides help with meals and other assistance:   [facility] Linkage Line: [phone]      Patient Education   Understanding Advance Care Planning  Advance care planning is the process of deciding ones own future medical care. It helps ensure that if you cant speak for yourself, your wishes can still be carried out. The plan is a series of legal documents that note a persons wishes. The documents vary by state. Advance care planning may be done when a person has a serious illness that is expected to get worse. It may be done before major surgery. And it can help you and your family be prepared in case of a major illness or injury. Advance care planning helps with making decisions at these times.       A health care proxy is a person who acts as the voice of a patient when the patient cant speak for himself or herself. The name of this role varies by state. It may be called a Durable Medical Power of  or Durable Power of  for Healthcare. It may be called an agent, surrogate, or advocate. Or it may be called a representative or decision maker. It is an official duty that is identified by a legal document. The document also varies by state.    Why Is Advance Care Planning Important?  If a person communicates their healthcare wishes:    They will be given medical care that matches their values and goals.    Their family members will not be forced to make decisions in a crisis with no guidance.  Creating a Plan  Making an advance care plan is often done in 3 steps:    Thinking about ones wishes. To create an advance care plan, you should think about what kind of medical treatment you would want if you lose the ability to communicate. Are there any situations in which you would refuse or stop treatment? Are there therapies you would want or not want? And whom do you want to make decisions for you? There are many places to learn more about how to plan for your care. Ask your doctor or  for resources.    Picking a health care proxy. This means choosing a trusted person to speak for  you only when you cant speak for yourself. When you cannot make medical decisions, your proxy makes sure the instructions in your advance care plan are followed. A proxy does not make decisions based on his or her own opinions. They must put aside those opinions and values if needed, and carry out your wishes.    Filling out the legal documents. There are several kinds of legal documents for advance care planning. Each one tells health care providers your wishes. The documents may vary by state. They must be signed and may need to be witnessed or notarized. You can cancel or change them whenever you wish. Depending on your state, the documents may include a Healthcare Proxy form, Living Will, Durable Medical Power of , Advance Directive, or others.  The Familys Role  The best help a family can give is to support their loved ones wishes. Open and honest communication is vital. Family should express any concerns they have about the patients choices while the patient can still make decisions.    6841-0561 [employer]. [address] [place] [address]. All rights reserved. This information is not intended as a substitute for professional medical care. Always follow your healthcare professional's instructions.         Also, [facility] offers a free, downloadable health care directive that allows you to share your treatment choices and personal preferences if you cannot communicate your wishes. It also allows you to appoint another person (called a health care agent) to make health care decisions if you are unable to do so. You can download an advance directive by going here: [email or web address]     Patient Education   Personalized Prevention Plan  You are due for the preventive services outlined below.  Your care team is available to assist you in scheduling these services.  If you have already completed any of these items, please share that  information with your care team to update in your medical record.  Health Maintenance   Topic Date Due   ? DIABETES FOOT EXAM  06/27/2017   ? DIABETES OPHTHALMOLOGY EXAM  06/27/2017   ? DIABETES FOLLOW-UP  03/20/2018   ? INFLUENZA VACCINE RULE BASED (1) 08/01/2018   ? PAP SMEAR  10/31/2018   ? DIABETES URINE MICROALBUMIN  04/03/2019   ? DIABETES HEMOGLOBIN A1C  07/16/2019   ? ADVANCE DIRECTIVES DISCUSSED WITH PATIENT  06/27/2021   ? TD 18+ HE  01/16/2029   ? TDAP ADULT ONE TIME DOSE  Completed

## 2021-06-17 NOTE — TELEPHONE ENCOUNTER
Telephone Encounter by Amy Martinez at 8/26/2020  3:05 PM     Author: Amy Martinez Service: -- Author Type: --    Filed: 8/26/2020  3:06 PM Encounter Date: 8/14/2020 Status: Signed    : Amy Martinez APPROVED:    Approval start date: 05/28/2020  Approval end date:  08/26/21    Pharmacy has been notified of approval and will contact patient when medication is ready for pickup.

## 2021-06-17 NOTE — PROGRESS NOTES
ASSESSMENT/PLAN:  DM (diabetes mellitus), worsening  Lab Results   Component Value Date    HGBA1C 7.1 (H) 04/03/2018     A1c up from last time.  Will restart metformin and increase up to 1000mg BID.  Dietary discretion advised.  F/u in 3 months  -     Glycosylated Hemoglobin A1c  -     Microalbumin, Random Urine    Microalbuminuria, stable  Low dose lisinopril    Dyslipidemia, stable  Stable on simvastatin 10mg    H/o recurrent UTI  -     Urinalysis-UC if Indicated    Orders Placed This Encounter   Procedures     Glycosylated Hemoglobin A1c     Urinalysis-UC if Indicated     JIC RED     Microalbumin, Random Urine           CHIEF COMPLAINT:  Chief Complaint   Patient presents with     Follow-up       HISTORY OF PRESENT ILLNESS:  Chioma is a 43 y.o. female presenting to the clinic today for a follow up for diabetes. Here with Zuri, her PCA, today. Her A1c today is 7.1%. She is currently not taking any medications for her diabetes, and is controlling her sugars with diet and life style. She does check her fasting sugars, and they are in the 1teens. The fasting sugars never go above 120. She does not eat much fast foods, and eats well at home. She has stopped drinking soda. She has been continuing the lisinopril 5mg daily.       REVIEW OF SYSTEMS:   Constitutional: Negative.   HENT: Negative.   Eyes: Negative.   Respiratory: Negative.   Cardiovascular: Negative.   Gastrointestinal: Negative.   Endocrine: Negative.   Genitourinary: Negative.   Musculoskeletal: Negative.   Skin: Negative.   Allergic/Immunologic: Negative.   Neurological: Negative.   Hematological: Negative.   Psychiatric/Behavioral: Negative.   All other systems are negative.    PFSH:  She will be starting at a group home on September 1, 2018.     TOBACCO USE:  History   Smoking Status     Never Smoker   Smokeless Tobacco     Never Used       VITALS:  Vitals:    04/03/18 1538   BP: 108/86   Pulse: 92   Weight: 154 lb (69.9 kg)     Wt Readings from  Last 3 Encounters:   04/03/18 154 lb (69.9 kg)   12/28/17 152 lb 9 oz (69.2 kg)   11/02/17 153 lb 8 oz (69.6 kg)       PHYSICAL EXAM:  Constitutional: Patient is oriented to person, place, and time. Patient appears well-developed and well-nourished. No distress.   Cardiovascular: Normal rate, regular rhythm, normal heart sounds and intact distal pulses. No murmur heard.   Pulmonary/Chest: Effort normal and breath sounds normal. No stridor. No respiratory distress. Patient has no wheezes, no rales, exhibits no tenderness.   Neurological: Patient is alert and oriented to person, place, and time.   Skin: Skin is warm and dry. No rash noted. Patient is not diaphoretic. No erythema. No pallor.    Results for orders placed or performed in visit on 04/03/18   Glycosylated Hemoglobin A1c   Result Value Ref Range    Hemoglobin A1c 7.1 (H) 3.5 - 6.1 %   Urinalysis-UC if Indicated   Result Value Ref Range    Color, UA Yellow Colorless, Yellow, Straw, Light Yellow    Clarity, UA Clear Clear    Glucose, UA Negative Negative    Bilirubin, UA Negative Negative    Ketones, UA Negative Negative    Specific Gravity, UA 1.020 1.005 - 1.030    Blood, UA Negative Negative    pH, UA 5.5 5.0 - 8.0    Protein, UA Negative Negative mg/dL    Urobilinogen, UA 1.0 E.U./dL 0.2 E.U./dL, 1.0 E.U./dL    Nitrite, UA Negative Negative    Leukocytes, UA Negative Negative   Microalbumin, Random Urine   Result Value Ref Range    Microalbumin, Random Urine 0.76 0.00 - 1.99 mg/dL    Creatinine, Urine 128.0 mg/dL    Microalbumin/Creatinine Ratio Random Urine 5.9 <=19.9 mg/g       QUALITY MEASURES:  The following high BMI interventions were performed this visit: encouragement to exercise and lifestyle education regarding diet    ADDITIONAL HISTORY SUMMARIZED (2): None.  DECISION TO OBTAIN EXTRA INFORMATION (1): None.   RADIOLOGY TESTS (1): None.  LABS (1): Ordered labs today.  MEDICINE TESTS (1): None.  INDEPENDENT REVIEW (2 each): None.     Jackie CHASE  Diego Miller, am scribing for and in the presence of, Dr. Amador.    I, Saud Jama MD , personally performed the services described in this documentation, as scribed by Jackie Miller in my presence, and it is both accurate and complete.    MEDICATIONS:  Current Outpatient Prescriptions   Medication Sig Dispense Refill     acetaminophen (Q-PAP EXTRA STRENGTH) 500 MG tablet TAKE 2 TABLETS BY MOUTH daily AS NEEDED 90 tablet 1     blood glucose test (CONTOUR TEST STRIPS) strips Use 1 each As Directed as needed. Dispense brand per patient's insurance at pharmacy discretion. 200 strip 3     calcium-vitamin D (CALCIUM-VITAMIN D) 500 mg(1,250mg) -200 unit per tablet TAKE 1 TABLET BY MOUTH 2 (TWO) TIMES A DAY WITH MEALS. 180 tablet 3     generic lancets (MICROLET LANCET) TEST once DAILY AS DIRECTED 300 each 3     lisinopril (PRINIVIL,ZESTRIL) 5 MG tablet Take 1 tablet (5 mg total) by mouth daily. 90 tablet 3     simvastatin (ZOCOR) 10 MG tablet TAKE ONE TABLET BY MOUTH NIGHTLY AT BEDTIME 90 tablet 3     No current facility-administered medications for this visit.        Total data points: 1

## 2021-06-21 NOTE — LETTER
Letter by Saud Coley MD at      Author: Saud Coley MD Service: -- Author Type: --    Filed:  Encounter Date: 2/9/2021 Status: (Other)         February 9, 2021     Patient: Chioma Mccormick   YOB: 1975   Date of Visit: 2/9/2021       To Whom it May Concern:    Chioma Mccormick was seen in my clinic on 2/9/2021.     If you have any questions or concerns, please don't hesitate to call.    Sincerely,         Electronically signed by Saud Jama MD

## 2021-06-23 NOTE — PROGRESS NOTES
DM (diabetes mellitus) (H)  -     Glycosylated Hemoglobin A1c=6.8  Continue with metformin 2000 mg   with healthy lifestyle modification  Follow-up in 3 months    Dyslipidemia  Continue with simvastatin 10 mg    BMI 32.0-32.9,adult  Counseled healthy lifestyle modifications    Trisomy 21 (Down Syndrome)    Encounter for administration of vaccine  -     Td, Preservative Free (green label)      Subjective:   Chioma Mccormick is a 43 y.o. female accompanied by her mother who presents for follow up of diabetes. Evaluation to date has been via hemoglobin A1C and home blood sugars. Home sugars: averaging 115    Patient takes 1 Metformin tablet in the morning and 1 tablet in the evening. She has not had any nausea or stomach ache. Pt has 2 cavities from eating candy. She is taking Metformin 1000 mg, Simvastatin 10 mg, and Lisinopril 5 mg daily. Pt states that she manages her own medication and takes her own blood sugars. Patient has already received her flu shot this year. She is due for a Tetanus booster. Denies paraesthesia of fingers/toes or CP. When asked about her menstrual periods, pt reports they are irregular.      Lab Results   Component Value Date    HGBA1C 6.8 (H) 01/16/2019     The following portions of the patient's history were reviewed and updated as appropriate: allergies, current medications, past family history, past medical history, past social history, past surgical history and problem list.    Review of Systems  A 12 point comprehensive review of systems was negative except as noted.     Current Outpatient Medications   Medication Sig Dispense Refill     acetaminophen (Q-PAP EXTRA STRENGTH) 500 MG tablet TAKE 2 TABLETS BY MOUTH daily AS NEEDED 90 tablet 1     blood glucose test (CONTOUR TEST STRIPS) strips Use 1 each As Directed as needed. Dispense brand per patient's insurance at pharmacy discretion. 200 strip 3     calcium-vitamin D (CALCIUM-VITAMIN D) 500 mg(1,250mg) -200 unit per tablet TAKE 1  TABLET BY MOUTH 2 (TWO) TIMES A DAY WITH MEALS. 180 tablet 3     generic lancets (MICROLET LANCET) TEST once DAILY AS DIRECTED 300 each 3     lisinopril (PRINIVIL,ZESTRIL) 5 MG tablet Take 1 tablet (5 mg total) by mouth daily. 90 tablet 3     metFORMIN (GLUCOPHAGE) 1000 MG tablet Take 1 tablet (1,000 mg total) by mouth 2 (two) times a day. 180 tablet 2     simvastatin (ZOCOR) 10 MG tablet TAKE ONE TABLET BY MOUTH NIGHTLY AT BEDTIME 90 tablet 3     No current facility-administered medications for this visit.       Objective:    Physical Exam   Vitals:    01/16/19 0835   BP: 118/76   Pulse: 64      Constitutional: Patient is oriented to person, place, and time. Patient appears well-developed and well-nourished. No distress.   Head: Normocephalic and atraumatic.   Right Ear: External ear normal.   Left Ear: External ear normal.   Nose: Nose normal.   Mouth/Throat: Oropharynx is clear and moist. No oropharyngeal exudate.   Eyes: Conjunctivae and EOM are normal. Pupils are equal, round, and reactive to light. Right eye exhibits no discharge. Left eye exhibits no discharge. No scleral icterus.   Neck: Neck supple. No JVD present. No tracheal deviation present. No thyromegaly present.   Cardiovascular: Normal rate, regular rhythm, normal heart sounds and intact distal pulses.    Pulmonary/Chest: Effort normal and breath sounds normal. No stridor. No respiratory distress. Patient has no wheezes, no rales, exhibits no tenderness.   Abdominal: Soft. Bowel sounds are normal. Patient exhibits no distension and no mass. There is no tenderness. There is no rebound and no guarding.   Lymphadenopathy:  Patient has no cervical adenopathy.   Neurological: Patient is alert and oriented to person, place, and time. Patient has normal reflexes. No cranial nerve deficit. Coordination normal.   Skin: Skin is warm and dry. No rash noted. Patient is not diaphoretic. No erythema. No pallor.   Normal feet exam      ADDITIONAL HISTORY  SUMMARIZED (2): None.   DECISION TO OBTAIN EXTRA INFORMATION (1): None.   RADIOLOGY TESTS (1): None.  LABS (1): Labs ordered today.   MEDICINE TESTS (1): None.  INDEPENDENT REVIEW (2 each): None.     Total data points = 1    By signing my name below, I, Jackie Luevano, attest that this documentation has been prepared under the direction and in the presence of Dr. Silke Amador.  Electronic Signature: Nasim Shah. 01/16/2019 8:57.    I, Dr. Saud Jama MD\, personally performed the services described in this documentation. All medical record entries made by the scribe were at my direction and in my presence. I have reviewed the chart and discharge instructions (if applicable) and agree that the record reflects my personal performance and is accurate and complete.

## 2021-06-23 NOTE — PATIENT INSTRUCTIONS - HE
Podiatry Clinics that offer foot and toenail care  [facility]  [facility]  [phone]    Foot and Ankle Clinics, [facility]  [phone]    [facility]  [place] - Dr. Alba on Tuesdays  [phone]    [facility]  [place]  [phone]    [facility]  [facility] [place]  [phone]    [facility]  [phone]    [facility]  [phone]    [facility]  They have several locations and have a team of registered nurses that offer diabetic foot care.  They do not bill to insurance and the average cost per visit is $37.  [facility]  [phone]    [facility]  *Nurse comes to your home for nail care.  Hui Oneal RN Foot Specialist  [phone]

## 2021-06-23 NOTE — PATIENT INSTRUCTIONS - HE
1. Schedule a physical visit for 3 months from now  2. Come fasting (don't eat anything) to the physical visit  3. We will do a Pap Smear and diabetes follow-up during the physical visit

## 2021-06-23 NOTE — TELEPHONE ENCOUNTER
Referral Request  Type of referral: Podiatry  Who s requesting: Zuri - helper  Why the request: Diabetic foot care, ingrown toe nail, and thick toe nails  Have you been seen for this request: Yes  Does patient have a preference on a group/provider? [facility]  Okay to leave a detailed message?  Yes

## 2021-06-23 NOTE — PROGRESS NOTES
Admission History & Physical  Chioma Mccormick, 1975, [id number]    [facility] [address]  Marjorie Jama, Saud Edouard MD, [phone]    Extended Emergency Contact Information  Primary Emergency Contact: Zuri Morales   [facility]  Home Phone: [phone]  Mobile Phone: [phone]  Relation: Other     Assessment and Plan:   Assessment: Hallux abductovalgus right foot, pronation deformity bilateral feet, onychomycosis, onychauxis, diabetes mellitus  Plan: Debrided nails 1 through 5 both feet.  I have recommended extra-depth diabetic shoes with molded insoles.  Active Problems:    * No active hospital problems. *      Chief Complaint:  Long thick nails.  Foot pain     HPI:    Chioma Mccormick is a 43 y.o. old female who I was asked to see to evaluate and treat for long untreated nails both feet.  The patient also has complaints of foot pain.  She is unable to treat her nails due to the thickness of the nails.  She does have a caretaker who has attempted to treat the nails unsuccessfully.  The patient has never had any redness, swelling, drainage of bleeding surrounding these nails.  The nails can be aggravated by ambulation and normal shoe wear.  This is easily relieved by removing her shoe gear.  She denies any other previous treatment. The patient was referred by Dr.Theresa Martinez.  History is provided by patient    Medical History  Active Ambulatory (Non-Hospital) Problems    Diagnosis     BMI 32.0-32.9,adult     Dyslipidemia     Trisomy 21 (Down Syndrome)     DM (diabetes mellitus) (H)     Anxiety     Mild Intellectual Disabilities     No past medical history on file.  Patient Active Problem List    Diagnosis Date Noted     BMI 32.0-32.9,adult 01/16/2019     Dyslipidemia 03/24/2016     Trisomy 21 (Down Syndrome)      DM (diabetes mellitus) (H)      Anxiety      Mild Intellectual Disabilities      Surgical History  She  has no past surgical history on file.   No past surgical  history on file. Social History  Reviewed, and she  reports that  has never smoked. she has never used smokeless tobacco. She reports that she does not drink alcohol or use drugs.  Social History     Tobacco Use     Smoking status: Never Smoker     Smokeless tobacco: Never Used   Substance Use Topics     Alcohol use: No      Allergies  No Known Allergies Family History  Reviewed, and family history includes Cancer in her mother.   Psychosocial Needs  Social History     Social History Narrative     Not on file     Additional psychosocial needs reviewed per nursing assessment.       Prior to Admission Medications     (Not in a hospital admission)        Review of Systems -comprehensive review was negative with the exception of those listed below.    /80   Pulse 64   Temp 98.5  F (36.9  C)   Resp 16     Objective findings: General: The patient is alert and in no acute distress     Integument: Nails bilateral feet are elongated, 2 times normal thickness and severely discolored. Skin bilateral feet warm and intact.  No skin lesions are noted.      Vascular: DP and PT pulses +2 over 4 bilateral feet.  Capillary refill less than 2 seconds bilateral feet to all digits.     Neurologic: Negative clonus, negative Babinski bilateral feet.     Mucous membranes are moist     Musculoskeletal: Range of motion within normal limits bilateral feet.  Muscle power +5 over 5 bilaterally with lateral compartments.  There is mild flattening of the medial longitudinal arch noted bilaterally.  There is a large firm subcutaneous mass on the medial aspect of the head of the first metatarsal right foot.  There is a severe lateral deviation of the hallux with buttresses the second toe right foot.  The first metatarsal phalangeal joint right foot is track bound.  There is no pain on range of motion of the first MPJ right foot.     Psychiatric: Patient mood and affect were normal. Patient insight and judgment were normal.     Assessment:  Hallux abductovalgus, pronation deformity, onychomycosis, onychauxis, diabetes mellitus      Plan: I debrided nails 1 through 5 both feet today.  I have also recommended extra-depth diabetic shoes with molded insoles.  The patient is to return to the clinic as needed.

## 2021-06-23 NOTE — TELEPHONE ENCOUNTER
RN cannot approve Refill Request    RN can NOT refill this medication med is not covered by policy/route to provider. Last office visit: 1/16/2019 Saud Coley MD Last Physical: 3/1/2017 Last MTM visit: Visit date not found Last visit same specialty: 1/16/2019 Saud Coley MD.  Next visit within 3 mo: Visit date not found  Next physical within 3 mo: Visit date not found      Lynette Vasquez, [facility] Triage/Med Refill 1/21/2019    Requested Prescriptions   Pending Prescriptions Disp Refills     acetaminophen (Q-PAP EXTRA STRENGTH) 500 MG tablet [Pharmacy Med Name: Q-PAP EX- MG TABLET] 90 tablet 0     Sig: TAKE 2 TABLETS BY MOUTH DAILY AS NEEDED    There is no refill protocol information for this order

## 2021-06-24 NOTE — TELEPHONE ENCOUNTER
RN cannot approve Refill Request    RN can NOT refill this medication med is not covered by policy/route to provider     . Last office visit: 1/16/2019 Saud Coley MD Last Physical: 3/1/2017 Last MTM visit: Visit date not found Last visit same specialty: 1/16/2019 Saud Coley MD.  Next visit within 3 mo: Visit date not found  Next physical within 3 mo: Visit date not found      Jazmyn King, [facility] Triage/Med Refill 2/27/2019    Requested Prescriptions   Pending Prescriptions Disp Refills     calcium-vitamin D (CALCIUM-VITAMIN D) 500 mg(1,250mg) -200 unit per tablet 180 tablet 3     Sig: TAKE 1 TABLET BY MOUTH 2 (TWO) TIMES A DAY WITH MEALS.    There is no refill protocol information for this order

## 2021-06-24 NOTE — PROGRESS NOTES
S: Patient with guardian were seen today at the [facility] in [place] with a prescription from Dr. Castro for diabetic shoes and FOs. According to the guardian, patient has been diagnosed with type 2 DM for 7 years and takes metformin to control her condition. Dr. Cottrell monitors the patient's condition and was last seen 2 weeks ago. Guardian also states that patient complains of bilateral medial longitudinal arch pain while weight bearing at work. Patient works at [employer] were she spends the majority of the work day on her feet.     O: Patient presents with bilateral bunions, bilateral pes plano valgus deformity (reducible), and callus formation at the lateral aspect of the LT 5th toe. Patient's feet were measured with a woman's Anodyne Brannock device. RT and LT foot measured 6/6.5. Current shoes are Ugi boots size 7 which are showing signs of excessive wear and tear but are an appropriate size and are not Medicare compliant. Patient does not have diabetic FOs.  Patient ambulates with antalgic gait and without the use of ambulatory aids. Patient is alert/oriented during the appointment and is not in distress. Patient's guardian did the majority of the talking.      A: Impressions were taken with foam blocks as the patient was semi weight bearing. FOs will be fabricated by [employer] with diabetic approved materials. FOs will follow Medicare guidelines which include being 3/16'' minimum base material with a Shore durometer of 35 or more. Custom FOs are required due to the need to accommodate patient's pes plano valgus deformity. 3 pairs of custom FOs are needed to extended durability/pressure component of FOs and to allow for exchange of FOs for cleaning and hygiene.  Ordered Gaz[employer]etrixne #[id number] Sherman Walker, dark grey color size 6 W, 6.5 MD for fitting (order #[id number]). Diabetic shoes are needed due to patient's current shoes being in poor condition (wear and tear), shoe design is not compliant  with Medicare guidelines, and that feet will not fit in commercial shoes and require properly fitted off the shelf orthopedic shoes.    G: Diabetic shoes and custom diabetic FOs will treat patient's current condition by protecting diabetic insensate feet, optimizing gait by reducing adverse pressure to the feet, and providing appropriate footwear for protection, stability, and comfort.    P: Patient will return to the [facility] for fitting.    This note was electronically signed by Brian JOE , ABC #[id number], License #[id number]

## 2021-06-26 ENCOUNTER — HEALTH MAINTENANCE LETTER (OUTPATIENT)
Age: 46
End: 2021-06-26

## 2021-06-28 NOTE — PROGRESS NOTES
Progress Notes by Bernarda Rodriguez CHW at 11/6/2019  8:32 AM     Author: Bernarda Rodriguez CHW Service: -- Author Type: Community Health Worker    Filed: 11/6/2019  8:39 AM Encounter Date: 11/6/2019 Status: Signed    : Bernarda Rodriguez CHW (Community Health Worker)       My Clinic Care Coordination Wellness Plan  This Graduation Wellness Plan provides private information in regard to the work I have done with my Care Team at my Primary Care Clinic.  This document provides insight on the goals I have accomplished.  My Care Team congratulates me on my journey to maintain wellness.  This document will help guide me on my journey to maintain a healthy lifestyle.  I will use this to help me overcome any barriers I may encounter.  If I should have any questions or concerns, I will contact the members of my Care Team or contact my Primary Care Clinic for [place] [address]  [phone]    My Preferred Method of Contact:  MyChart    My Primary/Preferred Language:  English    Preferred Learning Style:  Reading information, Face to face discussion, Pictures/Diagrams and Hands on teaching    Emergency Contact: Extended Emergency Contact Information  Primary Emergency Contact: Zuri Morales   [facility]  Home Phone: [phone]  Mobile Phone: [phone]  Relation: Other  Secondary Emergency Contact: NONE, PER PT   [place]  Relation: Declined     PCP:  Saud Coley MD  Specialists:    Care Team            Saud Coley MD   [phone] PCP - General, Family Medicine    Carmen Back   [phone]     MARYANA Prado     Zuri [place] Patient Care Assistant    Rafael Castro, DPTRISTIN   [phone] Physician, Podiatry    Alber Parikh DDS   [phone] Dentist    Alber Parikh [facility]    [facility]   [phone]     Karmen Thomas LPN Licensed  Practical Nurse    Podiatry    Saud Coley MD   [phone] Assigned PCP        Accomplishments:  Goals        Patient Stated    ? COMPLETED: I have accomplished looking into ongoing support and access to resources with my DD  Carmen Back. (pt-stated)      Personal Plan  For ongoing support and access to resources I will contact my DD  Carmen Back @ [phone].      ? COMPLETED: I have accomplished starting to save money for a bike, but will wait until next summer to pursue purchasing a bike. (pt-stated)      Personal Plan  I have decided I want to wait until next summer to purchase a bike.            Advanced Directive/Living Will: The patient was given information regarding Adanced Directives/Living Will    Clinical Emergency Plan    jLong-Term Complications of Diabetes can cause health problems over time. These are called complications. They are more likely to happen if your blood sugar is often too high. Over time, high blood sugar can damage blood vessels in your body. It is important to keep your blood sugar in your target range. This can help prevent or delay complications from diabetes.  Possible complications  Complications of diabetes include:    Eye problems, including damage to the blood vessels in the eyes (retinopathy), pressure in the eye (glaucoma), and clouding of the eyes lens (a cataract). Eye problems can eventually lead to irreversible blindness.     Tooth and gum problems (periodontal disease), causing loss of teeth and bone    Blood vessel (vascular) disease leading to circulation problems, heart attack or stroke, or a need for amputation of a limb     Problems with sexual function leading to erectile dysfunction in men and sexual discomfort in women     Kidney disease (nephropathy) can eventually lead to kidney failure, which may require dialysis or kidney transplant     Nerve problems (neuropathy), causing pain or loss of feeling in your  feet and other parts of your body, potentially leading to an amputation of a limb     High blood pressure (hypertension), putting strain on your heart and blood vessels    Serious infections, possibly leading to loss of toes, feet, or limbs  How to avoid complications  The serious consequences of these complications may be avoidable for most people with diabetes by managing your blood glucose, blood pressure, and cholesterol levels. This can help you feel better and stay healthy. You can manage diabetes by tracking your blood sugar. You can also eat healthy and exercise to avoid gaining weight. And you should take medicine if directed by your healthcare provider.  Call your health care provider if:    You vomit or have diarrhea for more than 6 hours.    Your blood glucose level is higher than usual or over 250 mg/dL after you have taken extra insulin (if recommended in your sick-day plan).    You take oral medicine for diabetes, and your blood sugar is higher than usual or over 250 mg/dL, before a meal and stays that high for more than 24 hours.    Your blood glucose is lower than usual or less than 70 mg/dL    You have moderate to large amounts of ketones in your blood or urine.    You arent better after 2 days.         Understanding cholesterol  Cholesterol is a type of fat (lipid) thats carried in the blood. Your body makes cholesterol in the liver. You also get it from certain foods. Your body needs some cholesterol to build healthy cells. But too much cholesterol can cause it to build up in blood vessels forming plaque. Plaque is a fatty substance. Over time, this plaque can narrow and harden the blood vessels. This reduces or blocks blood flow in these vessels and raises your risk for heart attack (acute myocardial infarction, or AMI), stroke, and other health problems. This is known as atherosclerosis or cardiovascular disease.  Types of lipids  Your blood has 3 key fats:    LDL (low-density lipoprotein)  cholesterol. This is called bad because it can build up in the blood vessels.    HDL (high-density lipoprotein) cholesterol. This is called good because it helps get rid of excess cholesterol in the bloodstream.    Triglycerides. Your body uses this form of fat to store energy. Like LDL cholesterol, this fat can cause plaque to build up in the blood vessels.  Healthy cholesterol levels  You can find out your levels by having a blood test. High blood cholesterol is a risk factor for atherosclerosis. Talk to your healthcare provider about what level is best for you. To find out more about cholesterol levels.  You may need your cholesterol levels checked at regular intervals. This is to see if you are meeting the cholesterol goals you and your provider have agreed on. Make sure you know what this schedule will be and how to get ready for this test.  In addition to eating a healthy diet and getting regular exercise, some people may need to take medicines called statins to control their cholesterol. This can help prevent a heart attack or stroke. In addition to your LDL levels, certain factors may put you at high risk of atherosclerosis. These include:    Diabetes    Smoking    High blood pressure    Lack of exercise    Obesity    Family members who developed early heart disease (males under age 55 and females under age 65)    Metabolic syndrome    Chronic kidney disease    Chronic inflammatory conditions such as rheumatoid arthritis    History of premature menopause (before age 40)    History of pregnancy-associated conditions such as pre-eclampsia    Ethnicity (for example, from [place])  It's also important to consider your age and previous health history. Talk with your healthcare provider about your personal risk for heart disease and your treatment goals. Make sure you understand why these goals are important, based on your own health history and your family history of heart disease or high cholesterol.  Make a  plan to have regular cholesterol checks. You may need to fast before getting this test. Also ask your provider about any side effects your medicines may cause. Let your provider know about any side effects you have. You may need to take more than one medicine to reach the cholesterol goals that you and your provider decide on.  Taking cholesterol medicines  Take your medicine exactly as your healthcare provider tells you to. This will help it work best. Here are tips for taking cholesterol medicines:    Know when and how to take your medicines. Some may need to be taken with food. Others may need to be taken on an empty stomach or at a certain time of day.    Stick to a schedule. Try the following:    Dont skip doses or stop taking your medicine. This is important even if you feel better or if your cholesterol numbers improve.    Set things up to help you remember. For instance, work taking your medicines into your routine. You could plan to take them when you get up in the morning or when you go to bed at night.    Keep track of what you take. You may take a few different medicines. If so, a list or chart can help you take the right pills at the right time. A pillbox with days of the week or times of day is also a good tool for keeping track.    Prevent medicine interactions. Some medicines and supplements can interact with one another. This means they affect how other medicines work when taken together. Be sure to tell your healthcare provider about all other medicines you take. This includes vitamins, herbs, and over-the-counter medicines.    Know how to deal with side effects. Many people have side effects when they first start taking a medicine. These are things like headache and stomach upset. Side effects should go away in a few weeks. Tell your healthcare provider about any side effects you have. Certain side effects should be reported to your healthcare provider right away. These include yellowing of the  eyes and blurred vision. Also report muscle aches and breathing problems.  If you are pregnant or breastfeeding, tell your health care provider before taking any cholesterol medicines.  Types of cholesterol medicines     Medicines can help control the amount of cholesterol in the blood. There are several types. Each controls cholesterol in a different way. They also have different effects in terms of how well they work to lower cholesterol and reduce death rates. Discuss these effects with your healthcare provider. Your healthcare provider will prescribe the type of medicine that is best for you. Medicines may be used alone or combined. The main types are:    Statins (HMG-CoA reductase inhibitors). Statins are thought to be the best at lowering cholesterol. They do this by keeping your body from making cholesterol. This then prompts the liver to remove cholesterol from your blood thereby lowering LDL cholesterol and may even reabsorb cholesterol from plaque build ups. . Benefits: Statins lower LDL cholesterol. They also slightly raise HDL cholesterol and lower triglycerides.    Selective cholesterol absorption inhibitors. These prevent your body from absorbing cholesterol from food. They may be prescribed to use alone or with a statin. Benefits: These medicines lower LDL cholesterol. They also slightly raise HDL cholesterol and lower triglycerides.    Resins (bile acid sequestrants or bile acid-binding medicines). Resins help you get rid of cholesterol through the intestines. They work by binding to bile. Bile is a substance that helps the body digest food. Your body uses cholesterol to make bile. Normally, most bile is absorbed by the body during digestion. But when bile is bound to resin, it's eliminated from the body. So the body must make more bile. To do this, the body takes up more cholesterol from the blood. Benefits: Resins lower LDL cholesterol.    Fibrates (fibric acid derivatives). These are best at  cutting back on how many triglycerides your body makes. They dont work well to lower LDL. Benefits: Fibrates lower triglycerides. They also raise HDL cholesterol.    Niacin (nicotinic acid). Niacin (vitamin B-3) limits the liver's ability to make blood fats. But dont use over-the-counter niacin for cholesterol problems. It isnt regulated by the FDA. Benefits: Niacin raises HDL cholesterol. It also lowers triglycerides and LDL cholesterol.    Omega-3 fatty acids. These reduce the amount of triglycerides your body makes. They also help to clear these lipids from the blood. Omega-3 fatty acids are found in many foods. These include salmon and other oily fish, and walnuts. Your healthcare provider may prescribe these fatty acids in capsule form. Benefits: Omega-3s lower triglycerides. (Note: They may increase LDL cholesterol in some patients.)     PCSK9 inhibitors. These medicines lower LDL cholesterol levels by breaking down the chemicals in the liver that control making LDL cholesterol. These medicines are given by an injection. They are used for people who have an inherited form of high cholesterol (familial hypercholesterolemia). They are also for people who have a hard time controlling their cholesterol with other medicines.  A healthy lifestyle  In addition to medicine, treatment for high cholesterol includes lifestyle changes. a healthy lifestyle is a crucial part of preventing heart disease and stroke from cardiovascular disease. Your healthcare provider will help you make changes your lifestyle if needed. These changes are needed to help you lower your cholesterol and keep the cardiovascular disease from getting worse. Things you may need to work on are:  Diet  Your healthcare provider will give you information on changes that you may need to make to your diet. You may need to see a registered dietitian or nutritionist for help with these changes. You might be asked to:    Eat less meat containing saturated fat  "and high levels of cholesterol    Eat less sodium (salt), especially if you have high blood pressure    Eat more fresh vegetables and fruits    Eat lean protein, like fish, chicken and turkey, and beans and peas    Eat fewer processed meats, like deli meats, sausage, and pepperoni    Choose low-fat milk, yogurt and cheese    Use vegetable and nut oils instead of butter, shortening, and margarine    Limit sweets and packaged foods, like chips, cookies, and baked goods    Limit eating out at restaurants and eating fast foods  Physical activity  Your healthcare provider may advise you to be more active. Exercise helps to increase your body's HLD, or \"good\" cholesterol. Depending on your health, your provider may recommend that you get moderate to vigorous intensity exercise for at least 40 minutes each day, 3 to 4 days each week. Some examples of moderate to vigorous exercise are:    Walking at a brisk pace, about 3 to4 miles per hour    Jogging or running    Riding a bicycle or stationary bike    Swimming or water aerobics    Dancing    Hiking    Martial arts    Tennis  You may not be able to do 40 minutes right away. You may have to start with 5 to 10 minutes a day, 2 times a day, and then keep adding to it until you can do 40 minutes.  Weight management  If you are overweight or obese, your healthcare provider may tell you to lose weight and lower your BMI (body mass index). Changing your diet and getting more exercise can help. Controlling the number of calories you eat is the best way to lose weight.  Smoking  If you smoke, get help to quit now. Ask your healthcare provider for information on medicines that can help you fight cravings. Enroll in a stop-smoking program to improve your chances of success. Ask your provider for smoking-cessation referrals and products.  Stress  Learn ways to help you deal with stress in your home and work life. Here are a few ideas:    Take a yoga class. You can find classes at local " community centers or on the Internet.    Get regular exercise. Exercise helps your mind let go of problems and release stress in your muscles.    Deep breathing. Take a few minutes several times a day to just sit quietly and breathe. Concentrate on the air going into and out of your body.    Talk with loved ones. Take a few minutes each day to connect with people that make you feel supported.    9358-4918 The "[employer]". [address] [place] [address], [place] 1130067. All rights reserved. This information is not intended as a substitute for professional medical care. Always follow your healthcare professional's instructions.      Back to Top   About Version 6.7    2019 [employer]      Emergency Plan Recommendation:     When to Use the Emergency Department (ED)  An emergency means you could die if you dont get care quickly. Or you could be hurt permanently (disabled). Read below to know when to use -- and when not to use -- an emergency department (also called ED).     Dangers to your life  Here are examples of emergencies. These need immediate care:  A hard time breathing  Severe chest pain  Choking  Severe bleeding  Suddenly not able to move or speak  Blacking out (fainting)`  Poisoning     Dangers of permanent injuries  Here are other emergencies. These also need immediate care:  Deep cuts or severe burns  Broken bones, or sudden severe pain and swelling in a joint     When its an emergency  If you have an emergency, follow these steps:     1. Go to the nearest emergency department  If you can, go to the hospital ED closest to you right away.  If you cannot get there right away, or if it is not safe to take yourself, call 911 or your police emergency number.  2. Call your primary care doctor  Tell your doctor about the emergency. Call within 24 hours of going to the ED.  If you cannot call, have someone call for you.  Go to your doctor (not the ED) for any follow-up care.     When its not an  emergency  If a problem is not an emergency, follow these steps:     1. Call your primary care doctor  If you dont know the name of your doctor, call your health plan.  If you cannot call, have someone call for you.  2. Follow instructions  Your doctor will tell you what you should do.  You may be told to see your doctor right away. You may be told to go to the ED. Or you may be told to go to an urgent care center.  Follow your doctors advice.      All [facility] clinic patients have access to a Nurse 24 hours a day, 7 days a week.  If you have questions or want advice from a Nurse, please know [facility] is here for you.  You can call your clinic and they will connect you or you can call [facility] at [phone].  [facility] also has Walk In Care clinics in multiple locations.  Call the number listed above for more information about our Walk In Care clinics or visit the [facility] website at [email or web address].

## 2021-06-28 NOTE — PROGRESS NOTES
Progress Notes by Lizzeth Cortes RN at 11/5/2019  2:11 PM     Author: Lizzeth Cortes RN Service: -- Author Type: Registered Nurse    Filed: 11/5/2019  2:14 PM Encounter Date: 11/5/2019 Status: Signed    : Lizzeth Cortes RN (Registered Nurse)       Clinic Care Coordination Contact    Assessment: Care Coordinator [facility] contacted patient for 2 month follow up.  Patient has continued to follow the plan of care and assessment is negative for any new needs or concerns.    Enrollment status: Graduated.      Plan: No further outreaches at this time.  Patient will continue to follow the plan of care.  If new needs arise a new Care Coordination referral may be placed.  FYI to PCP      jLong-Term Complications of Diabetes can cause health problems over time. These are called complications. They are more likely to happen if your blood sugar is often too high. Over time, high blood sugar can damage blood vessels in your body. It is important to keep your blood sugar in your target range. This can help prevent or delay complications from diabetes.  Possible complications  Complications of diabetes include:    Eye problems, including damage to the blood vessels in the eyes (retinopathy), pressure in the eye (glaucoma), and clouding of the eyes lens (a cataract). Eye problems can eventually lead to irreversible blindness.     Tooth and gum problems (periodontal disease), causing loss of teeth and bone    Blood vessel (vascular) disease leading to circulation problems, heart attack or stroke, or a need for amputation of a limb     Problems with sexual function leading to erectile dysfunction in men and sexual discomfort in women     Kidney disease (nephropathy) can eventually lead to kidney failure, which may require dialysis or kidney transplant     Nerve problems (neuropathy), causing pain or loss of feeling in your feet and other parts of your body, potentially leading to an amputation of a limb     High blood pressure  (hypertension), putting strain on your heart and blood vessels    Serious infections, possibly leading to loss of toes, feet, or limbs  How to avoid complications  The serious consequences of these complications may be avoidable for most people with diabetes by managing your blood glucose, blood pressure, and cholesterol levels. This can help you feel better and stay healthy. You can manage diabetes by tracking your blood sugar. You can also eat healthy and exercise to avoid gaining weight. And you should take medicine if directed by your healthcare provider.  Call your health care provider if:    You vomit or have diarrhea for more than 6 hours.    Your blood glucose level is higher than usual or over 250 mg/dL after you have taken extra insulin (if recommended in your sick-day plan).    You take oral medicine for diabetes, and your blood sugar is higher than usual or over 250 mg/dL, before a meal and stays that high for more than 24 hours.    Your blood glucose is lower than usual or less than 70 mg/dL    You have moderate to large amounts of ketones in your blood or urine.    You arent better after 2 days.    Understanding cholesterol  Cholesterol is a type of fat (lipid) thats carried in the blood. Your body makes cholesterol in the liver. You also get it from certain foods. Your body needs some cholesterol to build healthy cells. But too much cholesterol can cause it to build up in blood vessels forming plaque. Plaque is a fatty substance. Over time, this plaque can narrow and harden the blood vessels. This reduces or blocks blood flow in these vessels and raises your risk for heart attack (acute myocardial infarction, or AMI), stroke, and other health problems. This is known as atherosclerosis or cardiovascular disease.  Types of lipids  Your blood has 3 key fats:    LDL (low-density lipoprotein) cholesterol. This is called bad because it can build up in the blood vessels.    HDL (high-density lipoprotein)  cholesterol. This is called good because it helps get rid of excess cholesterol in the bloodstream.    Triglycerides. Your body uses this form of fat to store energy. Like LDL cholesterol, this fat can cause plaque to build up in the blood vessels.  Healthy cholesterol levels  You can find out your levels by having a blood test. High blood cholesterol is a risk factor for atherosclerosis. Talk to your healthcare provider about what level is best for you. To find out more about cholesterol levels.  You may need your cholesterol levels checked at regular intervals. This is to see if you are meeting the cholesterol goals you and your provider have agreed on. Make sure you know what this schedule will be and how to get ready for this test.  In addition to eating a healthy diet and getting regular exercise, some people may need to take medicines called statins to control their cholesterol. This can help prevent a heart attack or stroke. In addition to your LDL levels, certain factors may put you at high risk of atherosclerosis. These include:    Diabetes    Smoking    High blood pressure    Lack of exercise    Obesity    Family members who developed early heart disease (males under age 55 and females under age 65)    Metabolic syndrome    Chronic kidney disease    Chronic inflammatory conditions such as rheumatoid arthritis    History of premature menopause (before age 40)    History of pregnancy-associated conditions such as pre-eclampsia    Ethnicity (for example, from [place])  It's also important to consider your age and previous health history. Talk with your healthcare provider about your personal risk for heart disease and your treatment goals. Make sure you understand why these goals are important, based on your own health history and your family history of heart disease or high cholesterol.  Make a plan to have regular cholesterol checks. You may need to fast before getting this test. Also ask your provider  about any side effects your medicines may cause. Let your provider know about any side effects you have. You may need to take more than one medicine to reach the cholesterol goals that you and your provider decide on.  Taking cholesterol medicines  Take your medicine exactly as your healthcare provider tells you to. This will help it work best. Here are tips for taking cholesterol medicines:    Know when and how to take your medicines. Some may need to be taken with food. Others may need to be taken on an empty stomach or at a certain time of day.    Stick to a schedule. Try the following:    Dont skip doses or stop taking your medicine. This is important even if you feel better or if your cholesterol numbers improve.    Set things up to help you remember. For instance, work taking your medicines into your routine. You could plan to take them when you get up in the morning or when you go to bed at night.    Keep track of what you take. You may take a few different medicines. If so, a list or chart can help you take the right pills at the right time. A pillbox with days of the week or times of day is also a good tool for keeping track.    Prevent medicine interactions. Some medicines and supplements can interact with one another. This means they affect how other medicines work when taken together. Be sure to tell your healthcare provider about all other medicines you take. This includes vitamins, herbs, and over-the-counter medicines.    Know how to deal with side effects. Many people have side effects when they first start taking a medicine. These are things like headache and stomach upset. Side effects should go away in a few weeks. Tell your healthcare provider about any side effects you have. Certain side effects should be reported to your healthcare provider right away. These include yellowing of the eyes and blurred vision. Also report muscle aches and breathing problems.  If you are pregnant or breastfeeding,  tell your health care provider before taking any cholesterol medicines.  Types of cholesterol medicines    Medicines can help control the amount of cholesterol in the blood. There are several types. Each controls cholesterol in a different way. They also have different effects in terms of how well they work to lower cholesterol and reduce death rates. Discuss these effects with your healthcare provider. Your healthcare provider will prescribe the type of medicine that is best for you. Medicines may be used alone or combined. The main types are:    Statins (HMG-CoA reductase inhibitors). Statins are thought to be the best at lowering cholesterol. They do this by keeping your body from making cholesterol. This then prompts the liver to remove cholesterol from your blood thereby lowering LDL cholesterol and may even reabsorb cholesterol from plaque build ups. . Benefits: Statins lower LDL cholesterol. They also slightly raise HDL cholesterol and lower triglycerides.    Selective cholesterol absorption inhibitors. These prevent your body from absorbing cholesterol from food. They may be prescribed to use alone or with a statin. Benefits: These medicines lower LDL cholesterol. They also slightly raise HDL cholesterol and lower triglycerides.    Resins (bile acid sequestrants or bile acid-binding medicines). Resins help you get rid of cholesterol through the intestines. They work by binding to bile. Bile is a substance that helps the body digest food. Your body uses cholesterol to make bile. Normally, most bile is absorbed by the body during digestion. But when bile is bound to resin, it's eliminated from the body. So the body must make more bile. To do this, the body takes up more cholesterol from the blood. Benefits: Resins lower LDL cholesterol.    Fibrates (fibric acid derivatives). These are best at cutting back on how many triglycerides your body makes. They dont work well to lower LDL. Benefits: Fibrates lower  triglycerides. They also raise HDL cholesterol.    Niacin (nicotinic acid). Niacin (vitamin B-3) limits the liver's ability to make blood fats. But dont use over-the-counter niacin for cholesterol problems. It isnt regulated by the FDA. Benefits: Niacin raises HDL cholesterol. It also lowers triglycerides and LDL cholesterol.    Omega-3 fatty acids. These reduce the amount of triglycerides your body makes. They also help to clear these lipids from the blood. Omega-3 fatty acids are found in many foods. These include salmon and other oily fish, and walnuts. Your healthcare provider may prescribe these fatty acids in capsule form. Benefits: Omega-3s lower triglycerides. (Note: They may increase LDL cholesterol in some patients.)     PCSK9 inhibitors. These medicines lower LDL cholesterol levels by breaking down the chemicals in the liver that control making LDL cholesterol. These medicines are given by an injection. They are used for people who have an inherited form of high cholesterol (familial hypercholesterolemia). They are also for people who have a hard time controlling their cholesterol with other medicines.  A healthy lifestyle  In addition to medicine, treatment for high cholesterol includes lifestyle changes. a healthy lifestyle is a crucial part of preventing heart disease and stroke from cardiovascular disease. Your healthcare provider will help you make changes your lifestyle if needed. These changes are needed to help you lower your cholesterol and keep the cardiovascular disease from getting worse. Things you may need to work on are:  Diet  Your healthcare provider will give you information on changes that you may need to make to your diet. You may need to see a registered dietitian or nutritionist for help with these changes. You might be asked to:    Eat less meat containing saturated fat and high levels of cholesterol    Eat less sodium (salt), especially if you have high blood pressure    Eat more  "fresh vegetables and fruits    Eat lean protein, like fish, chicken and turkey, and beans and peas    Eat fewer processed meats, like deli meats, sausage, and pepperoni    Choose low-fat milk, yogurt and cheese    Use vegetable and nut oils instead of butter, shortening, and margarine    Limit sweets and packaged foods, like chips, cookies, and baked goods    Limit eating out at restaurants and eating fast foods  Physical activity  Your healthcare provider may advise you to be more active. Exercise helps to increase your body's HLD, or \"good\" cholesterol. Depending on your health, your provider may recommend that you get moderate to vigorous intensity exercise for at least 40 minutes each day, 3 to 4 days each week. Some examples of moderate to vigorous exercise are:    Walking at a brisk pace, about 3 to4 miles per hour    Jogging or running    Riding a bicycle or stationary bike    Swimming or water aerobics    Dancing    Hiking    Martial arts    Tennis  You may not be able to do 40 minutes right away. You may have to start with 5 to 10 minutes a day, 2 times a day, and then keep adding to it until you can do 40 minutes.  Weight management  If you are overweight or obese, your healthcare provider may tell you to lose weight and lower your BMI (body mass index). Changing your diet and getting more exercise can help. Controlling the number of calories you eat is the best way to lose weight.  Smoking  If you smoke, get help to quit now. Ask your healthcare provider for information on medicines that can help you fight cravings. Enroll in a stop-smoking program to improve your chances of success. Ask your provider for smoking-cessation referrals and products.  Stress  Learn ways to help you deal with stress in your home and work life. Here are a few ideas:    Take a yoga class. You can find classes at local community centers or on the Internet.    Get regular exercise. Exercise helps your mind let go of problems and " release stress in your muscles.    Deep breathing. Take a few minutes several times a day to just sit quietly and breathe. Concentrate on the air going into and out of your body.    Talk with loved ones. Take a few minutes each day to connect with people that make you feel supported.    7585-4110 [employer]. [address], [place] [address]. All rights reserved. This information is not intended as a substitute for professional medical care. Always follow your healthcare professional's instructions.     Back to Top   About Version 6.7    2019 [employer]     Emergency Plan Recommendation:    When to Use the Emergency Department (ED)  An emergency means you could die if you dont get care quickly. Or you could be hurt permanently (disabled). Read below to know when to use -- and when not to use -- an emergency department (also called ED).    Dangers to your life  Here are examples of emergencies. These need immediate care:  A hard time breathing  Severe chest pain  Choking  Severe bleeding  Suddenly not able to move or speak  Blacking out (fainting)`  Poisoning    Dangers of permanent injuries  Here are other emergencies. These also need immediate care:  Deep cuts or severe burns  Broken bones, or sudden severe pain and swelling in a joint    When its an emergency  If you have an emergency, follow these steps:    1. Go to the nearest emergency department  If you can, go to the hospital ED closest to you right away.  If you cannot get there right away, or if it is not safe to take yourself, call 911 or your police emergency number.  2. Call your primary care doctor  Tell your doctor about the emergency. Call within 24 hours of going to the ED.  If you cannot call, have someone call for you.  Go to your doctor (not the ED) for any follow-up care.    When its not an emergency  If a problem is not an emergency, follow these steps:    1. Call your primary care doctor  If you dont know the  name of your doctor, call your health plan.  If you cannot call, have someone call for you.  2. Follow instructions  Your doctor will tell you what you should do.  You may be told to see your doctor right away. You may be told to go to the ED. Or you may be told to go to an urgent care center.  Follow your doctors advice.

## 2021-07-19 ENCOUNTER — OFFICE VISIT (OUTPATIENT)
Dept: FAMILY MEDICINE | Facility: CLINIC | Age: 46
End: 2021-07-19
Payer: MEDICARE

## 2021-07-19 VITALS
BODY MASS INDEX: 29.45 KG/M2 | WEIGHT: 146.1 LBS | SYSTOLIC BLOOD PRESSURE: 116 MMHG | HEIGHT: 59 IN | HEART RATE: 78 BPM | OXYGEN SATURATION: 98 % | DIASTOLIC BLOOD PRESSURE: 78 MMHG

## 2021-07-19 DIAGNOSIS — E11.9 TYPE 2 DIABETES MELLITUS WITHOUT COMPLICATION, WITHOUT LONG-TERM CURRENT USE OF INSULIN (H): ICD-10-CM

## 2021-07-19 DIAGNOSIS — Z12.31 ENCOUNTER FOR SCREENING MAMMOGRAM FOR BREAST CANCER: ICD-10-CM

## 2021-07-19 DIAGNOSIS — Z00.00 ROUTINE GENERAL MEDICAL EXAMINATION AT A HEALTH CARE FACILITY: Primary | ICD-10-CM

## 2021-07-19 DIAGNOSIS — N39.0 RECURRENT UTI: ICD-10-CM

## 2021-07-19 LAB
ALBUMIN SERPL-MCNC: 3.9 G/DL (ref 3.5–5)
ALP SERPL-CCNC: 70 U/L (ref 45–120)
ALT SERPL W P-5'-P-CCNC: 25 U/L (ref 0–45)
ANION GAP SERPL CALCULATED.3IONS-SCNC: 11 MMOL/L (ref 5–18)
AST SERPL W P-5'-P-CCNC: 21 U/L (ref 0–40)
BILIRUB SERPL-MCNC: 0.6 MG/DL (ref 0–1)
BUN SERPL-MCNC: 15 MG/DL (ref 8–22)
CALCIUM SERPL-MCNC: 9.1 MG/DL (ref 8.5–10.5)
CHLORIDE BLD-SCNC: 103 MMOL/L (ref 98–107)
CO2 SERPL-SCNC: 27 MMOL/L (ref 22–31)
CREAT SERPL-MCNC: 0.68 MG/DL (ref 0.6–1.1)
GFR SERPL CREATININE-BSD FRML MDRD: >90 ML/MIN/1.73M2
GLUCOSE BLD-MCNC: 166 MG/DL (ref 70–125)
HBA1C MFR BLD: 6.7 % (ref 0–5.6)
POTASSIUM BLD-SCNC: 4.2 MMOL/L (ref 3.5–5)
PROT SERPL-MCNC: 7 G/DL (ref 6–8)
SODIUM SERPL-SCNC: 141 MMOL/L (ref 136–145)

## 2021-07-19 PROCEDURE — 99213 OFFICE O/P EST LOW 20 MIN: CPT | Mod: 25 | Performed by: FAMILY MEDICINE

## 2021-07-19 PROCEDURE — 80053 COMPREHEN METABOLIC PANEL: CPT | Performed by: FAMILY MEDICINE

## 2021-07-19 PROCEDURE — 87088 URINE BACTERIA CULTURE: CPT | Performed by: FAMILY MEDICINE

## 2021-07-19 PROCEDURE — 87086 URINE CULTURE/COLONY COUNT: CPT | Performed by: FAMILY MEDICINE

## 2021-07-19 PROCEDURE — 36415 COLL VENOUS BLD VENIPUNCTURE: CPT | Performed by: FAMILY MEDICINE

## 2021-07-19 PROCEDURE — 99396 PREV VISIT EST AGE 40-64: CPT | Performed by: FAMILY MEDICINE

## 2021-07-19 PROCEDURE — 83036 HEMOGLOBIN GLYCOSYLATED A1C: CPT | Performed by: FAMILY MEDICINE

## 2021-07-19 ASSESSMENT — MIFFLIN-ST. JEOR: SCORE: 1218.34

## 2021-07-19 NOTE — LETTER
July 20, 2021      Chioma Mccormick  [address]  [place] [address]        Dear ,    We are writing to inform you of your test results.    {results letter list:758924}    Resulted Orders   Hemoglobin A1c   Result Value Ref Range    Hemoglobin A1C 6.7 (H) 0.0 - 5.6 %      Comment:      Normal <5.7%   Prediabetes 5.7-6.4%    Diabetes 6.5% or higher     Note: Adopted from ADA consensus guidelines.   Comprehensive metabolic panel (BMP + Alb, Alk Phos, ALT, AST, Total. Bili, TP)   Result Value Ref Range    Sodium 141 136 - 145 mmol/L    Potassium 4.2 3.5 - 5.0 mmol/L    Chloride 103 98 - 107 mmol/L    Carbon Dioxide (CO2) 27 22 - 31 mmol/L    Anion Gap 11 5 - 18 mmol/L    Urea Nitrogen 15 8 - 22 mg/dL    Creatinine 0.68 0.60 - 1.10 mg/dL    Calcium 9.1 8.5 - 10.5 mg/dL    Glucose 166 (H) 70 - 125 mg/dL    Alkaline Phosphatase 70 45 - 120 U/L    AST 21 0 - 40 U/L    ALT 25 0 - 45 U/L    Protein Total 7.0 6.0 - 8.0 g/dL    Albumin 3.9 3.5 - 5.0 g/dL    Bilirubin Total 0.6 0.0 - 1.0 mg/dL    GFR Estimate >90 >60 mL/min/1.73m2      Comment:      As of July 11, 2021, eGFR is calculated by the CKD-EPI creatinine equation, without race adjustment. eGFR can be influenced by muscle mass, exercise, and diet. The reported eGFR is an estimation only and is only applicable if the renal function is stable.       If you have any questions or concerns, please call the clinic at the number listed above.       Sincerely,      Saud Jama MD

## 2021-07-19 NOTE — PROGRESS NOTES
SUBJECTIVE:   CC: Chioma Mccormick is an 46 year old woman who presents for preventive health visit.       Patient has been advised of split billing requirements and indicates understanding: Yes  HPI  Ability to successfully perform activities of daily living: No, needs assistance with: telephone use, transportation, shopping, preparing meals, housework, laundry and money management  Home safety:  none identified   Hearing impairment: Yes, Difficulty following a conversation in a noisy restaurant or crowded room.    Today's PHQ-2 Score: No flowsheet data found.    Abuse: Current or Past (Physical, Sexual or Emotional) - No  Do you feel safe in your environment? Yes        Social History     Tobacco Use     Smoking status: Never Smoker     Smokeless tobacco: Never Used   Substance Use Topics     Alcohol use: No     If you drink alcohol do you typically have >3 drinks per day or >7 drinks per week? No    Alcohol Use 7/19/2021   Prescreen: >3 drinks/day or >7 drinks/week? No   No flowsheet data found.    Reviewed orders with patient.  Reviewed health maintenance and updated orders accordingly - Yes  Lab work is in process    Breast Cancer Screening:  Any new diagnosis of family breast, ovarian, or bowel cancer? No    FHS-7: No flowsheet data found.  click delete button to remove this line now  Mammogram Screening - Offered annual screening and updated Health Maintenance for mutual plan based on risk factor consideration    Pertinent mammograms are reviewed under the imaging tab.    History of abnormal Pap smear: YES - updated in Problem List and Health Maintenance accordingly     Reviewed and updated as needed this visit by clinical staff  Tobacco  Allergies  Meds  Problems             Reviewed and updated as needed this visit by Provider     Problems            No past medical history on file.   No past surgical history on file.    Review of Systems  CONSTITUTIONAL: NEGATIVE for fever, chills, change in  "weight  INTEGUMENTARU/SKIN: NEGATIVE for worrisome rashes, moles or lesions  EYES: NEGATIVE for vision changes or irritation  ENT: NEGATIVE for ear, mouth and throat problems  RESP: NEGATIVE for significant cough or SOB  BREAST: NEGATIVE for masses, tenderness or discharge  CV: NEGATIVE for chest pain, palpitations or peripheral edema  GI: NEGATIVE for nausea, abdominal pain, heartburn, or change in bowel habits  : NEGATIVE for unusual urinary or vaginal symptoms. Periods are regular.  MUSCULOSKELETAL: NEGATIVE for significant arthralgias or myalgia  NEURO: NEGATIVE for weakness, dizziness or paresthesias  PSYCHIATRIC: NEGATIVE for changes in mood or affect     OBJECTIVE:   /78 (BP Location: Left arm, Patient Position: Sitting, Cuff Size: Adult Regular)   Pulse 78   Ht 1.499 m (4' 11\")   Wt 66.3 kg (146 lb 1.6 oz)   SpO2 98%   BMI 29.51 kg/m    Physical Exam  GENERAL: healthy, alert and no distress  EYES: Eyes grossly normal to inspection, PERRL and conjunctivae and sclerae normal  HENT: ear canals and TM's normal, nose and mouth without ulcers or lesions  NECK: no adenopathy, no asymmetry, masses, or scars and thyroid normal to palpation  RESP: lungs clear to auscultation - no rales, rhonchi or wheezes  BREAST: normal without masses, tenderness or nipple discharge and no palpable axillary masses or adenopathy  CV: regular rate and rhythm, normal S1 S2, no S3 or S4, no murmur, click or rub, no peripheral edema and peripheral pulses strong  ABDOMEN: soft, nontender, no hepatosplenomegaly, no masses and bowel sounds normal  MS: no gross musculoskeletal defects noted, no edema  SKIN: no suspicious lesions or rashes  NEURO: Normal strength and tone, mentation intact and speech normal  PSYCH: mentation appears normal, affect normal/bright    ASSESSMENT/PLAN:   Chioma was seen today for physical.    Diagnoses and all orders for this visit:    Routine general medical examination at a health care facility  We " "discussed healthy lifestyle, nutrition, cardiovascular risk reduction, self care, safety, sunscreen, and timing of cancer screening.  Health maintenance screening and immunizations reviewed with the patient.  Follow up yearly for the annual physical.     Type 2 diabetes mellitus without complication, without long-term current use of insulin (H)  -     Hemoglobin A1c  -     Comprehensive metabolic panel (BMP + Alb, Alk Phos, ALT, AST, Total. Bili, TP)  Continue with metformin 2000mg  Counseled healthy lifestyle modifications  Eye exam September 2020  LDL 31, on simvastatin 10mg  F/u every 3 months    Encounter for screening mammogram for breast cancer  -     *MA Screening Digital Bilateral; Future    Recurrent UTI  -     Urine Culture Aerobic Bacterial - lab collect      Patient has been advised of split billing requirements and indicates understanding: Yes  COUNSELING:  Reviewed preventive health counseling, as reflected in patient instructions       Regular exercise       Healthy diet/nutrition    Estimated body mass index is 29.51 kg/m  as calculated from the following:    Height as of this encounter: 1.499 m (4' 11\").    Weight as of this encounter: 66.3 kg (146 lb 1.6 oz).    Weight management plan: diet, exercise    She reports that she has never smoked. She has never used smokeless tobacco.      Counseling Resources:  ATP IV Guidelines  Pooled Cohorts Equation Calculator  Breast Cancer Risk Calculator  BRCA-Related Cancer Risk Assessment: FHS-7 Tool  FRAX Risk Assessment  ICSI Preventive Guidelines  Dietary Guidelines for Americans, 2010  USDA's MyPlate  ASA Prophylaxis  Lung CA Screening    Saud Jama MD  [facility]  "

## 2021-07-19 NOTE — PROGRESS NOTES
SUBJECTIVE:   CC: Chioma Mccormick is an 46 year old woman who presents for preventive health visit.     {Split Bill scripting  The purpose of this visit is to discuss your medical history and prevent health problems before you are sick. You may be responsible for a co-pay, coinsurance, or deductible if your visit today includes services such as checking on a sore throat, having an x-ray or lab test, or treating and evaluating a new or existing condition :706946}  Patient has been advised of split billing requirements and indicates understanding: Yes  Healthy Habits:    Do you get at least three servings of calcium containing foods daily (dairy, green leafy vegetables, etc.)? yes    Amount of exercise or daily activities, outside of work: 7 day(s) per week    Problems taking medications regularly Yes     Medication side effects: No    Have you had an eye exam in the past two years? yes    Do you see a dentist twice per year? yes    Do you have sleep apnea, excessive snoring or daytime drowsiness?no    Today's PHQ-2 Score: No flowsheet data found.    Abuse: Current or Past(Physical, Sexual or Emotional)- No  Do you feel safe in your environment? Yes        Social History     Tobacco Use     Smoking status: Never Smoker     Smokeless tobacco: Never Used   Substance Use Topics     Alcohol use: No     If you drink alcohol do you typically have >3 drinks per day or >7 drinks per week? No                     Reviewed orders with patient.  Reviewed health maintenance and updated orders accordingly - Yes  Lab work is in process    FHS-7: No flowsheet data found.  click delete button to remove this line now  Mammogram Screening - Offered annual screening and updated Health Maintenance for mutual plan based on risk factor consideration    Pertinent mammograms are reviewed under the imaging tab.    Pertinent mammograms are reviewed under the imaging tab.  History of abnormal Pap smear: YES - updated in Problem List and  "Health Maintenance accordingly     Reviewed and updated as needed this visit by clinical staff  Tobacco  Allergies  Meds  Problems             Reviewed and updated as needed this visit by Provider     Problems            No past medical history on file.   No past surgical history on file.    ROS:  CONSTITUTIONAL: NEGATIVE for fever, chills, change in weight  INTEGUMENTARU/SKIN: NEGATIVE for worrisome rashes, moles or lesions  EYES: NEGATIVE for vision changes or irritation  ENT: NEGATIVE for ear, mouth and throat problems  RESP: NEGATIVE for significant cough or SOB  BREAST: NEGATIVE for masses, tenderness or discharge  CV: NEGATIVE for chest pain, palpitations or peripheral edema  GI: NEGATIVE for nausea, abdominal pain, heartburn, or change in bowel habits  : NEGATIVE for unusual urinary or vaginal symptoms. Periods are regular.  MUSCULOSKELETAL: NEGATIVE for significant arthralgias or myalgia  NEURO: NEGATIVE for weakness, dizziness or paresthesias  PSYCHIATRIC: NEGATIVE for changes in mood or affect    OBJECTIVE:   /78 (BP Location: Left arm, Patient Position: Sitting, Cuff Size: Adult Regular)   Pulse 78   Ht 1.499 m (4' 11\")   Wt 66.3 kg (146 lb 1.6 oz)   SpO2 98%   BMI 29.51 kg/m    EXAM:  GENERAL: healthy, alert and no distress  EYES: Eyes grossly normal to inspection, PERRL and conjunctivae and sclerae normal  HENT: ear canals and TM's normal, nose and mouth without ulcers or lesions  NECK: no adenopathy, no asymmetry, masses, or scars and thyroid normal to palpation  RESP: lungs clear to auscultation - no rales, rhonchi or wheezes  BREAST: normal without masses, tenderness or nipple discharge and no palpable axillary masses or adenopathy  CV: regular rate and rhythm, normal S1 S2, no S3 or S4, no murmur, click or rub, no peripheral edema and peripheral pulses strong  ABDOMEN: soft, nontender, no hepatosplenomegaly, no masses and bowel sounds normal  MS: no gross musculoskeletal defects " "noted, no edema  SKIN: no suspicious lesions or rashes  NEURO: Normal strength and tone, mentation intact and speech normal  PSYCH: mentation appears normal, affect normal/bright    none     ASSESSMENT/PLAN:   Chioma was seen today for physical.    Diagnoses and all orders for this visit:    Diabetes (H)    Routine general medical examination at a health care facility    Encounter for screening mammogram for breast cancer  -     *MA Screening Digital Bilateral; Future    Recurrent UTI  -     Urine Culture Aerobic Bacterial - lab collect; Future    Type 2 diabetes mellitus without complication, without long-term current use of insulin (H)  -     Hemoglobin A1c; Future  -     Comprehensive metabolic panel (BMP + Alb, Alk Phos, ALT, AST, Total. Bili, TP); Future        Patient has been advised of split billing requirements and indicates understanding: Yes  COUNSELING:   Reviewed preventive health counseling, as reflected in patient instructions       Regular exercise       Healthy diet/nutrition       Vision screening    Estimated body mass index is 29.51 kg/m  as calculated from the following:    Height as of this encounter: 1.499 m (4' 11\").    Weight as of this encounter: 66.3 kg (146 lb 1.6 oz).    Weight management plan: Discussed healthy diet and exercise guidelines    She reports that she has never smoked. She has never used smokeless tobacco.      Counseling Resources:  ATP IV Guidelines  Pooled Cohorts Equation Calculator  Breast Cancer Risk Calculator  BRCA-Related Cancer Risk Assessment: FHS-7 Tool  FRAX Risk Assessment  ICSI Preventive Guidelines  Dietary Guidelines for Americans, 2010  USDA's MyPlate  ASA Prophylaxis  Lung CA Screening    Saud Jama MD  [facility]  Assessment & Plan     Diabetes (H)  ***  - Hemoglobin A1c; Future    Routine general medical examination at a health care facility  ***      {[facility] 2021 Documentation (Optional):459106}  {2021 E&M " "time (Optional):731095}  {Provider  Link to [facility] Help Grid :799456}     BMI:   Estimated body mass index is 29.51 kg/m  as calculated from the following:    Height as of this encounter: 1.499 m (4' 11\").    Weight as of this encounter: 66.3 kg (146 lb 1.6 oz).   {Weight Management Plan needed for ACO:926688}    {FOLLOW UP PLANS (Optional):384663}    No follow-ups on file.    Saud Jama MD  [facility]    Subjective     Chioma Mccormick is a 46 year old female who presents to clinic today for the following health issues accompanied by her {accompanied to visit:645956}:    HPI     {SUPERLIST (Optional):092284}  {additonal problems for provider to add (Optional):666152}    Review of Systems   {ROS COMP (Optional):929842}      Objective    /78 (BP Location: Left arm, Patient Position: Sitting, Cuff Size: Adult Regular)   Pulse 78   Ht 1.499 m (4' 11\")   Wt 66.3 kg (146 lb 1.6 oz)   SpO2 98%   BMI 29.51 kg/m    Body mass index is 29.51 kg/m .  Physical Exam   {Exam List (Optional):927463}    {Diagnostic Test Results (Optional):089669}       SUBJECTIVE:   CC: Chioma Mccormick is an 46 year old woman who presents for preventive health visit.     {Split Bill scripting  The purpose of this visit is to discuss your medical history and prevent health problems before you are sick. You may be responsible for a co-pay, coinsurance, or deductible if your visit today includes services such as checking on a sore throat, having an x-ray or lab test, or treating and evaluating a new or existing condition :205608}  Patient has been advised of split billing requirements and indicates understanding: {Yes and No:264154}  Healthy Habits:    Do you get at least three servings of calcium containing foods daily (dairy, green leafy vegetables, etc.)? { :855091::\"yes\"}    Amount of exercise or daily activities, outside of work: { :620265}    Problems taking medications regularly { " ":245932::\"No\"}    Medication side effects: { :644348::\"No\"}    Have you had an eye exam in the past two years? { :933450}    Do you see a dentist twice per year? { :109063}    Do you have sleep apnea, excessive snoring or daytime drowsiness?{ :081198}  {Outside tests to abstract? :884513}    {additional problems to add (Optional):657353}    Today's PHQ-2 Score: No flowsheet data found.  {PHQ-2 LOOK IN ASSESSMENTS (Optional) :386992}  Abuse: Current or Past(Physical, Sexual or Emotional)- {YES/NO/NA:704566}  Do you feel safe in your environment? {YES/NO/NA:249300}        Social History     Tobacco Use     Smoking status: Never Smoker     Smokeless tobacco: Never Used   Substance Use Topics     Alcohol use: No     If you drink alcohol do you typically have >3 drinks per day or >7 drinks per week? {ETOH :081793}                     Reviewed orders with patient.  Reviewed health maintenance and updated orders accordingly - {Yes/No:629664::\"Yes\"}  {Chronicprobdata (Optional):324159}    FHS-7: No flowsheet data found.  {If any of the questions to the BCRA (FHS-7) are answered yes, consider ordering referral for genetic counseling (Optional) :655314::\"click delete button to remove this line now\"}  {AMB Mammogram Decision Support (Optional) :355167}  Pertinent mammograms are reviewed under the imaging tab.    Pertinent mammograms are reviewed under the imaging tab.  History of abnormal Pap smear: {PAP HX:400481}     Reviewed and updated as needed this visit by clinical staff  Tobacco  Allergies  Meds  Problems             Reviewed and updated as needed this visit by Provider     Problems            {HISTORY OPTIONS (Optional):540854}    ROS:  { :300366}    OBJECTIVE:   /78 (BP Location: Left arm, Patient Position: Sitting, Cuff Size: Adult Regular)   Pulse 78   Ht 1.499 m (4' 11\")   Wt 66.3 kg (146 lb 1.6 oz)   SpO2 98%   BMI 29.51 kg/m    EXAM:  {Exam Choices:411426}    {Diagnostic Test Results " "(Optional):872861::\"Diagnostic Test Results:\",\"Labs reviewed in Epic\"}    ASSESSMENT/PLAN:   {Diag Picklist:496744}    Patient has been advised of split billing requirements and indicates understanding: {YES / NO:191249::\"Yes\"}  COUNSELING:   {FEMALE COUNSELING MESSAGES:183464::\"Reviewed preventive health counseling, as reflected in patient instructions\"}    Estimated body mass index is 29.51 kg/m  as calculated from the following:    Height as of this encounter: 1.499 m (4' 11\").    Weight as of this encounter: 66.3 kg (146 lb 1.6 oz).    {Weight Management Plan (ACO) Complete if BMI is abnormal-  Ages 18-64  BMI >24.9.  Age 65+ with BMI <23 or >30 (Optional):889404}    She reports that she has never smoked. She has never used smokeless tobacco.      Counseling Resources:  ATP IV Guidelines  Pooled Cohorts Equation Calculator  Breast Cancer Risk Calculator  BRCA-Related Cancer Risk Assessment: FHS-7 Tool  FRAX Risk Assessment  ICSI Preventive Guidelines  Dietary Guidelines for Americans, 2010  USDA's MyPlate  ASA Prophylaxis  Lung CA Screening    Saud Jama MD  [facility]"

## 2021-07-21 DIAGNOSIS — N30.00 ACUTE CYSTITIS WITHOUT HEMATURIA: Primary | ICD-10-CM

## 2021-07-21 LAB — BACTERIA UR CULT: ABNORMAL

## 2021-07-21 RX ORDER — AMOXICILLIN 500 MG/1
500 CAPSULE ORAL 2 TIMES DAILY
Qty: 20 CAPSULE | Refills: 0 | Status: SHIPPED | OUTPATIENT
Start: 2021-07-21 | End: 2021-07-31

## 2021-08-01 ENCOUNTER — TRANSFERRED RECORDS (OUTPATIENT)
Dept: HEALTH INFORMATION MANAGEMENT | Facility: CLINIC | Age: 46
End: 2021-08-01

## 2021-08-01 LAB — RETINOPATHY: NORMAL

## 2021-08-03 ENCOUNTER — TELEPHONE (OUTPATIENT)
Dept: FAMILY MEDICINE | Facility: CLINIC | Age: 46
End: 2021-08-03

## 2021-08-03 DIAGNOSIS — N30.00 ACUTE CYSTITIS WITHOUT HEMATURIA: Primary | ICD-10-CM

## 2021-08-03 NOTE — TELEPHONE ENCOUNTER
Reason for Call: Request for an order or referral:    Order or referral being requested: UA    Date needed: as soon as possible    Has the patient been seen by the PCP for this problem? YES    Additional comments: Patient just finished treatment for a UTI and Zuri is requesting orders for a UA to make sure it went away.      Phone number Patient can be reached at:  Cell number on file:    Telephone Information:   Mobile [phone]       Best Time:  anytime    Can we leave a detailed message on this number?  YES    Call taken on 8/3/2021 at 11:58 AM by Tamara Hare

## 2021-08-03 NOTE — TELEPHONE ENCOUNTER
Last seen by Marjorie 7/19.  Please advise on lab only UA or need for a follow up. TICO LOPEZ on 8/3/2021 at 2:10 PM

## 2021-08-05 ENCOUNTER — LAB (OUTPATIENT)
Dept: LAB | Facility: CLINIC | Age: 46
End: 2021-08-05
Payer: MEDICARE

## 2021-08-05 DIAGNOSIS — N30.00 ACUTE CYSTITIS WITHOUT HEMATURIA: ICD-10-CM

## 2021-08-05 LAB
ALBUMIN UR-MCNC: NEGATIVE MG/DL
APPEARANCE UR: ABNORMAL
BACTERIA #/AREA URNS HPF: ABNORMAL /HPF
BILIRUB UR QL STRIP: NEGATIVE
COLOR UR AUTO: YELLOW
GLUCOSE UR STRIP-MCNC: NEGATIVE MG/DL
HGB UR QL STRIP: ABNORMAL
KETONES UR STRIP-MCNC: NEGATIVE MG/DL
LEUKOCYTE ESTERASE UR QL STRIP: ABNORMAL
NITRATE UR QL: NEGATIVE
PH UR STRIP: 5.5 [PH] (ref 5–8)
RBC #/AREA URNS AUTO: ABNORMAL /HPF
SP GR UR STRIP: 1.02 (ref 1–1.03)
SQUAMOUS #/AREA URNS AUTO: ABNORMAL /LPF
UROBILINOGEN UR STRIP-ACNC: 0.2 E.U./DL
WBC #/AREA URNS AUTO: ABNORMAL /HPF

## 2021-08-05 PROCEDURE — 87086 URINE CULTURE/COLONY COUNT: CPT

## 2021-08-05 PROCEDURE — 87186 SC STD MICRODIL/AGAR DIL: CPT

## 2021-08-05 PROCEDURE — 81001 URINALYSIS AUTO W/SCOPE: CPT

## 2021-08-05 PROCEDURE — 87088 URINE BACTERIA CULTURE: CPT

## 2021-08-08 LAB — BACTERIA UR CULT: ABNORMAL

## 2021-08-09 DIAGNOSIS — N30.00 ACUTE CYSTITIS WITHOUT HEMATURIA: Primary | ICD-10-CM

## 2021-08-09 RX ORDER — SULFAMETHOXAZOLE/TRIMETHOPRIM 800-160 MG
1 TABLET ORAL 2 TIMES DAILY
Qty: 14 TABLET | Refills: 0 | Status: SHIPPED | OUTPATIENT
Start: 2021-08-09 | End: 2021-11-03

## 2021-08-24 ENCOUNTER — TELEPHONE (OUTPATIENT)
Dept: FAMILY MEDICINE | Facility: CLINIC | Age: 46
End: 2021-08-24

## 2021-08-24 DIAGNOSIS — R30.0 DYSURIA: Primary | ICD-10-CM

## 2021-08-26 ENCOUNTER — LAB (OUTPATIENT)
Dept: LAB | Facility: CLINIC | Age: 46
End: 2021-08-26
Payer: MEDICARE

## 2021-08-26 DIAGNOSIS — R30.0 DYSURIA: ICD-10-CM

## 2021-08-26 PROCEDURE — 87086 URINE CULTURE/COLONY COUNT: CPT

## 2021-08-26 PROCEDURE — 87186 SC STD MICRODIL/AGAR DIL: CPT

## 2021-08-27 ENCOUNTER — HOSPITAL ENCOUNTER (OUTPATIENT)
Dept: MAMMOGRAPHY | Facility: CLINIC | Age: 46
Discharge: HOME | End: 2021-08-27
Attending: FAMILY MEDICINE | Admitting: FAMILY MEDICINE
Payer: MEDICARE

## 2021-08-27 ENCOUNTER — TELEPHONE (OUTPATIENT)
Dept: FAMILY MEDICINE | Facility: CLINIC | Age: 46
End: 2021-08-27

## 2021-08-27 DIAGNOSIS — N30.00 ACUTE CYSTITIS WITHOUT HEMATURIA: Primary | ICD-10-CM

## 2021-08-27 DIAGNOSIS — Z12.31 ENCOUNTER FOR SCREENING MAMMOGRAM FOR BREAST CANCER: ICD-10-CM

## 2021-08-27 PROCEDURE — 77067 SCR MAMMO BI INCL CAD: CPT

## 2021-08-28 LAB — BACTERIA UR CULT: ABNORMAL

## 2021-08-30 RX ORDER — NITROFURANTOIN 25; 75 MG/1; MG/1
100 CAPSULE ORAL 2 TIMES DAILY
Qty: 10 CAPSULE | Refills: 0 | Status: SHIPPED | OUTPATIENT
Start: 2021-08-30 | End: 2021-11-03

## 2021-08-30 NOTE — TELEPHONE ENCOUNTER
Please inform patient (and Zuri her PCA) that she has a UTI.  I sent RX to her pharmacy. Thank you

## 2021-09-01 ENCOUNTER — TELEPHONE (OUTPATIENT)
Dept: FAMILY MEDICINE | Facility: CLINIC | Age: 46
End: 2021-09-01

## 2021-09-01 DIAGNOSIS — N39.0 RECURRENT UTI: Primary | ICD-10-CM

## 2021-09-01 NOTE — TELEPHONE ENCOUNTER
9-1-21  Reason for Call:  Request for results:    Name of test or procedure: UA    Date of test of procedure: 8-26-21    Location of the test or procedure: [facility] to leave the result message on voice mail or with a family member? Yes    Phone number Patient can be reached at:  Cell number on file:    Telephone Information:   Mobile [phone]       Additional comments: Zuri received the my-chart UA results but wants someone to call her back to get a better understanding of them    Call taken on 9/1/2021 at 8:54 AM by Edelmira Garcia

## 2021-09-07 RX ORDER — NITROFURANTOIN MACROCRYSTALS 50 MG/1
50 CAPSULE ORAL 4 TIMES DAILY
Qty: 90 CAPSULE | Refills: 3 | Status: SHIPPED | OUTPATIENT
Start: 2021-09-07 | End: 2021-11-03

## 2021-10-26 DIAGNOSIS — E11.9 DIABETES (H): ICD-10-CM

## 2021-10-26 NOTE — TELEPHONE ENCOUNTER
Incoming request from pharmacy for Metformin HCL 1000mg BID. Patient is taking Metformin 1000mg BID but needs new script. Is PCP deems this fit, please write up new script to [facility] on [address].

## 2021-11-03 ENCOUNTER — OFFICE VISIT (OUTPATIENT)
Dept: FAMILY MEDICINE | Facility: CLINIC | Age: 46
End: 2021-11-03
Payer: MEDICARE

## 2021-11-03 VITALS
SYSTOLIC BLOOD PRESSURE: 122 MMHG | WEIGHT: 149.56 LBS | HEART RATE: 65 BPM | BODY MASS INDEX: 30.21 KG/M2 | OXYGEN SATURATION: 97 % | DIASTOLIC BLOOD PRESSURE: 74 MMHG

## 2021-11-03 DIAGNOSIS — N39.0 RECURRENT UTI: ICD-10-CM

## 2021-11-03 DIAGNOSIS — E11.9 TYPE 2 DIABETES MELLITUS WITHOUT COMPLICATION, WITHOUT LONG-TERM CURRENT USE OF INSULIN (H): ICD-10-CM

## 2021-11-03 DIAGNOSIS — Z23 HIGH PRIORITY FOR 2019-NCOV VACCINE: ICD-10-CM

## 2021-11-03 DIAGNOSIS — R35.0 FREQUENT URINATION: Primary | ICD-10-CM

## 2021-11-03 LAB
ALBUMIN UR-MCNC: NEGATIVE MG/DL
APPEARANCE UR: CLEAR
BILIRUB UR QL STRIP: NEGATIVE
COLOR UR AUTO: YELLOW
CREAT UR-MCNC: 15 MG/DL
GLUCOSE UR STRIP-MCNC: NEGATIVE MG/DL
HBA1C MFR BLD: 6.8 % (ref 0–5.6)
HGB UR QL STRIP: NEGATIVE
KETONES UR STRIP-MCNC: NEGATIVE MG/DL
LEUKOCYTE ESTERASE UR QL STRIP: NEGATIVE
MICROALBUMIN UR-MCNC: <0.5 MG/DL (ref 0–1.99)
MICROALBUMIN/CREAT UR: NORMAL MG/G{CREAT}
NITRATE UR QL: NEGATIVE
PH UR STRIP: 7 [PH] (ref 5–8)
SP GR UR STRIP: 1.01 (ref 1–1.03)
UROBILINOGEN UR STRIP-ACNC: 0.2 E.U./DL

## 2021-11-03 PROCEDURE — 0004A COVID-19,PF,PFIZER (12+ YRS): CPT | Performed by: FAMILY MEDICINE

## 2021-11-03 PROCEDURE — 99214 OFFICE O/P EST MOD 30 MIN: CPT | Mod: 25 | Performed by: FAMILY MEDICINE

## 2021-11-03 PROCEDURE — 81003 URINALYSIS AUTO W/O SCOPE: CPT | Performed by: FAMILY MEDICINE

## 2021-11-03 PROCEDURE — 36415 COLL VENOUS BLD VENIPUNCTURE: CPT | Performed by: FAMILY MEDICINE

## 2021-11-03 PROCEDURE — 91300 COVID-19,PF,PFIZER (12+ YRS): CPT | Performed by: FAMILY MEDICINE

## 2021-11-03 PROCEDURE — 83036 HEMOGLOBIN GLYCOSYLATED A1C: CPT | Performed by: FAMILY MEDICINE

## 2021-11-03 PROCEDURE — 82043 UR ALBUMIN QUANTITATIVE: CPT | Performed by: FAMILY MEDICINE

## 2021-11-03 RX ORDER — NITROFURANTOIN MACROCRYSTALS 50 MG/1
50 CAPSULE ORAL DAILY
Qty: 90 CAPSULE | Refills: 3
Start: 2021-11-03 | End: 2022-04-04

## 2021-11-03 NOTE — PROGRESS NOTES
Chioma was seen today for diabetes, urine and imm/inj.    Diagnoses and all orders for this visit:    Type 2 diabetes mellitus without complication, without long-term current use of insulin (H)  -     Hemoglobin A1c; Future  -     Hemoglobin A1c  -     Albumin Random Urine Quantitative with Creat Ratio; Future  -     metFORMIN (GLUCOPHAGE) 1000 MG tablet; Take 1 tablet (1,000 mg) by mouth daily (with dinner)  -     Albumin Random Urine Quantitative with Creat Ratio    A1c=6.8  Meds: metformin 1000mg  Microalbumin pending  On statin  Not a smoker  Counseled healthy lifestyle modifications  Discussed general issues about diabetes pathophysiology and management.  Addressed ADA diet.  Suggested low cholesterol diet.  Encouraged aerobic exercise.  Discussed foot care.  Reminded to get yearly retinal exam.  Discussed ways to avoid symptomatic hypoglycemia.  F/u in 3 months    Recurrent UTI  -     nitroFURantoin macrocrystal (MACRODANTIN) 50 MG capsule; Take 1 capsule (50 mg) by mouth daily  -     **UA reflex to Microscopic FUTURE 2mo; Future  -     **UA reflex to Microscopic FUTURE 2mo    High priority for 2019-nCoV vaccine  -     COVID-19,PF,PFIZER (12+ Yrs)    Subjective:   Chioma Mccormick is a 46 year old female who presents for follow up of diabetes. Evaluation to date has been via hemoglobin A1C.    The following portions of the patient's history were reviewed and updated as appropriate: allergies, current medications, past family history, past medical history, past social history, past surgical history and problem list.    Review of Systems  A 12 point comprehensive review of systems was negative except as noted.     Current Outpatient Medications   Medication Sig Dispense Refill     metFORMIN (GLUCOPHAGE) 1000 MG tablet Take 1 tablet (1,000 mg) by mouth daily (with dinner) 90 tablet 3     nitroFURantoin macrocrystal (MACRODANTIN) 50 MG capsule Take 1 capsule (50 mg) by mouth daily 90 capsule 3     blood glucose  test (CONTOUR TEST STRIPS) strips [BLOOD GLUCOSE TEST (CONTOUR TEST STRIPS) STRIPS] Check blood sugar once daily.  Dispense brand per patient's insurance at pharmacy discretion. 200 strip 3     calcium-vitamin D (CALCIUM-VITAMIN D) 500 mg(1,250mg) -200 unit per tablet [CALCIUM-VITAMIN D (CALCIUM-VITAMIN D) 500 MG(1,250MG) -200 UNIT PER TABLET] TAKE 1 TABLET BY MOUTH TWICE A DAY WITH MEALS 60 tablet 11     ciclopirox (PENLAC) 8 % solution [CICLOPIROX (PENLAC) 8 % SOLUTION] Apply over nail and surrounding skin. Apply daily over previous coat. After seven (7) days, may remove with alcohol and continue cycle. 6.6 mL 0     generic lancets (MICROLET LANCET) [GENERIC LANCETS (MICROLET LANCET)] TEST ONCE DAILY AS DIRECTED 200 each 3     simvastatin (ZOCOR) 10 MG tablet [SIMVASTATIN (ZOCOR) 10 MG TABLET] Take 1 tablet (10 mg total) by mouth at bedtime. 90 tablet 3          Objective:    Physical Exam   /74 (BP Location: Left arm, Patient Position: Sitting, Cuff Size: Adult Regular)   Pulse 65   Wt 67.8 kg (149 lb 9 oz)   LMP 10/25/2021   SpO2 97%   BMI 30.21 kg/m     Constitutional: Patient is oriented to person, place, and time. Patient appears well-developed and well-nourished. No distress.   Head: Normocephalic and atraumatic.   Right Ear: External ear normal.   Left Ear: External ear normal.   Eyes: Conjunctivae and EOM are normal. Pupils are equal, round, and reactive to light. Right eye exhibits no discharge. Left eye exhibits no discharge. No scleral icterus.   Neck: Neck supple. No JVD present. No tracheal deviation present. No thyromegaly present.   Cardiovascular: Normal rate, regular rhythm, normal heart sounds and intact distal pulses. No murmur heard.   Pulmonary/Chest: Effort normal and breath sounds normal. No stridor. No respiratory distress. Patient has no wheezes, no rales, exhibits no tenderness.   Skin: Skin is warm and dry. No rash noted. Patient is not diaphoretic. No erythema. No pallor.

## 2021-11-03 NOTE — PATIENT INSTRUCTIONS
1.  Continue to metformin 1000mg tablet, 1 tablet once daily    2.  Continue with simvastatin 10mg tablet, 1 tablet once daily    3.  Reduce nitrofurantoin 50mg capsule, 1 capsule daily

## 2022-01-24 ENCOUNTER — TRANSFERRED RECORDS (OUTPATIENT)
Dept: HEALTH INFORMATION MANAGEMENT | Facility: CLINIC | Age: 47
End: 2022-01-24
Payer: MEDICARE

## 2022-02-23 ENCOUNTER — OFFICE VISIT (OUTPATIENT)
Dept: FAMILY MEDICINE | Facility: CLINIC | Age: 47
End: 2022-02-23
Payer: MEDICARE

## 2022-02-23 VITALS
SYSTOLIC BLOOD PRESSURE: 122 MMHG | BODY MASS INDEX: 30.54 KG/M2 | OXYGEN SATURATION: 94 % | WEIGHT: 151.19 LBS | HEART RATE: 91 BPM | DIASTOLIC BLOOD PRESSURE: 64 MMHG

## 2022-02-23 DIAGNOSIS — E11.9 TYPE 2 DIABETES MELLITUS WITHOUT COMPLICATION, WITHOUT LONG-TERM CURRENT USE OF INSULIN (H): Primary | ICD-10-CM

## 2022-02-23 DIAGNOSIS — R09.89 RUNNY NOSE: ICD-10-CM

## 2022-02-23 DIAGNOSIS — N39.0 RECURRENT UTI: ICD-10-CM

## 2022-02-23 LAB
ALBUMIN SERPL-MCNC: 4 G/DL (ref 3.5–5)
ALP SERPL-CCNC: 68 U/L (ref 45–120)
ALT SERPL W P-5'-P-CCNC: 27 U/L (ref 0–45)
ANION GAP SERPL CALCULATED.3IONS-SCNC: 11 MMOL/L (ref 5–18)
AST SERPL W P-5'-P-CCNC: 22 U/L (ref 0–40)
BILIRUB SERPL-MCNC: 1 MG/DL (ref 0–1)
BUN SERPL-MCNC: 8 MG/DL (ref 8–22)
CALCIUM SERPL-MCNC: 9.1 MG/DL (ref 8.5–10.5)
CHLORIDE BLD-SCNC: 101 MMOL/L (ref 98–107)
CHOLEST SERPL-MCNC: 103 MG/DL
CO2 SERPL-SCNC: 26 MMOL/L (ref 22–31)
CREAT SERPL-MCNC: 0.75 MG/DL (ref 0.6–1.1)
FASTING STATUS PATIENT QL REPORTED: YES
GFR SERPL CREATININE-BSD FRML MDRD: >90 ML/MIN/1.73M2
GLUCOSE BLD-MCNC: 167 MG/DL (ref 70–125)
HBA1C MFR BLD: 7.4 % (ref 0–5.6)
HDLC SERPL-MCNC: 31 MG/DL
LDLC SERPL CALC-MCNC: 46 MG/DL
POTASSIUM BLD-SCNC: 4.1 MMOL/L (ref 3.5–5)
PROT SERPL-MCNC: 6.8 G/DL (ref 6–8)
SARS-COV-2 RNA RESP QL NAA+PROBE: NEGATIVE
SODIUM SERPL-SCNC: 138 MMOL/L (ref 136–145)
TRIGL SERPL-MCNC: 130 MG/DL

## 2022-02-23 PROCEDURE — 99214 OFFICE O/P EST MOD 30 MIN: CPT | Performed by: FAMILY MEDICINE

## 2022-02-23 PROCEDURE — 80053 COMPREHEN METABOLIC PANEL: CPT | Performed by: FAMILY MEDICINE

## 2022-02-23 PROCEDURE — 87086 URINE CULTURE/COLONY COUNT: CPT | Performed by: FAMILY MEDICINE

## 2022-02-23 PROCEDURE — 80061 LIPID PANEL: CPT | Performed by: FAMILY MEDICINE

## 2022-02-23 PROCEDURE — 36415 COLL VENOUS BLD VENIPUNCTURE: CPT | Performed by: FAMILY MEDICINE

## 2022-02-23 PROCEDURE — U0005 INFEC AGEN DETEC AMPLI PROBE: HCPCS | Performed by: FAMILY MEDICINE

## 2022-02-23 PROCEDURE — U0003 INFECTIOUS AGENT DETECTION BY NUCLEIC ACID (DNA OR RNA); SEVERE ACUTE RESPIRATORY SYNDROME CORONAVIRUS 2 (SARS-COV-2) (CORONAVIRUS DISEASE [COVID-19]), AMPLIFIED PROBE TECHNIQUE, MAKING USE OF HIGH THROUGHPUT TECHNOLOGIES AS DESCRIBED BY CMS-2020-01-R: HCPCS | Performed by: FAMILY MEDICINE

## 2022-02-23 PROCEDURE — 83036 HEMOGLOBIN GLYCOSYLATED A1C: CPT | Performed by: FAMILY MEDICINE

## 2022-02-23 RX ORDER — FLUTICASONE PROPIONATE 50 MCG
1 SPRAY, SUSPENSION (ML) NASAL DAILY
Qty: 16 G | Refills: 0 | Status: SHIPPED | OUTPATIENT
Start: 2022-02-23 | End: 2022-07-05

## 2022-02-23 RX ORDER — CARVEDILOL 25 MG/1
TABLET, FILM COATED ORAL
Qty: 200 STRIP | Refills: 3 | Status: SHIPPED | OUTPATIENT
Start: 2022-02-23

## 2022-02-23 RX ORDER — CARVEDILOL 25 MG/1
TABLET, FILM COATED ORAL
Qty: 200 STRIP | Refills: 3 | Status: CANCELLED | OUTPATIENT
Start: 2022-02-23

## 2022-02-23 NOTE — PROGRESS NOTES
Chioma was seen today for follow up, would like covid test and discuss med.    Diagnoses and all orders for this visit:    Type 2 diabetes mellitus without complication, without long-term current use of insulin (H)  -     blood glucose (CONTOUR TEST) test strip; [BLOOD GLUCOSE TEST (CONTOUR TEST STRIPS) STRIPS] Check blood sugar once daily.  Dispense brand per patient's insurance at pharmacy discretion.  -     blood glucose (NO BRAND SPECIFIED) lancets standard; Use to test blood sugar once daily or as directed.  -     Comprehensive metabolic panel (BMP + Alb, Alk Phos, ALT, AST, Total. Bili, TP); Future  -     Lipid Profile (Chol, Trig, HDL, LDL calc); Future  -     Lipid Profile (Chol, Trig, HDL, LDL calc)  -     Comprehensive metabolic panel (BMP + Alb, Alk Phos, ALT, AST, Total. Bili, TP)    [id number]  Meds: Continue with Metformin 1000 mg  No Microalbuminuria   On statin  Not a smoker  Counseled healthy lifestyle modifications  Discussed general issues about diabetes pathophysiology and management.  Addressed ADA diet.  Suggested low cholesterol diet.  Encouraged aerobic exercise.  Discussed foot care.  Reminded to get yearly retinal exam.  Discussed ways to avoid symptomatic hypoglycemia.  F/u in 3 months    Dyslipidemia  Continue with simvastatin 10 mg    Recurrent UTI  -     Urine Culture Aerobic Bacterial - lab collect; Future  She is taking nitrofurantoin 50 mg for prophylaxis due to recurrent urinary tract infection    Runny nose  Covid test  Flonase for symptomatic relief  Supportive cares  Follow-up as needed  -     Symptomatic; Unknown COVID-19 Virus (Coronavirus) by PCR; Future  -     fluticasone (FLONASE) 50 MCG/ACT nasal spray; Spray 1 spray into both nostrils daily    Subjective:   Chioma Mccormick is a 46 year old female who presents for follow up of diabetes. Evaluation to date has been via hemoglobin A1C and home blood sugars. Home sugars: 110s    For the last few days she has had runny nose  and sneezing.  No fever.  No cough.  She has been going to social events.  She works at [employer].  No known exposure to positive Covid cases.  she is fully vaccinated and received her booster.    The following portions of the patient's history were reviewed and updated as appropriate: allergies, current medications, past family history, past medical history, past social history, past surgical history and problem list.    Review of Systems  A 12 point comprehensive review of systems was negative except as noted.     Current Outpatient Medications   Medication Sig Dispense Refill     blood glucose (CONTOUR TEST) test strip [BLOOD GLUCOSE TEST (CONTOUR TEST STRIPS) STRIPS] Check blood sugar once daily.  Dispense brand per patient's insurance at pharmacy discretion. 200 strip 3     blood glucose (NO BRAND SPECIFIED) lancets standard Use to test blood sugar once daily or as directed. 200 each 3     calcium-vitamin D (CALCIUM-VITAMIN D) 500 mg(1,250mg) -200 unit per tablet [CALCIUM-VITAMIN D (CALCIUM-VITAMIN D) 500 MG(1,250MG) -200 UNIT PER TABLET] TAKE 1 TABLET BY MOUTH TWICE A DAY WITH MEALS 60 tablet 11     ciclopirox (PENLAC) 8 % solution [CICLOPIROX (PENLAC) 8 % SOLUTION] Apply over nail and surrounding skin. Apply daily over previous coat. After seven (7) days, may remove with alcohol and continue cycle. 6.6 mL 0     fluticasone (FLONASE) 50 MCG/ACT nasal spray Spray 1 spray into both nostrils daily 16 g 0     metFORMIN (GLUCOPHAGE) 1000 MG tablet Take 1 tablet (1,000 mg) by mouth daily (with dinner) 90 tablet 3     nitroFURantoin macrocrystal (MACRODANTIN) 50 MG capsule Take 1 capsule (50 mg) by mouth daily 90 capsule 3     simvastatin (ZOCOR) 10 MG tablet [SIMVASTATIN (ZOCOR) 10 MG TABLET] Take 1 tablet (10 mg total) by mouth at bedtime. 90 tablet 3          Objective:    Physical Exam   /64   Pulse 91   Wt 68.6 kg (151 lb 3 oz)   LMP 01/23/2022   SpO2 94%   BMI 30.54 kg/m     Constitutional: Patient  is oriented to person, place, and time. Patient appears well-developed and well-nourished. No distress.   Head: Normocephalic and atraumatic.   Right Ear: External ear normal.   Left Ear: External ear normal.   Nose: Nose normal.   Mouth/Throat: Oropharynx is clear and moist. No oropharyngeal exudate.   Eyes: Conjunctivae and EOM are normal. Pupils are equal, round, and reactive to light. Right eye exhibits no discharge. Left eye exhibits no discharge. No scleral icterus.   Neck: Neck supple. No JVD present. No tracheal deviation present. No thyromegaly present.   Cardiovascular: Normal rate, regular rhythm, normal heart sounds and intact distal pulses. No murmur heard.   Pulmonary/Chest: Effort normal and breath sounds normal. No stridor. No respiratory distress. Patient has no wheezes, no rales, exhibits no tenderness.   Abdominal: Soft. Bowel sounds are normal. Patient exhibits no distension and no mass. There is no tenderness. There is no rebound and no guarding.   Lymphadenopathy:  Patient has no cervical adenopathy.   Neurological: Patient is alert and oriented to person, place, and time. Patient has normal reflexes. No cranial nerve deficit. Coordination normal.   Skin: Skin is warm and dry. No rash noted. Patient is not diaphoretic. No erythema. No pallor.

## 2022-02-25 LAB — BACTERIA UR CULT: NORMAL

## 2022-03-31 DIAGNOSIS — N39.0 RECURRENT UTI: ICD-10-CM

## 2022-04-01 NOTE — TELEPHONE ENCOUNTER
Routing refill request to provider for review/approval because:  Drug not on the [facility] refill protocol     Last office visit provider:  2/23/2022 Dr. Marjorie Jama     Requested Prescriptions   Pending Prescriptions Disp Refills     nitroFURantoin macrocrystal (MACRODANTIN) 50 MG capsule [Pharmacy Med Name: NITROFURANTOIN MCR 50 MG CAP] 90 capsule 3     Sig: TAKE 1 CAPSULE (50 MG) BY MOUTH 4 TIMES DAILY       There is no refill protocol information for this order          Paty Padilla RN 04/01/22 4:15 PM

## 2022-04-04 RX ORDER — NITROFURANTOIN MACROCRYSTALS 50 MG/1
CAPSULE ORAL
Qty: 90 CAPSULE | Refills: 3 | Status: SHIPPED | OUTPATIENT
Start: 2022-04-04 | End: 2022-07-07

## 2022-04-05 DIAGNOSIS — E78.5 DYSLIPIDEMIA: ICD-10-CM

## 2022-04-05 DIAGNOSIS — E11.9 DM2 (DIABETES MELLITUS, TYPE 2) (H): ICD-10-CM

## 2022-04-05 DIAGNOSIS — E11.9 TYPE 2 DIABETES MELLITUS WITHOUT COMPLICATION, WITHOUT LONG-TERM CURRENT USE OF INSULIN (H): ICD-10-CM

## 2022-04-05 DIAGNOSIS — R09.89 RUNNY NOSE: ICD-10-CM

## 2022-04-05 NOTE — TELEPHONE ENCOUNTER
Pending Prescriptions:                       Disp   Refills    simvastatin (ZOCOR) 10 MG tablet          90 tab*3            Sig: Take 1 tablet (10 mg) by mouth At Bedtime    calcium carbonate-vitamin D (OSCAL W/D) 5*60 tab*11         Last visit 2/23/22  Last fill 2/9/21

## 2022-04-06 RX ORDER — SIMVASTATIN 10 MG
10 TABLET ORAL AT BEDTIME
Qty: 90 TABLET | Refills: 3 | Status: SHIPPED | OUTPATIENT
Start: 2022-04-06 | End: 2022-12-07

## 2022-07-05 ENCOUNTER — OFFICE VISIT (OUTPATIENT)
Dept: FAMILY MEDICINE | Facility: CLINIC | Age: 47
End: 2022-07-05
Payer: MEDICARE

## 2022-07-05 VITALS
DIASTOLIC BLOOD PRESSURE: 72 MMHG | HEART RATE: 68 BPM | WEIGHT: 152.1 LBS | BODY MASS INDEX: 30.72 KG/M2 | SYSTOLIC BLOOD PRESSURE: 118 MMHG

## 2022-07-05 DIAGNOSIS — E11.9 TYPE 2 DIABETES MELLITUS WITHOUT COMPLICATION, WITHOUT LONG-TERM CURRENT USE OF INSULIN (H): Primary | ICD-10-CM

## 2022-07-05 DIAGNOSIS — R30.0 DYSURIA: ICD-10-CM

## 2022-07-05 LAB
ALBUMIN UR-MCNC: NEGATIVE MG/DL
APPEARANCE UR: CLEAR
BACTERIA #/AREA URNS HPF: ABNORMAL /HPF
BILIRUB UR QL STRIP: NEGATIVE
COLOR UR AUTO: YELLOW
GLUCOSE UR STRIP-MCNC: NEGATIVE MG/DL
HBA1C MFR BLD: 7.3 % (ref 0–5.6)
HGB UR QL STRIP: NEGATIVE
KETONES UR STRIP-MCNC: NEGATIVE MG/DL
LEUKOCYTE ESTERASE UR QL STRIP: NEGATIVE
NITRATE UR QL: POSITIVE
PH UR STRIP: 7 [PH] (ref 5–8)
RBC #/AREA URNS AUTO: ABNORMAL /HPF
SP GR UR STRIP: 1.02 (ref 1–1.03)
SQUAMOUS #/AREA URNS AUTO: ABNORMAL /LPF
UROBILINOGEN UR STRIP-ACNC: 0.2 E.U./DL
WBC #/AREA URNS AUTO: ABNORMAL /HPF

## 2022-07-05 PROCEDURE — 36415 COLL VENOUS BLD VENIPUNCTURE: CPT | Performed by: FAMILY MEDICINE

## 2022-07-05 PROCEDURE — 81001 URINALYSIS AUTO W/SCOPE: CPT | Performed by: FAMILY MEDICINE

## 2022-07-05 PROCEDURE — 99214 OFFICE O/P EST MOD 30 MIN: CPT | Performed by: FAMILY MEDICINE

## 2022-07-05 PROCEDURE — 87186 SC STD MICRODIL/AGAR DIL: CPT | Performed by: FAMILY MEDICINE

## 2022-07-05 PROCEDURE — 87086 URINE CULTURE/COLONY COUNT: CPT | Performed by: FAMILY MEDICINE

## 2022-07-05 PROCEDURE — 83036 HEMOGLOBIN GLYCOSYLATED A1C: CPT | Performed by: FAMILY MEDICINE

## 2022-07-05 NOTE — PROGRESS NOTES
Assessment & Plan     Type 2 diabetes mellitus without complication, without long-term current use of insulin (H)  - Hemoglobin A1c  A1c=7.3  Meds: Continue with Metformin 1000 mg  No Microalbuminuria   On statin  Not a smoker  Counseled healthy lifestyle modifications  Discussed general issues about diabetes pathophysiology and management.  Addressed ADA diet.  Suggested low cholesterol diet.  Encouraged aerobic exercise.  Discussed foot care.  Reminded to get yearly retinal exam.  Discussed ways to avoid symptomatic hypoglycemia.  F/u in 3 months     Dyslipidemia  Continue with simvastatin 10 mg  LDL=46    Frequent UTI  On daily macrobid for prophylaxis  - UA Macro with Reflex to Micro and Culture - lab collect  - Urine Culture      Saud Jama MD  [facility] RENA Bedolla is a 47 year old presenting for the following health issues:  Diabetes (Follow up) and Urinary Problem (Just wanted to check to make sure she didn't have a bladder infection- she is not having any issues)      HPI     Diabetes Follow-up    How often are you checking your blood sugar? A few times a week  What time of day are you checking your blood sugars (select all that apply)?  After meals  Have you had any blood sugars above 200?  No  Have you had any blood sugars below 70?  No    What symptoms do you notice when your blood sugar is low?  None    What concerns do you have today about your diabetes? None     Do you have any of these symptoms? (Select all that apply)  No numbness or tingling in feet.  No redness, sores or blisters on feet.  No complaints of excessive thirst.  No reports of blurry vision.  No significant changes to weight.    Have you had a diabetic eye exam in the last 12 months? Yes- Date of last eye exam: 8/2021,  Location: Dr. Pena        BP Readings from Last 2 Encounters:   07/05/22 118/72   02/23/22 122/64     Hemoglobin A1C (%)   Date Value   07/05/2022 7.3  (H)   02/23/2022 7.4 (H)     LDL Cholesterol Calculated (mg/dL)   Date Value   02/23/2022 46   02/09/2021 31                 How many servings of fruits and vegetables do you eat daily?  2-3    On average, how many sweetened beverages do you drink each day (Examples: soda, juice, sweet tea, etc.  Do NOT count diet or artificially sweetened beverages)?   0    How many days per week do you exercise enough to make your heart beat faster? 7    How many minutes a day do you exercise enough to make your heart beat faster? 30 - 60    How many days per week do you miss taking your medication? 0          Objective    /72 (BP Location: Left arm, Patient Position: Sitting, Cuff Size: Adult Regular)   Pulse 68   Wt 69 kg (152 lb 1.6 oz)   BMI 30.72 kg/m    Body mass index is 30.72 kg/m .  Physical Exam       Objective:    Physical Exam   /72 (BP Location: Left arm, Patient Position: Sitting, Cuff Size: Adult Regular)   Pulse 68   Wt 69 kg (152 lb 1.6 oz)   BMI 30.72 kg/m     Constitutional: Patient is oriented to person, place, and time. Patient appears well-developed and well-nourished. No distress.   Head: Normocephalic and atraumatic.   Right Ear: External ear normal.   Left Ear: External ear normal.   Eyes: Conjunctivae and EOM are normal. Pupils are equal, round, and reactive to light. Right eye exhibits no discharge. Left eye exhibits no discharge. No scleral icterus.   Neck: Neck supple. No JVD present. No tracheal deviation present. No thyromegaly present.   Cardiovascular: Normal rate, regular rhythm, normal heart sounds and intact distal pulses. No murmur heard.   Pulmonary/Chest: Effort normal and breath sounds normal. No stridor. No respiratory distress. Patient has no wheezes, no rales, exhibits no tenderness.   Neurological: Patient is alert and oriented to person, place, and time. No cranial nerve deficit. Coordination normal.   Skin: Skin is warm and dry. No rash noted. Patient is not  diaphoretic. No erythema. No pallor.   Diabetic foot exam: normal DP and PT pulses, no trophic changes or ulcerative lesions and normal sensory exam

## 2022-07-07 DIAGNOSIS — N30.00 ACUTE CYSTITIS WITHOUT HEMATURIA: Primary | ICD-10-CM

## 2022-07-07 LAB — BACTERIA UR CULT: ABNORMAL

## 2022-07-07 RX ORDER — CEFTRIAXONE SODIUM 1 G
1 VIAL (EA) INJECTION ONCE
Status: COMPLETED | OUTPATIENT
Start: 2022-07-08 | End: 2022-07-11

## 2022-07-08 ENCOUNTER — TELEPHONE (OUTPATIENT)
Dept: FAMILY MEDICINE | Facility: CLINIC | Age: 47
End: 2022-07-08

## 2022-07-08 NOTE — TELEPHONE ENCOUNTER
"7-8-22  Reason for Call:  Zuri returning call    Detailed comments: Zuri Pt's PCA called stated Dr Amador called her last night and LM said  \"call clinic in the morning to schedule a injection only\" I dont see orders or a msg about this     Phone Number Patient can be reached at:[phone]  Best Time: anhytime    Can we leave a detailed message on this number? YES    Call taken on 7/8/2022 at 9:37 AM by Edelmira Garcia    "

## 2022-07-11 ENCOUNTER — TELEPHONE (OUTPATIENT)
Dept: FAMILY MEDICINE | Facility: CLINIC | Age: 47
End: 2022-07-11

## 2022-07-11 ENCOUNTER — ALLIED HEALTH/NURSE VISIT (OUTPATIENT)
Dept: FAMILY MEDICINE | Facility: CLINIC | Age: 47
End: 2022-07-11
Payer: COMMERCIAL

## 2022-07-11 DIAGNOSIS — R30.0 DYSURIA: Primary | ICD-10-CM

## 2022-07-11 PROCEDURE — 96372 THER/PROPH/DIAG INJ SC/IM: CPT | Performed by: FAMILY MEDICINE

## 2022-07-11 PROCEDURE — 99207 PR NO CHARGE NURSE ONLY: CPT

## 2022-07-11 RX ADMIN — Medication 1 G: at 15:26

## 2022-07-11 NOTE — TELEPHONE ENCOUNTER
Patient PCA called. Trying to schedule antibiotic only injection. PCP ordered rocephin medication for injection.    Please call to schedule. Prefers today 7/11/22 3:30PM or 3:45PM..

## 2022-07-11 NOTE — TELEPHONE ENCOUNTER
Spoke with Zuri and got Chioma scheduled for this afternoon. Zuri was wondering why antibiotics are not working for Chioma? She would also like to know if this is bladder infection vs Kidney infection. She would like PCP to advise.   Melida Esquivel LPN

## 2022-07-17 ENCOUNTER — TELEPHONE (OUTPATIENT)
Dept: LAB | Facility: CLINIC | Age: 47
End: 2022-07-17

## 2022-07-17 DIAGNOSIS — N39.0 URINARY TRACT INFECTION WITHOUT HEMATURIA, SITE UNSPECIFIED: Primary | ICD-10-CM

## 2022-07-17 NOTE — PROGRESS NOTES
Patient is coming in for a lab only appointment next Wednesday 7/27/22 and there are no orders in her chart.  She states it is for a urine test per Dr. Amador.  Please review and place future order if needed.  Thanks!

## 2022-07-27 ENCOUNTER — LAB (OUTPATIENT)
Dept: LAB | Facility: CLINIC | Age: 47
End: 2022-07-27
Payer: MEDICARE

## 2022-07-27 DIAGNOSIS — N39.0 URINARY TRACT INFECTION WITHOUT HEMATURIA, SITE UNSPECIFIED: ICD-10-CM

## 2022-07-27 PROCEDURE — 87086 URINE CULTURE/COLONY COUNT: CPT

## 2022-07-29 LAB — BACTERIA UR CULT: NORMAL

## 2022-08-24 NOTE — TELEPHONE ENCOUNTER
Addended by: YONG TADEO on: 8/24/2022 12:23 PM     Modules accepted: Orders     Reason for call:  Other   Patient called regarding (reason for call): call back    Additional comments: per paige, pt has urine issues a lot , she will like for orders to be placed for a UA test.     Phone number to reach patient:  Home number on file [phone] (home)    Best Time:  anytime    Can we leave a detailed message on this number?  NO    Travel screening: Not Applicable

## 2022-09-25 ENCOUNTER — HEALTH MAINTENANCE LETTER (OUTPATIENT)
Age: 47
End: 2022-09-25

## 2022-10-19 ENCOUNTER — TELEPHONE (OUTPATIENT)
Dept: FAMILY MEDICINE | Facility: CLINIC | Age: 47
End: 2022-10-19

## 2022-10-19 NOTE — TELEPHONE ENCOUNTER
Zuri (Caregiver) dropped off form for the PCP to be completed. Pt does have an upcoming physical on 10/24/22.  Left form in the FM core basket.  Thank you!

## 2022-10-20 NOTE — TELEPHONE ENCOUNTER
Form filled out/signed by Dr. Amador.  Called Zuri and let her know that the form is at the  and ready to be picked up.

## 2022-11-10 DIAGNOSIS — Z12.11 COLON CANCER SCREENING: Primary | ICD-10-CM

## 2022-12-07 ENCOUNTER — OFFICE VISIT (OUTPATIENT)
Dept: FAMILY MEDICINE | Facility: CLINIC | Age: 47
End: 2022-12-07
Payer: MEDICARE

## 2022-12-07 VITALS
BODY MASS INDEX: 29.43 KG/M2 | SYSTOLIC BLOOD PRESSURE: 128 MMHG | WEIGHT: 146 LBS | HEART RATE: 64 BPM | DIASTOLIC BLOOD PRESSURE: 74 MMHG | HEIGHT: 59 IN

## 2022-12-07 DIAGNOSIS — E11.9 TYPE 2 DIABETES MELLITUS WITHOUT COMPLICATION, WITHOUT LONG-TERM CURRENT USE OF INSULIN (H): Primary | ICD-10-CM

## 2022-12-07 DIAGNOSIS — E78.5 DYSLIPIDEMIA: ICD-10-CM

## 2022-12-07 DIAGNOSIS — F41.1 ANXIETY STATE: ICD-10-CM

## 2022-12-07 DIAGNOSIS — N39.0 RECURRENT UTI: ICD-10-CM

## 2022-12-07 LAB
ALBUMIN SERPL BCG-MCNC: 4.5 G/DL (ref 3.5–5.2)
ALP SERPL-CCNC: 66 U/L (ref 35–104)
ALT SERPL W P-5'-P-CCNC: 34 U/L (ref 10–35)
ANION GAP SERPL CALCULATED.3IONS-SCNC: 14 MMOL/L (ref 7–15)
AST SERPL W P-5'-P-CCNC: 30 U/L (ref 10–35)
BILIRUB SERPL-MCNC: 0.5 MG/DL
BUN SERPL-MCNC: 9.4 MG/DL (ref 6–20)
CALCIUM SERPL-MCNC: 9.3 MG/DL (ref 8.6–10)
CHLORIDE SERPL-SCNC: 100 MMOL/L (ref 98–107)
CHOLEST SERPL-MCNC: 96 MG/DL
CREAT SERPL-MCNC: 0.56 MG/DL (ref 0.51–0.95)
CREAT UR-MCNC: 74.7 MG/DL
DEPRECATED HCO3 PLAS-SCNC: 25 MMOL/L (ref 22–29)
GFR SERPL CREATININE-BSD FRML MDRD: >90 ML/MIN/1.73M2
GLUCOSE SERPL-MCNC: 138 MG/DL (ref 70–99)
HBA1C MFR BLD: 6.9 % (ref 0–5.6)
HDLC SERPL-MCNC: 31 MG/DL
LDLC SERPL CALC-MCNC: 40 MG/DL
MICROALBUMIN UR-MCNC: 34.6 MG/L
MICROALBUMIN/CREAT UR: 46.32 MG/G CR (ref 0–25)
NONHDLC SERPL-MCNC: 65 MG/DL
POTASSIUM SERPL-SCNC: 4.4 MMOL/L (ref 3.4–5.3)
PROT SERPL-MCNC: 7.1 G/DL (ref 6.4–8.3)
SODIUM SERPL-SCNC: 139 MMOL/L (ref 136–145)
TRIGL SERPL-MCNC: 126 MG/DL

## 2022-12-07 PROCEDURE — 99214 OFFICE O/P EST MOD 30 MIN: CPT | Performed by: FAMILY MEDICINE

## 2022-12-07 PROCEDURE — 80053 COMPREHEN METABOLIC PANEL: CPT | Performed by: FAMILY MEDICINE

## 2022-12-07 PROCEDURE — 83036 HEMOGLOBIN GLYCOSYLATED A1C: CPT | Performed by: FAMILY MEDICINE

## 2022-12-07 PROCEDURE — 82043 UR ALBUMIN QUANTITATIVE: CPT | Performed by: FAMILY MEDICINE

## 2022-12-07 PROCEDURE — 80061 LIPID PANEL: CPT | Performed by: FAMILY MEDICINE

## 2022-12-07 PROCEDURE — 87186 SC STD MICRODIL/AGAR DIL: CPT | Performed by: FAMILY MEDICINE

## 2022-12-07 PROCEDURE — 36415 COLL VENOUS BLD VENIPUNCTURE: CPT | Performed by: FAMILY MEDICINE

## 2022-12-07 PROCEDURE — 87086 URINE CULTURE/COLONY COUNT: CPT | Performed by: FAMILY MEDICINE

## 2022-12-07 RX ORDER — SIMVASTATIN 10 MG
10 TABLET ORAL AT BEDTIME
Qty: 90 TABLET | Refills: 3 | Status: SHIPPED | OUTPATIENT
Start: 2022-12-07 | End: 2023-06-13

## 2022-12-07 NOTE — PROGRESS NOTES
"  Assessment & Plan     Type 2 diabetes mellitus without complication, without long-term current use of insulin (H)  A1c pending  Refilled metformin  Continue with simvastatin  Encouraged yearly eye exam  - metFORMIN (GLUCOPHAGE) 1000 MG tablet  Dispense: 90 tablet; Refill: 3  - Hemoglobin A1c  - Albumin Random Urine Quantitative with Creat Ratio    Dyslipidemia  refilled  - Lipid Profile (Chol, Trig, HDL, LDL calc)  - Comprehensive metabolic panel (BMP + Alb, Alk Phos, ALT, AST, Total. Bili, TP)  - simvastatin (ZOCOR) 10 MG tablet  Dispense: 90 tablet; Refill: 3    Recurrent UTI  - Urine Culture Aerobic Bacterial - lab collect    Anxiety  Seeing therapist tomorrow    Saud Jama MD  [facility]   Chioma is a 47 year old ACCOMPANIED BY family friend Zuri presenting for the following health issues:  Diabetes (Blood and urine) and Recheck Medication      HPI     Here for DM check  Taking her metformin and simvastatin  A1c=7.3 about 5 months ago    She will be seeing therapist tomorrow for anxiety  She tends to worry a lot especially during situations that are unfamiliar to her        Objective    /74 (BP Location: Left arm, Patient Position: Sitting, Cuff Size: Adult Regular)   Pulse 64   Ht 1.499 m (4' 11\")   Wt 66.2 kg (146 lb)   BMI 29.49 kg/m    Body mass index is 29.49 kg/m .  Physical Exam       Objective:    Physical Exam   /74 (BP Location: Left arm, Patient Position: Sitting, Cuff Size: Adult Regular)   Pulse 64   Ht 1.499 m (4' 11\")   Wt 66.2 kg (146 lb)   BMI 29.49 kg/m     Constitutional: Patient is oriented to person, place, and time. Patient appears well-developed and well-nourished. No distress.   Head: Normocephalic and atraumatic.   Right Ear: External ear normal.   Left Ear: External ear normal.   Eyes: Conjunctivae and EOM are normal. Pupils are equal, round, and reactive to light. Right eye exhibits no discharge. " Left eye exhibits no discharge. No scleral icterus.   Cardiovascular: Normal rate, regular rhythm  Pulmonary/Chest: Effort normal and breath sounds normal. No stridor. No respiratory distress. Patient has no wheezes, no rales, exhibits no tenderness.   Abdominal: Soft. Patient exhibits no distension and no mass. There is no tenderness. There is no rebound and no guarding.   Skin: Skin is warm and dry. No rash noted. Patient is not diaphoretic. No erythema. No pallor.

## 2022-12-08 LAB — BACTERIA UR CULT: ABNORMAL

## 2022-12-12 DIAGNOSIS — N39.0 URINARY TRACT INFECTION WITHOUT HEMATURIA, SITE UNSPECIFIED: Primary | ICD-10-CM

## 2022-12-12 RX ORDER — SULFAMETHOXAZOLE/TRIMETHOPRIM 800-160 MG
1 TABLET ORAL 2 TIMES DAILY
Qty: 6 TABLET | Refills: 0 | Status: SHIPPED | OUTPATIENT
Start: 2022-12-12 | End: 2023-03-21

## 2023-01-10 ENCOUNTER — TELEPHONE (OUTPATIENT)
Dept: FAMILY MEDICINE | Facility: CLINIC | Age: 48
End: 2023-01-10

## 2023-01-10 NOTE — TELEPHONE ENCOUNTER
Zuri calling  - care taker      Patient has had many UTI or Bladder infection for last 2 years, Zuri was concerned about follow up as next appointment is not until March with new PCP.    Zuri stated that patient does not show signs of pain and is worried that she may not know when or if patient would get another infection.     RN confirmed with Zuri that Dr Amador's note from 12/07/2022 did not call out any specific concerns on follow up with the UTI concerns.     Zuri should encourage good water intake, cranberry juice (not sugar content kind), watch for new or worsening incontinence, fever, any belly or flank pain.    RN informed Zuri of RN triage available 24/7 for any questions and concerns and encouraged her to reach out.    MELBA Lamb  [facility]

## 2023-01-11 ENCOUNTER — TRANSFERRED RECORDS (OUTPATIENT)
Dept: MULTI SPECIALTY CLINIC | Facility: CLINIC | Age: 48
End: 2023-01-11

## 2023-01-11 LAB — RETINOPATHY: NORMAL

## 2023-01-13 ENCOUNTER — MEDICAL CORRESPONDENCE (OUTPATIENT)
Dept: HEALTH INFORMATION MANAGEMENT | Facility: CLINIC | Age: 48
End: 2023-01-13

## 2023-02-04 ENCOUNTER — HEALTH MAINTENANCE LETTER (OUTPATIENT)
Age: 48
End: 2023-02-04

## 2023-03-21 ENCOUNTER — MEDICAL CORRESPONDENCE (OUTPATIENT)
Dept: HEALTH INFORMATION MANAGEMENT | Facility: CLINIC | Age: 48
End: 2023-03-21

## 2023-03-21 ENCOUNTER — TELEPHONE (OUTPATIENT)
Dept: FAMILY MEDICINE | Facility: CLINIC | Age: 48
End: 2023-03-21

## 2023-03-21 ENCOUNTER — OFFICE VISIT (OUTPATIENT)
Dept: FAMILY MEDICINE | Facility: CLINIC | Age: 48
End: 2023-03-21
Payer: MEDICARE

## 2023-03-21 VITALS
HEIGHT: 59 IN | TEMPERATURE: 97.6 F | WEIGHT: 150 LBS | DIASTOLIC BLOOD PRESSURE: 70 MMHG | SYSTOLIC BLOOD PRESSURE: 126 MMHG | BODY MASS INDEX: 30.24 KG/M2 | HEART RATE: 76 BPM | OXYGEN SATURATION: 97 %

## 2023-03-21 DIAGNOSIS — Z00.00 ENCOUNTER FOR MEDICARE ANNUAL WELLNESS EXAM: ICD-10-CM

## 2023-03-21 DIAGNOSIS — Z78.9 LIVES IN GROUP HOME: Primary | ICD-10-CM

## 2023-03-21 DIAGNOSIS — Z12.31 VISIT FOR SCREENING MAMMOGRAM: ICD-10-CM

## 2023-03-21 DIAGNOSIS — Z12.11 SCREEN FOR COLON CANCER: ICD-10-CM

## 2023-03-21 DIAGNOSIS — E11.9 TYPE 2 DIABETES MELLITUS WITHOUT COMPLICATION, WITHOUT LONG-TERM CURRENT USE OF INSULIN (H): ICD-10-CM

## 2023-03-21 DIAGNOSIS — Z12.11 COLON CANCER SCREENING: ICD-10-CM

## 2023-03-21 DIAGNOSIS — Q90.9 DOWN'S SYNDROME: ICD-10-CM

## 2023-03-21 DIAGNOSIS — Z11.4 SCREENING FOR HIV (HUMAN IMMUNODEFICIENCY VIRUS): ICD-10-CM

## 2023-03-21 DIAGNOSIS — Z11.59 NEED FOR HEPATITIS C SCREENING TEST: ICD-10-CM

## 2023-03-21 DIAGNOSIS — Z12.31 ENCOUNTER FOR SCREENING MAMMOGRAM FOR BREAST CANCER: ICD-10-CM

## 2023-03-21 PROCEDURE — 90677 PCV20 VACCINE IM: CPT | Performed by: PHYSICIAN ASSISTANT

## 2023-03-21 PROCEDURE — 99212 OFFICE O/P EST SF 10 MIN: CPT | Mod: 25 | Performed by: PHYSICIAN ASSISTANT

## 2023-03-21 PROCEDURE — G0009 ADMIN PNEUMOCOCCAL VACCINE: HCPCS | Performed by: PHYSICIAN ASSISTANT

## 2023-03-21 PROCEDURE — G0439 PPPS, SUBSEQ VISIT: HCPCS | Performed by: PHYSICIAN ASSISTANT

## 2023-03-21 ASSESSMENT — ENCOUNTER SYMPTOMS
EYE PAIN: 0
NAUSEA: 0
HEARTBURN: 0
COUGH: 0
ARTHRALGIAS: 0
NERVOUS/ANXIOUS: 1
SORE THROAT: 0
HEMATOCHEZIA: 0
PALPITATIONS: 0
HEADACHES: 0
FREQUENCY: 0
SHORTNESS OF BREATH: 0
PARESTHESIAS: 0
CHILLS: 0
DIARRHEA: 0
JOINT SWELLING: 0
FEVER: 0
ABDOMINAL PAIN: 0
MYALGIAS: 0
HEMATURIA: 0
CONSTIPATION: 0
BREAST MASS: 0
DYSURIA: 0
DIZZINESS: 0
WEAKNESS: 0

## 2023-03-21 ASSESSMENT — ACTIVITIES OF DAILY LIVING (ADL)
CURRENT_FUNCTION: SHOPPING REQUIRES ASSISTANCE
CURRENT_FUNCTION: TRANSPORTATION REQUIRES ASSISTANCE
CURRENT_FUNCTION: MONEY MANAGEMENT REQUIRES ASSISTANCE
CURRENT_FUNCTION: HOUSEWORK REQUIRES ASSISTANCE
CURRENT_FUNCTION: PREPARING MEALS REQUIRES ASSISTANCE

## 2023-03-21 NOTE — PROGRESS NOTES
"SUBJECTIVE:   Chioma is a 48 year old who presents for Preventive Visit.  Patient has been advised of split billing requirements and indicates understanding: Yes  Are you in the first 12 months of your Medicare coverage?  No    Healthy Habits:     In general, how would you rate your overall health?  Good    Frequency of exercise:  None    Do you usually eat at least 4 servings of fruit and vegetables a day, include whole grains    & fiber and avoid regularly eating high fat or \"junk\" foods?  Yes    Taking medications regularly:  Yes    Medication side effects:  None    Ability to successfully perform activities of daily living:  Transportation requires assistance, shopping requires assistance, preparing meals requires assistance, housework requires assistance and money management requires assistance    Home Safety:  No safety concerns identified    Hearing Impairment:  No hearing concerns    In the past 6 months, have you been bothered by leaking of urine?  No    In general, how would you rate your overall mental or emotional health?  Good      PHQ-2 Total Score: 0    Additional concerns today:  No      Have you ever done Advance Care Planning? (For example, a Health Directive, POLST, or a discussion with a medical provider or your loved ones about your wishes): No, advance care planning information given to patient to review.  Patient plans to discuss their wishes with loved ones or provider.         Fall risk  Fallen 2 or more times in the past year?: No  Any fall with injury in the past year?: No    Cognitive Screening   1) Repeat 3 items (Leader, Season, Table)    2) Clock draw: NORMAL  3) 3 item recall: }Recalls 3 objects  Results: ABNORMAL clock, 1-2 items recalled: PROBABLE COGNITIVE IMPAIRMENT, **INFORM PROVIDER**    Mini-CogTM Copyright MAHENDRA Coker. Licensed by the author for use in [facility]; reprinted with permission (genie@.[facility]). All rights reserved.          Reviewed and updated as needed " this visit by clinical staff   Tobacco  Allergies  Meds  Problems  Med Hx  Surg Hx  Fam Hx          Reviewed and updated as needed this visit by Provider   Tobacco  Allergies  Meds  Problems  Med Hx  Surg Hx  Fam Hx         Social History     Tobacco Use     Smoking status: Never     Smokeless tobacco: Never   Substance Use Topics     Alcohol use: No         Alcohol Use 3/21/2023   Prescreen: >3 drinks/day or >7 drinks/week? Not Applicable       Current providers sharing in care for this patient include:   Patient Care Team:  Akilah Navarro PA-C as PCP - General (Family Medicine)  Saud Coley MD as Assigned PCP    The following health maintenance items are reviewed in Epic and correct as of today:  Health Maintenance   Topic Date Due     HEPATITIS B IMMUNIZATION (1 of 3 - 3-dose series) Never done     COLORECTAL CANCER SCREENING  Never done     HIV SCREENING  Never done     HEPATITIS C SCREENING  Never done     MEDICARE ANNUAL WELLNESS VISIT  07/19/2022     EYE EXAM  08/01/2022     MAMMO SCREENING  08/27/2022     A1C  06/07/2023     DIABETIC FOOT EXAM  07/05/2023     BMP  12/07/2023     LIPID  12/07/2023     MICROALBUMIN  12/07/2023     ANNUAL REVIEW OF HM ORDERS  03/21/2024     HPV TEST  04/17/2024     ADVANCE CARE PLANNING  04/17/2024     PAP  04/17/2024     DTAP/TDAP/TD IMMUNIZATION (4 - Td or Tdap) 01/16/2029     PHQ-2 (once per calendar year)  Completed     INFLUENZA VACCINE  Completed     Pneumococcal Vaccine: Pediatrics (0 to 5 Years) and At-Risk Patients (6 to 64 Years)  Completed     COVID-19 Vaccine  Completed     IPV IMMUNIZATION  Aged Out     MENINGITIS IMMUNIZATION  Aged Out     Labs reviewed in EPIC  Pneumonia Vaccine:For adults with an immunocompromising condition, cerebrospinal fluid leak, or cochlear implant, administer 1 dose of PCV13 first then give 1 dose of PPSV23 at least 1 year later. Anyone who received any doses of PPSV23 before age 65 should receive 1 final  dose of the vaccine at age 65 or older. Administer this last dose at least 5 years after the prior PPSV23 dose.    FHS-7:   Breast CA Risk Assessment (FHS-7) 8/27/2021 3/21/2023   Did any of your first-degree relatives have breast or ovarian cancer? No No   Did any of your relatives have bilateral breast cancer? No No   Did any man in your family have breast cancer? No No   Did any woman in your family have breast and ovarian cancer? No No   Did any woman in your family have breast cancer before age 50 y? No No   Do you have 2 or more relatives with breast and/or ovarian cancer? No No   Do you have 2 or more relatives with breast and/or bowel cancer? No No       Mammogram Screening: Recommended annual mammography  Pertinent mammograms are reviewed under the imaging tab.    Review of Systems   Constitutional: Negative for chills and fever.   HENT: Negative for congestion, ear pain, hearing loss and sore throat.    Eyes: Negative for pain and visual disturbance.   Respiratory: Negative for cough and shortness of breath.    Cardiovascular: Negative for chest pain, palpitations and peripheral edema.   Gastrointestinal: Negative for abdominal pain, constipation, diarrhea, heartburn, hematochezia and nausea.   Breasts:  Negative for tenderness, breast mass and discharge.   Genitourinary: Negative for dysuria, frequency, genital sores, hematuria, pelvic pain, urgency, vaginal bleeding and vaginal discharge.   Musculoskeletal: Negative for arthralgias, joint swelling and myalgias.   Skin: Negative for rash.   Neurological: Negative for dizziness, weakness, headaches and paresthesias.   Psychiatric/Behavioral: Negative for mood changes. The patient is nervous/anxious.      Constitutional, HEENT, cardiovascular, pulmonary, gi and gu systems are negative, except as otherwise noted.    OBJECTIVE:   /70 (BP Location: Left arm, Patient Position: Sitting, Cuff Size: Adult Regular)   Pulse 76   Temp 97.6  F (36.4  C) (Oral)  "  Ht 1.499 m (4' 11\")   Wt 68 kg (150 lb)   SpO2 97%   BMI 30.30 kg/m   Estimated body mass index is 30.3 kg/m  as calculated from the following:    Height as of this encounter: 1.499 m (4' 11\").    Weight as of this encounter: 68 kg (150 lb).  Physical Exam  GENERAL: healthy, alert and no distress  EYES: Eyes grossly normal to inspection, PERRL and conjunctivae and sclerae normal  HENT: ear canals and TM's normal, nose and mouth without ulcers or lesions  NECK: no adenopathy, no asymmetry, masses, or scars and thyroid normal to palpation  RESP: lungs clear to auscultation - no rales, rhonchi or wheezes  BREAST: normal without masses, tenderness or nipple discharge and no palpable axillary masses or adenopathy  CV: regular rate and rhythm, normal S1 S2, no S3 or S4, no murmur, click or rub, no peripheral edema and peripheral pulses strong  ABDOMEN: soft, nontender, no hepatosplenomegaly, no masses and bowel sounds normal  MS: no gross musculoskeletal defects noted, no edema  SKIN: no suspicious lesions or rashes  NEURO: Normal strength and tone, mentation intact and speech normal  PSYCH: mentation appears normal, affect normal/bright    Diagnostic Test Results:  Labs reviewed in Epic    ASSESSMENT / PLAN:       ICD-10-CM    1. Lives in group home  Z59.3       2. Screen for colon cancer  Z12.11 COLOGUARD(EXACT SCIENCES)      3. Colon cancer screening  Z12.11       4. Screening for HIV (human immunodeficiency virus)  Z11.4       5. Need for hepatitis C screening test  Z11.59 Hepatitis C Screen Reflex to HCV RNA Quant and Genotype      6. Type 2 diabetes mellitus without complication, without long-term current use of insulin (H)  E11.9 Adult Eye  Referral      7. Visit for screening mammogram  Z12.31 MA SCREENING DIGITAL BILAT - Future  (s+30)      8. Encounter for screening mammogram for breast cancer  Z12.31 MA SCREENING DIGITAL BILAT - Future  (s+30)      9. Trisomy 21 (Down Syndrome)  Q90.9     " "    Physical done for group home admission  Pt/uncle's girlfriend will check with the group home to see if they require labs prior to admission and contact clinic if needed we will be able to do as a lab only visit        COUNSELING:  Reviewed preventive health counseling, as reflected in patient instructions       Regular exercise       Vision screening      BMI:   Estimated body mass index is 30.3 kg/m  as calculated from the following:    Height as of this encounter: 1.499 m (4' 11\").    Weight as of this encounter: 68 kg (150 lb).   Weight management plan: not addressed      She reports that she has never smoked. She has never used smokeless tobacco.      Appropriate preventive services were discussed with this patient, including applicable screening as appropriate for cardiovascular disease, diabetes, osteopenia/osteoporosis, and glaucoma.  As appropriate for age/gender, discussed screening for colorectal cancer, prostate cancer, breast cancer, and cervical cancer. Checklist reviewing preventive services available has been given to the patient.    Reviewed patients plan of care and provided an AVS. The Basic Care Plan (routine screening as documented in Health Maintenance) for Chioma meets the Care Plan requirement. This Care Plan has been established and reviewed with the Patient and uncle.      Akilah Navarro PA-C  [facility]    Identified Health Risks:    She is at risk for lack of exercise and has been provided with information to increase physical activity for the benefit of her well-being.  The patient reports that she has difficulty with activities of daily living. I have asked that the patient make a follow up appointment in 53 weeks where this issue will be further evaluated and addressed.  "

## 2023-03-21 NOTE — PATIENT INSTRUCTIONS
Patient Education   Personalized Prevention Plan  You are due for the preventive services outlined below.  Your care team is available to assist you in scheduling these services.  If you have already completed any of these items, please share that information with your care team to update in your medical record.  Health Maintenance Due   Topic Date Due     Hepatitis B Vaccine (1 of 3 - 3-dose series) Never done     Colorectal Cancer Screening  Never done     HIV Screening  Never done     Hepatitis C Screening  Never done     Eye Exam  08/01/2022     Mammogram  08/27/2022       Exercise for a Healthier Heart  You may wonder how you can improve the health of your heart. If you re thinking about exercise, you re on the right track. You don t need to become an athlete. But you do need a certain amount of brisk exercise to help strengthen your heart. If you have been diagnosed with a heart condition, your healthcare provider may advise exercise to help your condition. To help make exercise a habit, choose safe, fun activities.      Exercise with a friend. When activity is fun, you're more likely to stick with it.     Before you start  Check with your healthcare provider before starting an exercise program. This is especially important if you haven't been active for a while. It's also important if you have a long-term (chronic) health problem such as heart disease, diabetes, or obesity. Also check with your provider if you're at high risk for having these problems.   Why exercise?  Exercising regularly offers many healthy rewards. It can help you do all of these:     Improve your blood cholesterol level to help prevent further heart trouble.    Lower your blood pressure to help prevent a stroke or heart attack.    Control diabetes or reduce your risk of getting this disease.    Improve your heart and lung function.    Reach and stay at a healthy weight.    Make your muscles stronger so you can stay active.    Prevent  falls and fractures by slowing the loss of bone mass (osteoporosis).    Manage stress better.    Improve your sense of self and your body image.  Exercise tips      Ease into your routine. Set small goals. Then build on them. Talk with your healthcare provider first before starting an exercise routine if you're not sure what your activity level should be.    Exercise on most days. Aim for a total of at least 150 minutes (2 hours and 30 minutes) or more of moderate-intensity aerobic activity each week. You could also do 75 minutes (1 hour and 15 minutes) or more of vigorous-intensity aerobic activity each week. Or try for a combination of both. Moderate activity means that you breathe heavier and your heart rate increases, but you can still talk. Think about doing at least 30 minutes of moderate exercise, 5 times a week. It's OK to work up to the 30-minute period over time. Examples of moderate-intensity activity are brisk walking, gardening, and water aerobics.    Step up your daily activity level.  Along with your exercise program, try being more active the whole day. Walk instead of drive. Or park further away so that you take more steps each day. Do more household tasks or yard work. You may not be able to meet the advised amount of physical activity. But doing some moderate- or vigorous-intensity aerobic activity can help reduce your risk for heart disease. Your healthcare provider can help you figure out what is best for you.    Choose 1 or more activities you enjoy.  Walking is one of the easiest things you can do. You can also try swimming, riding a bike, dancing, or taking an exercise class.    Call 911  Call 911 right away if any of these occur:     Chest pain that doesn't go away quickly with rest    New burning, tightness, pressure, or heaviness in your chest, neck, shoulders, back, or arms    Abnormal or severe shortness of breath    A very fast or irregular heartbeat (palpitations)    Fainting  When to  call your healthcare provider  Call your healthcare provider if you have any of these:     Dizziness or lightheadedness    Mild shortness of breath or chest pain    Increased or new joint or muscle pain    Roberta last reviewed this educational content on 7/1/2022 2000-2022 [employer]. All rights reserved. This information is not intended as a substitute for professional medical care. Always follow your healthcare professional's instructions.        Activities of Daily Living    Your Health Risk Assessment indicates you have difficulties with activities of daily living such as housework, bathing, preparing meals, taking medication, etc. Please make a follow up appointment for us to address this issue in more detail.

## 2023-04-04 ENCOUNTER — LAB (OUTPATIENT)
Dept: FAMILY MEDICINE | Facility: CLINIC | Age: 48
End: 2023-04-04
Payer: MEDICARE

## 2023-04-04 DIAGNOSIS — Z12.11 SCREEN FOR COLON CANCER: ICD-10-CM

## 2023-04-05 ENCOUNTER — HOSPITAL ENCOUNTER (OUTPATIENT)
Dept: MAMMOGRAPHY | Facility: CLINIC | Age: 48
Discharge: HOME | End: 2023-04-05
Attending: PHYSICIAN ASSISTANT | Admitting: PHYSICIAN ASSISTANT
Payer: MEDICARE

## 2023-04-05 DIAGNOSIS — Z12.31 VISIT FOR SCREENING MAMMOGRAM: ICD-10-CM

## 2023-04-05 DIAGNOSIS — Z12.31 ENCOUNTER FOR SCREENING MAMMOGRAM FOR BREAST CANCER: ICD-10-CM

## 2023-04-05 PROCEDURE — 77067 SCR MAMMO BI INCL CAD: CPT

## 2023-04-16 DIAGNOSIS — E11.9 TYPE 2 DIABETES MELLITUS WITHOUT COMPLICATIONS (H): ICD-10-CM

## 2023-04-16 NOTE — TELEPHONE ENCOUNTER
"Last Written Prescription Date:  4/6/22  Last Fill Quantity: 60,  # refills: 11   Last office visit provider:   3/21/23    Requested Prescriptions   Pending Prescriptions Disp Refills     calcium carbonate-vitamin D (OSCAL) 500-5 MG-MCG tablet [Pharmacy Med Name: DANIAL DHALIWAL JADEN-VIT D 500-200 TB] 60 tablet 11     Sig: TAKE 1 TABLET BY MOUTH TWICE A DAY WITH MEALS       Vitamin Supplements (Adult) Protocol Failed - 4/16/2023  2:08 PM        Failed - Medication is active on med list        Passed - High dose Vitamin D not ordered        Passed - Recent (12 mo) or future (30 days) visit within the authorizing provider's specialty     Patient has had an office visit with the authorizing provider or a provider within the authorizing providers department within the previous 12 mos or has a future within next 30 days. See \"Patient Info\" tab in inbasket, or \"Choose Columns\" in Meds & Orders section of the refill encounter.                   Samira Mccauley RN 04/16/23 6:17 PM  "

## 2023-05-26 ENCOUNTER — OFFICE VISIT (OUTPATIENT)
Dept: INTERNAL MEDICINE | Facility: CLINIC | Age: 48
End: 2023-05-26
Payer: MEDICARE

## 2023-05-26 VITALS
HEART RATE: 75 BPM | HEIGHT: 59 IN | RESPIRATION RATE: 14 BRPM | SYSTOLIC BLOOD PRESSURE: 122 MMHG | WEIGHT: 153.4 LBS | BODY MASS INDEX: 30.92 KG/M2 | DIASTOLIC BLOOD PRESSURE: 66 MMHG | OXYGEN SATURATION: 98 %

## 2023-05-26 DIAGNOSIS — E78.5 DYSLIPIDEMIA: ICD-10-CM

## 2023-05-26 DIAGNOSIS — Q90.9 DOWN'S SYNDROME: Primary | ICD-10-CM

## 2023-05-26 DIAGNOSIS — F41.1 ANXIETY STATE: ICD-10-CM

## 2023-05-26 DIAGNOSIS — F70 MILD INTELLECTUAL DISABILITIES: ICD-10-CM
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
## 2023-05-26 PROCEDURE — 99214 OFFICE O/P EST MOD 30 MIN: CPT | Performed by: INTERNAL MEDICINE

## 2023-05-26 RX ORDER — GUAIFENESIN AND DEXTROMETHORPHAN HYDROBROMIDE 100; 10 MG/5ML; MG/5ML
5 SOLUTION ORAL EVERY 4 HOURS PRN
COMMUNITY
End: 2023-06-27

## 2023-05-26 RX ORDER — ACETAMINOPHEN 500 MG
500-1000 TABLET ORAL EVERY 6 HOURS PRN
COMMUNITY

## 2023-05-26 RX ORDER — MULTIPLE VITAMINS W/ MINERALS TAB 9MG-400MCG
1 TAB ORAL DAILY PRN
COMMUNITY
End: 2023-06-27

## 2023-05-26 NOTE — PROGRESS NOTES
Office Visit - New Patient   Chioma Mccormick   48 year old  female    Date of visit: 5/26/2023  Physician: DOMINIC MACEDO MD, MD     Assessment and Plan    Comes in to have forms filled out for entering a new adult foster care program, and also for [employer] [place].    Chioma comes to clinic today accompanied by her caregiver Evelyn Mtz, and Chioma will be moving into new foster care on June 1, 2023.    I filled out a form for Chioma from [facility], adult foster care history and physical examination form.    Chioma carries a diagnosis of Down syndrome, mild intellectual disability, anxiety, and type 2 diabetes.  Medically she has been doing just great.  She takes metformin for diabetes, simvastatin for hyperlipidemia in the context of diabetes.    Diabetes has been well controlled A1c 6.9 measured December 7, 2022  Lipids controlled measured same date, LDL 40, triglycerides 126, HDL 31, total cholesterol 96    She has no physical limitations.  No medication allergies known.  Physical exam with clear oropharynx, clear sounding lungs, heart regular rate rhythm with no murmur, abdomen nontender, extremities no edema or tenderness, steady stable gait.    Her usual PCP is Akilah Navarro PA-C.  Chioma needs to have a follow-up visit with Akilah in June 2023, last visit was in March 2023.    For the [employer] form, Chioma has no restrictions.  She is excited to start swimming.    I will include Chioma's vaccination history in her after visit summary form, and also include a medication list.  We added as needed medication which is Tylenol for aches pains, Robitussin-DM cough syrup for cough, and multivitamin daily as needed for nutrition    Chioma is able to self administer her medications.  She will get assistance from the foster home staff for medication  reordering.      -------------------------------------------------------------------------------------------------------------------------  Chief Complaint   Chief Complaint   Patient presents with     Physical     Admission physical before moving into a new foster home and one for special [employer]        ---------------------------------------------------------------------------------------------------------------------------  History of Present Illness  This 48 year old old       Physical (Admission physical before moving into a new foster home and one for [employer])          5/26/2023     3:56 PM   Additional Questions   Roomed by Ita Lowe 5/26/2023   Any forms needing to be completed Yes      History of Present Illness         Reason for visit:  Intake medical forms to be filled out     She eats 0-1 servings of fruits and vegetables daily.She consumes 2 sweetened beverage(s) daily.She exercises with enough effort to increase her heart rate 9 or less minutes per day.  She exercises with enough effort to increase her heart rate 3 or less days per week.   She is taking medications regularly.       Immunization History   Administered Date(s) Administered     COVID-19 Bivalent 12+ (Pfizer) 09/22/2022     COVID-19 MONOVALENT 12+ (Pfizer) 03/22/2021, 04/12/2021, 11/03/2021     DT (PEDS <7y) 12/21/2004     FLU 6-35 months 11/18/2009, 09/29/2010, 10/04/2013     Flu, Unspecified 11/17/2007, 10/25/2008, 11/18/2011, 11/01/2012     HepA, Unspecified 09/06/2004, 03/15/2007, 08/29/2008     Hepatitis A (ADULT 19+) 09/06/2004, 03/15/2007, 08/29/2008     Influenza (IIV3) PF 11/22/2005, 11/21/2006, 11/17/2007, 10/25/2008, 11/18/2011, 09/19/2012, 11/01/2012, 10/03/2013     Influenza Vaccine >6 months (Alfuria,Fluzone) 09/06/2019, 09/08/2021, 09/22/2022     Influenza Vaccine, 6+MO IM (QUADRIVALENT W/PRESERVATIVES) 09/01/2016, 09/20/2017     Influenza,INJ,MDCK,PF,Quad >6mo(Flucelvax) 10/16/2018, 10/08/2020      Pneumococcal 20 valent Conjugate (Prevnar 20) 03/21/2023     Pneumococcal 23 valent 12/22/2011     TD,PF 7+ (Tenivac) 01/16/2019     TDAP (Adacel,Boostrix) 08/29/2008     Td,adult,historic,unspecified 08/29/2008         Wt Readings from Last 3 Encounters:   05/26/23 69.6 kg (153 lb 6.4 oz)   03/21/23 68 kg (150 lb)   12/07/22 66.2 kg (146 lb)     BP Readings from Last 3 Encounters:   05/26/23 122/66   03/21/23 126/70   12/07/22 128/74       Lab Results   Component Value Date    CHOL 96 12/07/2022    TRIG 126 12/07/2022    HDL 31 (L) 12/07/2022    ALT 34 12/07/2022    AST 30 12/07/2022     12/07/2022    BUN 9.4 12/07/2022    CO2 25 12/07/2022    MICROALBUR <0.50 11/03/2021           Review of Systems: A comprehensive review of systems was negative except as noted.  ---------------------------------------------------------------------------------------------------------------------------    Medications and Allergies   Current Outpatient Medications   Medication Sig Dispense Refill     acetaminophen (TYLENOL) 500 MG tablet Take 500-1,000 mg by mouth every 6 hours as needed for pain       blood glucose (CONTOUR TEST) test strip [BLOOD GLUCOSE TEST (CONTOUR TEST STRIPS) STRIPS] Check blood sugar once daily.  Dispense brand per patient's insurance at pharmacy discretion. 200 strip 3     blood glucose (NO BRAND SPECIFIED) lancets standard Use to test blood sugar once daily or as directed. 200 each 3     calcium carbonate-vitamin D (OSCAL W/D) 500-200 MG-UNIT tablet [CALCIUM-VITAMIN D (CALCIUM-VITAMIN D) 500 MG(1,250MG) -200 UNIT PER TABLET] TAKE 1 TABLET BY MOUTH TWICE A DAY WITH MEALS 60 tablet 11     Dextromethorphan-guaiFENesin  MG/5ML syrup Take 5 mLs by mouth every 4 hours as needed for cough       metFORMIN (GLUCOPHAGE) 1000 MG tablet Take 1 tablet (1,000 mg) by mouth daily (with dinner) 90 tablet 3     multivitamin w/minerals (THERA-VIT-M) tablet Take 1 tablet by mouth daily as needed for nutrition        "simvastatin (ZOCOR) 10 MG tablet Take 1 tablet (10 mg) by mouth At Bedtime 90 tablet 3     No Known Allergies     Active Ambulatory Problems     Diagnosis Date Noted     Trisomy 21 (Down Syndrome)      DM (diabetes mellitus) (H)      Anxiety      Mild Intellectual Disabilities      Dyslipidemia 03/24/2016     Onychomycosis 02/09/2021     Resolved Ambulatory Problems     Diagnosis Date Noted     No Resolved Ambulatory Problems     No Additional Past Medical History     No past surgical history on file.   No past medical history on file.     Family and Social History   Family History   Problem Relation Age of Onset     Cancer Mother      Diabetes No family hx of      Heart Disease No family hx of      Breast Cancer No family hx of      Colon Cancer No family hx of         Social History     Tobacco Use     Smoking status: Never     Smokeless tobacco: Never   Substance Use Topics     Alcohol use: No     Drug use: No        Physical Exam   General Appearance:       /66 (BP Location: Right arm, Patient Position: Sitting, Cuff Size: Adult Regular)   Pulse 75   Resp 14   Ht 1.499 m (4' 11\")   Wt 69.6 kg (153 lb 6.4 oz)   SpO2 98%   BMI 30.98 kg/m    Very pleasant, communicative, mild intellectual disability evident, but her insight and judgment seem quite good  Physical exam with clear oropharynx, clear sounding lungs, heart regular rate rhythm with no murmur, abdomen nontender, extremities no edema or tenderness, steady stable gait.       Additional Information        DOMINIC MACEDO MD, MD  Internal Medicine    "

## 2023-05-26 NOTE — PATIENT INSTRUCTIONS
Comes in to have forms filled out for entering a new adult foster care program, and also for [employer] [place].    Chioma comes to clinic today accompanied by her caregiver Evelyn Mtz, and Chioma will be moving into new foster care on June 1, 2023.    I filled out a form for Chioma from [facility], adult foster care history and physical examination form.    Chioma carries a diagnosis of Down syndrome, mild intellectual disability, anxiety, and type 2 diabetes.  Medically she has been doing just great.  She takes metformin for diabetes, simvastatin for hyperlipidemia in the context of diabetes.    Diabetes has been well controlled A1c 6.9 measured December 7, 2022  Lipids controlled measured same date, LDL 40, triglycerides 126, HDL 31, total cholesterol 96    She has no physical limitations.  No medication allergies known.  Physical exam with clear oropharynx, clear sounding lungs, heart regular rate rhythm with no murmur, abdomen nontender, extremities no edema or tenderness, steady stable gait.    Her usual PCP is Akilah Navarro PA-C.  Chioma needs to have a follow-up visit with Akilah in June 2023, last visit was in March 2023.    For the [employer] form, Chioma has no restrictions.  She is excited to start swimming.    I will include Chioma's vaccination history in her after visit summary form, and also include a medication list.  We added as needed medication which is Tylenol for aches pains, Robitussin-DM cough syrup for cough, and multivitamin daily as needed for nutrition    Chioma is able to self administer her medications.  She will get assistance from the foster home staff for medication reordering.    Immunization History   Administered Date(s) Administered    COVID-19 Bivalent 12+ (Pfizer) 09/22/2022    COVID-19 MONOVALENT 12+ (Pfizer) 03/22/2021, 04/12/2021, 11/03/2021    DT (PEDS <7y) 12/21/2004    FLU 6-35 months 11/18/2009, 09/29/2010, 10/04/2013     Flu, Unspecified 11/17/2007, 10/25/2008, 11/18/2011, 11/01/2012    HepA, Unspecified 09/06/2004, 03/15/2007, 08/29/2008    Hepatitis A (ADULT 19+) 09/06/2004, 03/15/2007, 08/29/2008    Influenza (IIV3) PF 11/22/2005, 11/21/2006, 11/17/2007, 10/25/2008, 11/18/2011, 09/19/2012, 11/01/2012, 10/03/2013    Influenza Vaccine >6 months (Alfuria,Fluzone) 09/06/2019, 09/08/2021, 09/22/2022    Influenza Vaccine, 6+MO IM (QUADRIVALENT W/PRESERVATIVES) 09/01/2016, 09/20/2017    Influenza,INJ,MDCK,PF,Quad >6mo(Flucelvax) 10/16/2018, 10/08/2020    Pneumococcal 20 valent Conjugate (Prevnar 20) 03/21/2023    Pneumococcal 23 valent 12/22/2011    TD,PF 7+ (Tenivac) 01/16/2019    TDAP (Adacel,Boostrix) 08/29/2008    Td,adult,historic,unspecified 08/29/2008

## 2023-06-13 ENCOUNTER — VIRTUAL VISIT (OUTPATIENT)
Dept: FAMILY MEDICINE | Facility: CLINIC | Age: 48
End: 2023-06-13
Payer: MEDICARE

## 2023-06-13 DIAGNOSIS — Q90.9 DOWN'S SYNDROME: ICD-10-CM

## 2023-06-13 DIAGNOSIS — E78.5 DYSLIPIDEMIA: ICD-10-CM

## 2023-06-13 DIAGNOSIS — Z00.00 HEALTHCARE MAINTENANCE: ICD-10-CM

## 2023-06-13 DIAGNOSIS — Z12.11 SCREEN FOR COLON CANCER: ICD-10-CM

## 2023-06-13 DIAGNOSIS — E11.9 TYPE 2 DIABETES MELLITUS WITHOUT COMPLICATION, WITHOUT LONG-TERM CURRENT USE OF INSULIN (H): Primary | ICD-10-CM

## 2023-06-13 PROCEDURE — 99442 PR PHYSICIAN TELEPHONE EVALUATION 11-20 MIN: CPT | Mod: 95 | Performed by: FAMILY MEDICINE

## 2023-06-13 RX ORDER — SIMVASTATIN 10 MG
10 TABLET ORAL AT BEDTIME
Qty: 90 TABLET | Refills: 1 | Status: SHIPPED | OUTPATIENT
Start: 2023-06-13 | End: 2024-05-28

## 2023-06-13 RX ORDER — CALCIUM CARBONATE/VITAMIN D3 500MG-5MCG
1 TABLET ORAL 2 TIMES DAILY WITH MEALS
Qty: 60 TABLET | Refills: 11 | Status: SHIPPED | OUTPATIENT
Start: 2023-06-13 | End: 2024-06-24
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
## 2023-06-13 NOTE — LETTER
June 13, 2023      Chioma Mccormick  [address] [address] [address]        To Whom It May Concern,       Chioma Mccormick is followed in our offices for primary care. She has the diagnosis of Down Syndrome and mild intellectual disability.     Chioma is not capable of caring for her own social security income and would benefit from a representative payee.       Sincerely,        Malathi Martinez MD

## 2023-06-14 ENCOUNTER — LAB (OUTPATIENT)
Dept: LAB | Facility: CLINIC | Age: 48
End: 2023-06-14
Payer: MEDICARE

## 2023-06-14 DIAGNOSIS — E11.65 TYPE 2 DIABETES MELLITUS WITH HYPERGLYCEMIA, WITHOUT LONG-TERM CURRENT USE OF INSULIN (H): Primary | ICD-10-CM

## 2023-06-14 DIAGNOSIS — E11.9 TYPE 2 DIABETES MELLITUS WITHOUT COMPLICATION, WITHOUT LONG-TERM CURRENT USE OF INSULIN (H): ICD-10-CM

## 2023-06-14 LAB
ANION GAP SERPL CALCULATED.3IONS-SCNC: 12 MMOL/L (ref 7–15)
BUN SERPL-MCNC: 11.7 MG/DL (ref 6–20)
CALCIUM SERPL-MCNC: 8.8 MG/DL (ref 8.6–10)
CHLORIDE SERPL-SCNC: 102 MMOL/L (ref 98–107)
CREAT SERPL-MCNC: 0.67 MG/DL (ref 0.51–0.95)
CREAT UR-MCNC: 92.7 MG/DL
DEPRECATED HCO3 PLAS-SCNC: 25 MMOL/L (ref 22–29)
GFR SERPL CREATININE-BSD FRML MDRD: >90 ML/MIN/1.73M2
GLUCOSE SERPL-MCNC: 332 MG/DL (ref 70–99)
HBA1C MFR BLD: 8.9 % (ref 0–5.6)
MICROALBUMIN UR-MCNC: <12 MG/L
MICROALBUMIN/CREAT UR: NORMAL MG/G{CREAT}
POTASSIUM SERPL-SCNC: 4.4 MMOL/L (ref 3.4–5.3)
SODIUM SERPL-SCNC: 139 MMOL/L (ref 136–145)

## 2023-06-14 PROCEDURE — 82570 ASSAY OF URINE CREATININE: CPT

## 2023-06-14 PROCEDURE — 82043 UR ALBUMIN QUANTITATIVE: CPT

## 2023-06-14 PROCEDURE — 36415 COLL VENOUS BLD VENIPUNCTURE: CPT

## 2023-06-14 PROCEDURE — 80048 BASIC METABOLIC PNL TOTAL CA: CPT

## 2023-06-14 PROCEDURE — 83036 HEMOGLOBIN GLYCOSYLATED A1C: CPT

## 2023-06-14 NOTE — LETTER
Elizabeth 15, 2023      Chioma NICHELLE [place]  [address] [address] [address]        Dear ,    We are writing to inform you of your test results.    Chioma,     It was a pleasure meeting you at your recent visit. Your labs have been reviewed and are attached.     Unfortunately, your diabetes A1c test, now shows your diabetes is poorly controlled.  The glucose at the time of your blood draw was 332.   The rest of the kidney and electrolyte panel was normal.     I would recommend meeting with one of our diabetic educators to review your diabetes care.  A  will contact you to set up this appointment.   Make sure you are taking your metformin consistently.     Please also keep the appointment you have already scheduled later this month also to talk about new medications to control your diabetes.     Please MyChart or call if you have any concerns or questions.     Resulted Orders   Basic metabolic panel  (Ca, Cl, CO2, Creat, Gluc, K, Na, BUN)   Result Value Ref Range    Sodium 139 136 - 145 mmol/L    Potassium 4.4 3.4 - 5.3 mmol/L    Chloride 102 98 - 107 mmol/L    Carbon Dioxide (CO2) 25 22 - 29 mmol/L    Anion Gap 12 7 - 15 mmol/L    Urea Nitrogen 11.7 6.0 - 20.0 mg/dL    Creatinine 0.67 0.51 - 0.95 mg/dL    Calcium 8.8 8.6 - 10.0 mg/dL    Glucose 332 (H) 70 - 99 mg/dL    GFR Estimate >90 >60 mL/min/1.73m2      Comment:      eGFR calculated using 2021 CKD-EPI equation.   Hemoglobin A1c   Result Value Ref Range    Hemoglobin A1C 8.9 (H) 0.0 - 5.6 %    Narrative    Results confirmed by repeat test. TJ     Albumin Random Urine Quantitative with Creat Ratio   Result Value Ref Range    Creatinine Urine mg/dL 92.7 mg/dL      Comment:      The reference ranges have not been established in urine creatinine. The results should be integrated into the clinical context for interpretation.    Albumin Urine mg/L <12.0 mg/L      Comment:      The reference ranges have not been established in urine albumin. The  results should be integrated into the clinical context for interpretation.    Albumin Urine mg/g Cr        Comment:      Unable to calculate, urine albumin and/or urine creatinine is outside detectable limits.  Microalbuminuria is defined as an albumin:creatinine ratio of 17 to 299 for males and 25 to 299 for females. A ratio of albumin:creatinine of 300 or higher is indicative of overt proteinuria.  Due to biologic variability, positive results should be confirmed by a second, first-morning random or 24-hour timed urine specimen. If there is discrepancy, a third specimen is recommended. When 2 out of 3 results are in the microalbuminuria range, this is evidence for incipient nephropathy and warrants increased efforts at glucose control, blood pressure control, and institution of therapy with an angiotensin-converting-enzyme (ACE) inhibitor (if the patient can tolerate it).         If you have any questions or concerns, please call the clinic at the number listed above.       Sincerely,      Malathi Martinez MD

## 2023-06-14 NOTE — RESULT ENCOUNTER NOTE
Please send the following in a letter to patient and enclose copy of results:     Chioma,    It was a pleasure meeting you at your recent visit. Your labs have been reviewed and are attached.     Unfortunately, your diabetes A1c test, now shows your diabetes is poorly controlled.  The glucose at the time of your blood draw was 332.  The rest of the kidney and electrolyte panel was normal.    I would recommend meeting with one of our diabetic educators to review your diabetes care.  A  will contact you to set up this appointment.   Make sure you are taking your metformin consistently.    Please also keep the appointment you have already scheduled later this month also to talk about new medications to control your diabetes.    Please MyChart or call if you have any concerns or questions.     Kind regards,    Malathi Martinez MD

## 2023-06-27 ENCOUNTER — VIRTUAL VISIT (OUTPATIENT)
Dept: FAMILY MEDICINE | Facility: CLINIC | Age: 48
End: 2023-06-27
Payer: MEDICARE

## 2023-06-27 DIAGNOSIS — F41.1 ANXIETY STATE: Primary | ICD-10-CM

## 2023-06-27 DIAGNOSIS — E11.65 TYPE 2 DIABETES MELLITUS WITH HYPERGLYCEMIA, WITHOUT LONG-TERM CURRENT USE OF INSULIN (H): ICD-10-CM

## 2023-06-27 PROCEDURE — 99213 OFFICE O/P EST LOW 20 MIN: CPT | Mod: 95 | Performed by: PHYSICIAN ASSISTANT

## 2023-06-27 RX ORDER — SERTRALINE HYDROCHLORIDE 25 MG/1
25 TABLET, FILM COATED ORAL DAILY
Qty: 90 TABLET | Refills: 1 | Status: SHIPPED | OUTPATIENT
Start: 2023-06-27 | End: 2023-09-01

## 2023-06-27 NOTE — PROGRESS NOTES
Chioma is a 48 year old who is being evaluated via a billable telephone visit.      What phone number would you like to be contacted at? [phone]  How would you like to obtain your AVS? Kenn    Distant Location (provider location):  On-site    Assessment & Plan     Anxiety  Worsening, will begin zoloft  - sertraline (ZOLOFT) 25 MG tablet  Dispense: 90 tablet; Refill: 1    Type 2 diabetes mellitus with hyperglycemia, without long-term current use of insulin (H)  Worsened, has appt scheduled with diabetes education and is taking metformin daily               Akilah Navarro PA-C  [facility]   Chioma is a 48 year old, presenting for the following health issues:  Recheck Medication and Anxiety        6/27/2023     4:45 PM   Additional Questions   Roomed by Wally LANE     Living with Evelyn at an adult foster care home since 6/1/2023, reports she likes her new living situation and is keeping busy.  She is working at [employer] 25 hours per week    Notes she is having some anxiety.  She gets fixated on an activity and asks repeatedly when they are going or if they are still going, she waits for the bus, staring out the window starting at 7 or 7:15 even though it doesn't come until at least 7:30.   Evelyn feels Chioma seems uncomfortable.    Diabetes- taking metformin and has an appt with diabetes education in near future.  Chioma reports she likes to exercise and exercises often      Review of Systems   Constitutional, HEENT, cardiovascular, pulmonary, gi and gu systems are negative, except as otherwise noted.      Objective           Vitals:  No vitals were obtained today due to virtual visit.    Physical Exam   healthy, alert and no distress  PSYCH: Alert and oriented times 3; coherent speech, normal   rate and volume, able to articulate logical thoughts, able   to abstract reason, no tangential thoughts, no hallucinations   or delusions  Her affect is  normal  RESP: No cough, no audible wheezing, able to talk in full sentences  Remainder of exam unable to be completed due to telephone visits          Phone call duration: 15 minutes

## 2023-07-07 ENCOUNTER — VIRTUAL VISIT (OUTPATIENT)
Dept: EDUCATION SERVICES | Facility: CLINIC | Age: 48
End: 2023-07-07
Attending: FAMILY MEDICINE
Payer: MEDICARE

## 2023-07-07 DIAGNOSIS — E11.65 TYPE 2 DIABETES MELLITUS WITH HYPERGLYCEMIA, WITHOUT LONG-TERM CURRENT USE OF INSULIN (H): ICD-10-CM

## 2023-07-07 PROCEDURE — G0108 DIAB MANAGE TRN  PER INDIV: HCPCS | Mod: 95 | Performed by: DIETITIAN, REGISTERED

## 2023-07-07 NOTE — PROGRESS NOTES
Diabetes Self-Management Education & Support    Presents for: Individual review    Type of Service: Telephone Visit    Originating Location (Patient Location): Home  Distant Location (Provider Location): Offsite  Mode of Communication:  Telephone    Telephone Visit Start Time: 8:30 AM  Telephone Visit End Time (telephone visit stop time): 9:04 AM    How would patient like to obtain AVS? Kenn    Assessment Type:   ASSESSMENT:  Met with patient and her adult foster care provider - Evelyn. We discussed food choices and how to modify her po intake for healthier meals. Patient enjoys going to the gym and goes for at least an hour, plus walks every night. She uses a sticker/reward chart and that helps keep her active.  We discussed small changes to her food choices - she likes to eat a lot of fruit, so we discussed appropriate servings and ways to incorporate more protein with snacks and meals. Patient does not want to increase metformin at this time. She recently moved in with Evelyn and Evelyn feels patient is eating healthier now and making good changes.    Patient checks her BG once a week fasting and they are always around 110-115 mg/dL - sometimes checks later in the day are they are in the same range. Chioma tolerates metformin well. Discussed food labels and importance of monitoring portion sizes.    Patient's most recent   Lab Results   Component Value Date    A1C 8.9 06/14/2023     is not meeting goal of <7.0    Diabetes knowledge and skills assessment:   Patient is knowledgeable in diabetes management concepts related to: Healthy Eating, Being Active, Monitoring, Taking Medication, Problem Solving, Reducing Risks and Healthy Coping    Continue education with the following diabetes management concepts: graduated    Based on learning assessment above, most appropriate setting for further diabetes education would be: Individual setting.      PLAN  1. Check BG once or twice a week at varying times  2.  "Monitor po intake aiming for well balanced meals and snacks  3. Continue Metformin at 1,000 mg/day - would like to increase to 2,000 mg/day at next A1C check if not at goal.   4. Continue to be active  Topics to cover at upcoming visits: graduated -pt to follow up as preferred by pt    Follow-up: 3-6 months. Patient preferred not to schedule a follow up until after her MD visit and A1C recheck    See Care Plan for co-developed, patient-state behavior change goals.  AVS provided for patient today.    Education Materials Provided:  [employer] Healthy Living with Diabetes Book and My Plate Planner - will mail       SUBJECTIVE/OBJECTIVE:  Presents for: Individual review  Accompanied by: Evelyn Esparza from adult foster care  Diabetes education in the past 24mo: No  Focus of Visit: Monitoring  Diabetes type: Type 2  Cultural Influences/Ethnic Background:  Not  or       Diabetes Symptoms & Complications:  Fatigue: No  Neuropathy: No  Polydipsia: No  Polyphagia: No  Polyuria: No  Visual change: No  Slow healing wounds: No  Symptom course: Stable  Weight trend: Stable  Complications assessed today?: No    Patient Problem List and Family Medical History reviewed for relevant medical history, current medical status, and diabetes risk factors.    Vitals:  There were no vitals taken for this visit.  Estimated body mass index is 30.98 kg/m  as calculated from the following:    Height as of 5/26/23: 1.499 m (4' 11\").    Weight as of 5/26/23: 69.6 kg (153 lb 6.4 oz).   Last 3 BP:   BP Readings from Last 3 Encounters:   05/26/23 122/66   03/21/23 126/70   12/07/22 128/74       History   Smoking Status     Never   Smokeless Tobacco     Never       Labs:  Lab Results   Component Value Date    A1C 8.9 06/14/2023     Lab Results   Component Value Date     06/14/2023     02/23/2022     Lab Results   Component Value Date    LDL 40 12/07/2022     Direct Measure HDL   Date Value Ref Range Status "   12/07/2022 31 (L) >=50 mg/dL Final   ]  GFR Estimate   Date Value Ref Range Status   06/14/2023 >90 >60 mL/min/1.73m2 Final     Comment:     eGFR calculated using 2021 CKD-EPI equation.   02/09/2021 >60 >60 mL/min/1.73m2 Final     GFR Estimate If Black   Date Value Ref Range Status   02/09/2021 >60 >60 mL/min/1.73m2 Final     Lab Results   Component Value Date    CR 0.67 06/14/2023     No results found for: MICROALBUMIN    Healthy Eating:  Healthy Eating Assessed Today: Yes  Meal planning/habits: None  Meals include: Breakfast, Lunch, Dinner  Breakfast: bagel with cream cheese - eats the whole  Lunch: tuna wraps, keto bread or buns with lettuce  Dinner: eats at [employer] sometimes when she is working  Snacks: cuties, grapes, strawberries, carrots, popcorn, trail mix  Other: quest bars (4 gram), ICE water  Beverages: Juice, Water (Ocean spray sugar free)  Has patient met with a dietitian in the past?: No    Being Active:  Being Active Assessed Today: Yes  Exercise:: Yes  Days per week of moderate to strenuous exercise (like a brisk walk): 7  On average, minutes per day of exercise at this level: 30  How intense was your typical exercise? : Moderate (like brisk walking)  Exercise Minutes per Week: 210    Monitoring:  Monitoring Assessed Today: Yes  Did patient bring glucose meter to appointment? : Yes  Blood Glucose Meter: ContourNext  Times checking blood sugar at home (number): 1  Times checking blood sugar at home (per): Week  Blood glucose trend: No change      Taking Medications:  Diabetes Medication(s)     Biguanides       metFORMIN (GLUCOPHAGE) 1000 MG tablet    Take 1 tablet (1,000 mg) by mouth daily (with dinner)          Taking Medication Assessed Today: Yes  Current Treatments: Oral Medication (taken by mouth)  Problems taking diabetes medications regularly?: Yes    Problem Solving:  Problem Solving Assessed Today: Yes              Reducing Risks:  Reducing Risks Assessed Today: Yes  Diabetes Risks: Age  over 45 years  CAD Risks: Diabetes Mellitus, Obesity  Has dilated eye exam at least once a year?: Yes  Sees dentist every 6 months?: Yes  Feet checked by healthcare provider in the last year?: No    Healthy Coping:  Healthy Coping Assessed Today: Yes  Emotional response to diabetes: Ready to learn  Informal Support system:: Other (adult foster care)  Stage of change: ACTION (Actively working towards change)  Patient Activation Measure Survey Score:       No data to display                  Care Plan and Education Provided:  Care Plan: Diabetes   Updates made by Lauren Marie RD since 7/7/2023 12:00 AM      Problem: HbA1C Not In Goal       Goal: Establish Regular Follow-Ups with PCP       Task: Discuss with PCP the recommended timing for patient's next follow up visit(s)    Responsible User: Lauren Marie RD      Task: Discuss schedule for PCP visits with patient    Responsible User: Lauren Marie RD      Goal: Get HbA1C Level in Goal       Task: Educate patient on diabetes education self-management topics    Responsible User: Lauren Marie RD      Task: Educate patient on benefits of regular glucose monitoring    Responsible User: Lauren Marie RD      Task: Refer patient to appropriate extended care team member, as needed (Medication Therapy Management, Behavioral Health, Physical Therapy, etc.)    Responsible User: Lauren Marie RD      Task: Discuss diabetes treatment plan with patient    Responsible User: Lauren Marie RD      Problem: Diabetes Self-Management Education Needed to Optimize Self-Care Behaviors       Goal: Understand diabetes pathophysiology and disease progression       Task: Provide education on diabetes pathophysiology and disease progression specfic to patient's diabetes type    Responsible User: Lauren Marie RD      Goal: Healthy Eating - follow a healthy eating pattern for diabetes    This Visit's Progress: 50%   Note:    Monitor po  intake aiming for 3 meals daily of 45 grams of carbohydrates/meal and limit snacks to 15-30 grams per snack       Task: Provide education on portion control and consistency in amount, composition and timing of food intake Completed 7/7/2023   Responsible User: Lauren Marie RD      Task: Provide education on managing carbohydrate intake (carbohydrate counting, plate planning method, etc.) Completed 7/7/2023   Responsible User: Lauren Marie RD      Task: Provide education on weight management    Responsible User: Lauren Marie RD      Task: Provide education on heart healthy eating    Responsible User: Lauren Marie RD      Task: Provide education on eating out    Responsible User: Lauren Marie RD      Task: Develop individualized healthy eating plan with patient    Responsible User: Lauren Marie RD      Goal: Being Active - get regular physical activity, working up to at least 150 minutes per week    This Visit's Progress: 100%   Note:    Be active 30 minutes/day 5 days a week     Task: Provide education on relationship of activity to glucose and precautions to take if at risk for low glucose    Responsible User: Lauren Marie RD      Task: Discuss barriers to physical activity with patient    Responsible User: Lauren Marie RD      Task: Develop physical activity plan with patient Completed 7/7/2023   Responsible User: Lauren Marie RD      Task: Explore community resources including walking groups, assistance programs, and home videos    Responsible User: Lauren Marie RD      Goal: Monitoring - monitor glucose and ketones as directed    This Visit's Progress: 100%   Note:    Check BG 1-2 times/week     Task: Provide education on blood glucose monitoring (purpose, proper technique, frequency, glucose targets, interpreting results, when to use glucose control solution, sharps disposal)    Responsible User: Lauren Marie RD      Task:  Provide education on continuous glucose monitoring (sensor placement, use of jolanta or /reader, understanding glucose trends, alerts and alarms, differences between sensor glucose and blood glucose)    Responsible User: Lauren Marie RD      Task: Provide education on ketone monitoring (when to monitor, frequency, etc.)    Responsible User: Lauren Marie RD      Goal: Taking Medication - patient is consistently taking medications as directed       Task: Provide education on action of prescribed medication, including when to take and possible side effects Completed 7/7/2023   Responsible User: Lauren Marie RD      Task: Provide education on insulin and injectable diabetes medications, including administration, storage, site selection and rotation for injection sites    Responsible User: Lauren Marie RD      Task: Discuss barriers to medication adherence with patient and provide management technique ideas as appropriate    Responsible User: Lauren Marie RD      Task: Provide education on frequency and refill details of medications    Responsible User: Lauren Marie RD      Goal: Problem Solving - know how to prevent and manage short-term diabetes complications       Task: Provide education on high blood glucose - causes, signs/symptoms, prevention and treatment Completed 7/7/2023   Responsible User: Lauren Marie RD      Task: Provide education on low blood glucose - causes, signs/symptoms, prevention, treatment, carrying a carbohydrate source at all times, and medical identification Completed 7/7/2023   Responsible User: Lauren Marie RD      Task: Provide education on safe travel with diabetes    Responsible User: Lauren Marie RD      Task: Provide education on how to care for diabetes on sick days    Responsible User: Lauren Marie RD      Task: Provide education on when to call a health care provider    Responsible User: Cynthia  Lauren STEWARD RD      Goal: Reducing Risks - know how to prevent and treat long-term diabetes complications       Task: Provide education on major complications of diabetes, prevention, early diagnostic measures and treatment of complications Completed 7/7/2023   Responsible User: Lauren Marie RD      Task: Provide education on recommended care for dental, eye and foot health Completed 7/7/2023   Responsible User: Lauren Marie RD      Task: Provide education on Hemoglobin A1c - goals and relationship to blood glucose levels Completed 7/7/2023   Responsible User: Lauren Marie RD      Task: Provide education on recommendations for heart health - lipid levels and goals, blood pressure and goals, and aspirin therapy, if indicated    Responsible User: Lauren Marie RD      Task: Provide education on tobacco cessation    Responsible User: Lauren Marie RD      Goal: Healthy Coping - use available resources to cope with the challenges of managing diabetes       Task: Discuss recognizing feelings about having diabetes Completed 7/7/2023   Responsible User: Lauren Marie RD      Task: Provide education on the benefits of making appropriate lifestyle changes    Responsible User: Lauren Marie RD      Task: Provide education on benefits of utilizing support systems    Responsible User: Lauren Marie RD      Task: Discuss methods for coping with stress    Responsible User: Lauren Marie RD      Task: Provide education on when to seek professional counseling    Responsible User: Lauren Marie RD            Time Spent: 30 minutes  Encounter Type: Individual    Any diabetes medication dose changes were made via the CDE Protocol per the patient's referring provider. A copy of this encounter was shared with the provider.

## 2023-07-07 NOTE — PATIENT INSTRUCTIONS
Called patient no answer left voicemail regarding she will need to connect the place she got her cpap from to reschedule her appt.    Goals for Diabetes Care:    1. Eat 3 balanced meals each day - Monitor carb intake and aim for 45 grams per meal  This would be equal to about 4 choices of carbohydrates. Carbohydrate 1 choice = 15 grams  Aim to eat the plate method style - half your plate fruits/veggies, 1/4 the plate protein and 1/4 plate starch (rice, potato, pasta)    2. Check blood sugars at least 1-2 times a week at varying times   Blood Glucose Targets:   1. Fasting and before meal target is 80 - 130   2. 2 hours after a meal target is < 180  Always remember to bring meter and log book to all appointments.    3. Activity really helps improve blood sugars. Try to Incorporate 30 minutes activity into each day - does not need to be all at one time & walking counts!    4. Treating low BG. Rule of 15 = BG 50-70 mg/dL = 15 gram carb (4 oz juice, 4 glucose tabs, OR 4 oz pop), then recheck BG in 15 minutes. If still low repeat. (If BG <50 mg/dL = 8 oz pop/juice or 8 glucose tabs).    5. Take diabetes medications as prescribed   -Metformin 1,000 mg/day    Follow up with your Diabetic Educator to assess BG targets and need for modifications to medications and/or lifestyle.    Call with any questions.  Thank you!  Lauren Marie RDN, LD, [facility]   Certified Diabetes Care &   [facility]    Triage [phone] or Scheduling [phone]

## 2023-07-07 NOTE — LETTER
7/7/2023         RE: Chioma Mccormick  [address] [address] [address]        Dear Colleague,    Thank you for referring your patient, Chioma Mccormick, to the [facility]. Please see a copy of my visit note below.    Diabetes Self-Management Education & Support    Presents for: Individual review    Type of Service: Telephone Visit    Originating Location (Patient Location): Home  Distant Location (Provider Location): Offsite  Mode of Communication:  Telephone    Telephone Visit Start Time: 8:30 AM  Telephone Visit End Time (telephone visit stop time): 9:04 AM    How would patient like to obtain AVS? MyChart    Assessment Type:   ASSESSMENT:  Met with patient and her adult foster care provider - Evelyn. We discussed food choices and how to modify her po intake for healthier meals. Patient enjoys going to the gym and goes for at least an hour, plus walks every night. She uses a sticker/reward chart and that helps keep her active.  We discussed small changes to her food choices - she likes to eat a lot of fruit, so we discussed appropriate servings and ways to incorporate more protein with snacks and meals. Patient does not want to increase metformin at this time. She recently moved in with Evelyn and Evelyn feels patient is eating healthier now and making good changes.    Patient checks her BG once a week fasting and they are always around 110-115 mg/dL - sometimes checks later in the day are they are in the same range. Chioma tolerates metformin well. Discussed food labels and importance of monitoring portion sizes.    Patient's most recent   Lab Results   Component Value Date    A1C 8.9 06/14/2023     is not meeting goal of <7.0    Diabetes knowledge and skills assessment:   Patient is knowledgeable in diabetes management concepts related to: Healthy Eating, Being Active, Monitoring, Taking Medication, Problem Solving, Reducing Risks and Healthy Coping    Continue education with  "the following diabetes management concepts: graduated    Based on learning assessment above, most appropriate setting for further diabetes education would be: Individual setting.      PLAN  1. Check BG once or twice a week at varying times  2. Monitor po intake aiming for well balanced meals and snacks  3. Continue Metformin at 1,000 mg/day - would like to increase to 2,000 mg/day at next A1C check if not at goal.   4. Continue to be active  Topics to cover at upcoming visits: graduated -pt to follow up as preferred by pt    Follow-up: 3-6 months. Patient preferred not to schedule a follow up until after her MD visit and A1C recheck    See Care Plan for co-developed, patient-state behavior change goals.  AVS provided for patient today.    Education Materials Provided:  [employer] Healthy Living with Diabetes Book and My Plate Planner - will mail       SUBJECTIVE/OBJECTIVE:  Presents for: Individual review  Accompanied by: Evelyn Esparza from adult foster care  Diabetes education in the past 24mo: No  Focus of Visit: Monitoring  Diabetes type: Type 2  Cultural Influences/Ethnic Background:  Not  or       Diabetes Symptoms & Complications:  Fatigue: No  Neuropathy: No  Polydipsia: No  Polyphagia: No  Polyuria: No  Visual change: No  Slow healing wounds: No  Symptom course: Stable  Weight trend: Stable  Complications assessed today?: No    Patient Problem List and Family Medical History reviewed for relevant medical history, current medical status, and diabetes risk factors.    Vitals:  There were no vitals taken for this visit.  Estimated body mass index is 30.98 kg/m  as calculated from the following:    Height as of 5/26/23: 1.499 m (4' 11\").    Weight as of 5/26/23: 69.6 kg (153 lb 6.4 oz).   Last 3 BP:   BP Readings from Last 3 Encounters:   05/26/23 122/66   03/21/23 126/70   12/07/22 128/74       History   Smoking Status     Never   Smokeless Tobacco     Never       Labs:  Lab Results "   Component Value Date    A1C 8.9 06/14/2023     Lab Results   Component Value Date     06/14/2023     02/23/2022     Lab Results   Component Value Date    LDL 40 12/07/2022     Direct Measure HDL   Date Value Ref Range Status   12/07/2022 31 (L) >=50 mg/dL Final   ]  GFR Estimate   Date Value Ref Range Status   06/14/2023 >90 >60 mL/min/1.73m2 Final     Comment:     eGFR calculated using 2021 CKD-EPI equation.   02/09/2021 >60 >60 mL/min/1.73m2 Final     GFR Estimate If Black   Date Value Ref Range Status   02/09/2021 >60 >60 mL/min/1.73m2 Final     Lab Results   Component Value Date    CR 0.67 06/14/2023     No results found for: MICROALBUMIN    Healthy Eating:  Healthy Eating Assessed Today: Yes  Meal planning/habits: None  Meals include: Breakfast, Lunch, Dinner  Breakfast: bagel with cream cheese - eats the whole  Lunch: tuna wraps, keto bread or buns with lettuce  Dinner: eats at [employer] sometimes when she is working  Snacks: cuties, grapes, strawberries, carrots, popcorn, trail mix  Other: quest bars (4 gram), ICE water  Beverages: Juice, Water (Ocean spray sugar free)  Has patient met with a dietitian in the past?: No    Being Active:  Being Active Assessed Today: Yes  Exercise:: Yes  Days per week of moderate to strenuous exercise (like a brisk walk): 7  On average, minutes per day of exercise at this level: 30  How intense was your typical exercise? : Moderate (like brisk walking)  Exercise Minutes per Week: 210    Monitoring:  Monitoring Assessed Today: Yes  Did patient bring glucose meter to appointment? : Yes  Blood Glucose Meter: ContourNext  Times checking blood sugar at home (number): 1  Times checking blood sugar at home (per): Week  Blood glucose trend: No change      Taking Medications:  Diabetes Medication(s)     Biguanides       metFORMIN (GLUCOPHAGE) 1000 MG tablet    Take 1 tablet (1,000 mg) by mouth daily (with dinner)          Taking Medication Assessed Today: Yes  Current  Treatments: Oral Medication (taken by mouth)  Problems taking diabetes medications regularly?: Yes    Problem Solving:  Problem Solving Assessed Today: Yes              Reducing Risks:  Reducing Risks Assessed Today: Yes  Diabetes Risks: Age over 45 years  CAD Risks: Diabetes Mellitus, Obesity  Has dilated eye exam at least once a year?: Yes  Sees dentist every 6 months?: Yes  Feet checked by healthcare provider in the last year?: No    Healthy Coping:  Healthy Coping Assessed Today: Yes  Emotional response to diabetes: Ready to learn  Informal Support system:: Other (adult foster care)  Stage of change: ACTION (Actively working towards change)  Patient Activation Measure Survey Score:       No data to display                  Care Plan and Education Provided:  Care Plan: Diabetes   Updates made by Lauren Marie RD since 7/7/2023 12:00 AM      Problem: HbA1C Not In Goal       Goal: Establish Regular Follow-Ups with PCP       Task: Discuss with PCP the recommended timing for patient's next follow up visit(s)    Responsible User: Lauren Marie RD      Task: Discuss schedule for PCP visits with patient    Responsible User: Lauren Marie RD      Goal: Get HbA1C Level in Goal       Task: Educate patient on diabetes education self-management topics    Responsible User: Lauren Marie RD      Task: Educate patient on benefits of regular glucose monitoring    Responsible User: Lauren Marie RD      Task: Refer patient to appropriate extended care team member, as needed (Medication Therapy Management, Behavioral Health, Physical Therapy, etc.)    Responsible User: Lauren Marie RD      Task: Discuss diabetes treatment plan with patient    Responsible User: Lauren Marie RD      Problem: Diabetes Self-Management Education Needed to Optimize Self-Care Behaviors       Goal: Understand diabetes pathophysiology and disease progression       Task: Provide education on diabetes  pathophysiology and disease progression specfic to patient's diabetes type    Responsible User: Lauren Marie RD      Goal: Healthy Eating - follow a healthy eating pattern for diabetes    This Visit's Progress: 50%   Note:    Monitor po intake aiming for 3 meals daily of 45 grams of carbohydrates/meal and limit snacks to 15-30 grams per snack       Task: Provide education on portion control and consistency in amount, composition and timing of food intake Completed 7/7/2023   Responsible User: Lauren Marie RD      Task: Provide education on managing carbohydrate intake (carbohydrate counting, plate planning method, etc.) Completed 7/7/2023   Responsible User: Lauren Marie RD      Task: Provide education on weight management    Responsible User: Lauren Marie RD      Task: Provide education on heart healthy eating    Responsible User: Lauren Marie RD      Task: Provide education on eating out    Responsible User: Lauren Marie RD      Task: Develop individualized healthy eating plan with patient    Responsible User: Lauren Marie RD      Goal: Being Active - get regular physical activity, working up to at least 150 minutes per week    This Visit's Progress: 100%   Note:    Be active 30 minutes/day 5 days a week     Task: Provide education on relationship of activity to glucose and precautions to take if at risk for low glucose    Responsible User: Lauren Marie RD      Task: Discuss barriers to physical activity with patient    Responsible User: Lauren Marie RD      Task: Develop physical activity plan with patient Completed 7/7/2023   Responsible User: Lauren Marie RD      Task: Explore community resources including walking groups, assistance programs, and home videos    Responsible User: Lauren Marie RD      Goal: Monitoring - monitor glucose and ketones as directed    This Visit's Progress: 100%   Note:    Check BG 1-2 times/week      Task: Provide education on blood glucose monitoring (purpose, proper technique, frequency, glucose targets, interpreting results, when to use glucose control solution, sharps disposal)    Responsible User: Lauren Marie RD      Task: Provide education on continuous glucose monitoring (sensor placement, use of jolanta or /reader, understanding glucose trends, alerts and alarms, differences between sensor glucose and blood glucose)    Responsible User: Lauren Marie RD      Task: Provide education on ketone monitoring (when to monitor, frequency, etc.)    Responsible User: Lauren Marie RD      Goal: Taking Medication - patient is consistently taking medications as directed       Task: Provide education on action of prescribed medication, including when to take and possible side effects Completed 7/7/2023   Responsible User: Lauren Marie RD      Task: Provide education on insulin and injectable diabetes medications, including administration, storage, site selection and rotation for injection sites    Responsible User: Lauren Marie RD      Task: Discuss barriers to medication adherence with patient and provide management technique ideas as appropriate    Responsible User: Lauren Marie RD      Task: Provide education on frequency and refill details of medications    Responsible User: Lauren Marie RD      Goal: Problem Solving - know how to prevent and manage short-term diabetes complications       Task: Provide education on high blood glucose - causes, signs/symptoms, prevention and treatment Completed 7/7/2023   Responsible User: Lauren Marie RD      Task: Provide education on low blood glucose - causes, signs/symptoms, prevention, treatment, carrying a carbohydrate source at all times, and medical identification Completed 7/7/2023   Responsible User: Lauren Marie RD      Task: Provide education on safe travel with diabetes    Responsible User:  Lauren Marie RD      Task: Provide education on how to care for diabetes on sick days    Responsible User: Lauren Marie RD      Task: Provide education on when to call a health care provider    Responsible User: Lauren Marie RD      Goal: Reducing Risks - know how to prevent and treat long-term diabetes complications       Task: Provide education on major complications of diabetes, prevention, early diagnostic measures and treatment of complications Completed 7/7/2023   Responsible User: Lauren Marie RD      Task: Provide education on recommended care for dental, eye and foot health Completed 7/7/2023   Responsible User: Lauren Marie RD      Task: Provide education on Hemoglobin A1c - goals and relationship to blood glucose levels Completed 7/7/2023   Responsible User: Lauren Marie RD      Task: Provide education on recommendations for heart health - lipid levels and goals, blood pressure and goals, and aspirin therapy, if indicated    Responsible User: Lauren Marie RD      Task: Provide education on tobacco cessation    Responsible User: Lauren Marie RD      Goal: Healthy Coping - use available resources to cope with the challenges of managing diabetes       Task: Discuss recognizing feelings about having diabetes Completed 7/7/2023   Responsible User: Lauren Marie RD      Task: Provide education on the benefits of making appropriate lifestyle changes    Responsible User: Lauren Marie RD      Task: Provide education on benefits of utilizing support systems    Responsible User: Lauren Marie RD      Task: Discuss methods for coping with stress    Responsible User: Lauren Marie RD      Task: Provide education on when to seek professional counseling    Responsible User: Lauren Marie RD            Time Spent: 30 minutes  Encounter Type: Individual    Any diabetes medication dose changes were made via the CDE Protocol  per the patient's referring provider. A copy of this encounter was shared with the provider.

## 2023-09-01 ENCOUNTER — VIRTUAL VISIT (OUTPATIENT)
Dept: FAMILY MEDICINE | Facility: CLINIC | Age: 48
End: 2023-09-01
Payer: MEDICARE

## 2023-09-01 ENCOUNTER — TELEPHONE (OUTPATIENT)
Dept: FAMILY MEDICINE | Facility: CLINIC | Age: 48
End: 2023-09-01

## 2023-09-01 DIAGNOSIS — E11.9 TYPE 2 DIABETES MELLITUS WITHOUT COMPLICATION, WITHOUT LONG-TERM CURRENT USE OF INSULIN (H): ICD-10-CM

## 2023-09-01 DIAGNOSIS — F41.1 ANXIETY STATE: Primary | ICD-10-CM

## 2023-09-01 PROCEDURE — 99213 OFFICE O/P EST LOW 20 MIN: CPT | Mod: 95 | Performed by: PHYSICIAN ASSISTANT

## 2023-09-01 NOTE — PROGRESS NOTES
Chioma is a 48 year old who is being evaluated via a billable telephone visit.      What phone number would you like to be contacted at? [phone]   How would you like to obtain your AVS? Kenn    Distant Location (provider location):  On-site    Assessment & Plan     Anxiety  - Adult Mental Health [facility] Referral  - sertraline (ZOLOFT) 50 MG tablet  Dispense: 90 tablet; Refill: 3    Letters written for emotional support animal and to take a lyft to and from work   Increase zoloft to 50 mg daily  Referral placed for psychiatry to evaluate as well    Type 2 diabetes mellitus without complication, without long-term current use of insulin (H)  Pt will have labs drawn and then follow up for visit  - Hemoglobin A1c; Future         Follow up 1-2 months for recheck    Akilah Navarro PA-C  [facility]   Chioma is a 48 year old, presenting for the following health issues:  Follow Up (Anxiety )      HPI     Anxiety- started sertraline and is tolerating 25 mg daily without side effects.  Anxiety is still high, Living with Evelyn at an adult foster care home since 6/1/2023, reports she likes her new living situation and is keeping busy. She is working at [employer] 25 hours per week does note that she is finds working with Evelyn's dog, Lolly helps in decreasing her anxiety.  She would like a note to make Lolly an emotional support animal.    Also notes a trigger for her anxiety is taking metro mobility and it gets there 15-20 minutes late sometimes and this is very stressful for her.  Would like to be able to take a lyft to and from work, would like a note today that she can give to the [facility].       Review of Systems   Constitutional, HEENT, cardiovascular, pulmonary, gi and gu systems are negative, except as otherwise noted.      Objective           Vitals:  No vitals were obtained today due to virtual visit.    Physical Exam   healthy, alert, and no distress  PSYCH:  Alert and oriented times 3; coherent speech, normal   rate and volume, able to articulate logical thoughts, able   to abstract reason, no tangential thoughts, no hallucinations   or delusions  Her affect is normal  RESP: No cough, no audible wheezing, able to talk in full sentences  Remainder of exam unable to be completed due to telephone visits                Phone call duration: 10 minutes

## 2023-09-01 NOTE — LETTER
September 1, 2023      Chioma Mccormick  [address] [address] [address]        To Whom It May Concern:    Chioma Mccormick was seen in our clinic.  Please allow her to have an emotional support dog for her anxiety.      Please contact me with any questions.       Sincerely,        Akilah Navarro PA-C

## 2023-09-01 NOTE — LETTER
September 1, 2023      Chioma Mccormick  [address] [address] [address]        To Whom It May Concern:    Chioma Mccormick was seen in our clinic. She suffers from anxiety and would benefit from taking a lyft to and from work.  She finds it very stressful to not be able to see when her ride will arrive and the lyft jolanta alleviates this stress for her.    Please contact me with any questions    Sincerely,        Akilah Navarro PA-C

## 2023-09-18 ENCOUNTER — LAB (OUTPATIENT)
Dept: LAB | Facility: CLINIC | Age: 48
End: 2023-09-18
Payer: MEDICARE

## 2023-09-18 DIAGNOSIS — Z11.59 NEED FOR HEPATITIS C SCREENING TEST: ICD-10-CM

## 2023-09-18 DIAGNOSIS — E11.9 TYPE 2 DIABETES MELLITUS WITHOUT COMPLICATION, WITHOUT LONG-TERM CURRENT USE OF INSULIN (H): ICD-10-CM

## 2023-09-18 DIAGNOSIS — Z11.4 SCREENING FOR HIV (HUMAN IMMUNODEFICIENCY VIRUS): Primary | ICD-10-CM

## 2023-09-18 LAB — HBA1C MFR BLD: 7.4 % (ref 0–5.6)

## 2023-09-18 PROCEDURE — 87389 HIV-1 AG W/HIV-1&-2 AB AG IA: CPT

## 2023-09-18 PROCEDURE — 86803 HEPATITIS C AB TEST: CPT

## 2023-09-18 PROCEDURE — 83036 HEMOGLOBIN GLYCOSYLATED A1C: CPT

## 2023-09-18 PROCEDURE — 36415 COLL VENOUS BLD VENIPUNCTURE: CPT

## 2023-09-20 LAB
HCV AB SERPL QL IA: NONREACTIVE
HIV 1+2 AB+HIV1 P24 AG SERPL QL IA: NONREACTIVE

## 2023-09-22 ENCOUNTER — VIRTUAL VISIT (OUTPATIENT)
Dept: FAMILY MEDICINE | Facility: CLINIC | Age: 48
End: 2023-09-22
Payer: MEDICARE

## 2023-09-22 DIAGNOSIS — F41.1 ANXIETY STATE: ICD-10-CM

## 2023-09-22 DIAGNOSIS — E11.9 TYPE 2 DIABETES MELLITUS WITHOUT COMPLICATION, WITHOUT LONG-TERM CURRENT USE OF INSULIN (H): Primary | ICD-10-CM

## 2023-09-22 PROCEDURE — 99213 OFFICE O/P EST LOW 20 MIN: CPT | Mod: 95 | Performed by: PHYSICIAN ASSISTANT

## 2023-09-22 NOTE — PROGRESS NOTES
"Chioma is a 48 year old who is being evaluated via a billable telephone visit.      What phone number would you like to be contacted at? [phone]   How would you like to obtain your AVS? Kenn    Distant Location (provider location):  On-site    Assessment & Plan     Type 2 diabetes mellitus without complication, without long-term current use of insulin (H)  A1C Improved from 8.9 to 7.4 in 3 months, has seen diabetes education and is making healthy diet choices and walking the dog daily    Anxiety  Not at goal  On zoloft 50 mg, has been at this dose for 3 weeks Chioma elects to stay on this dose for 3 more weeks to see if she has improvement in her symptoms will otherwise plan to go up on her dose in 3 weeks she will either schedule a visit or just message me if her symptoms are unchanged         BMI:   Estimated body mass index is 30.98 kg/m  as calculated from the following:    Height as of 5/26/23: 1.499 m (4' 11\").    Weight as of 5/26/23: 69.6 kg (153 lb 6.4 oz).   Weight management plan: Discussed healthy diet and exercise guidelines        Akilah Navarro PA-C  [facility]   Chioma is a 48 year old, presenting for the following health issues:  Follow Up (A1C)      HPI     Diabetes-she is taking her metformin daily denies side effects she has seen diabetes education and has working on home to [employer] to help with every meal she gets a choice on making decisions that are healthy or not so healthy and she tends to make healthy decisions she is walking the dog at least once every day denies polyuria or polydipsia    Anxiety-taking Zoloft 50 mg tolerating well notes her anxiety is still pretty high she recently went up 3 weeks ago from 25 to 50 mg.  Discussed options of going up on dose today versus waiting 3 weeks to reassess when she has had 6 weeks at this dose of medication      Review of Systems   Constitutional, HEENT, cardiovascular, pulmonary, gi and gu " systems are negative, except as otherwise noted.      Objective           Vitals:  No vitals were obtained today due to virtual visit.    Physical Exam   healthy, alert, and no distress  PSYCH: Alert and oriented times 3; coherent speech, normal   rate and volume, able to articulate logical thoughts, able   to abstract reason, no tangential thoughts, no hallucinations   or delusions  Her affect is normal  RESP: No cough, no audible wheezing, able to talk in full sentences  Remainder of exam unable to be completed due to telephone visits                Phone call duration: 10 minutes

## 2023-10-11 ENCOUNTER — IMMUNIZATION (OUTPATIENT)
Dept: NURSING | Facility: CLINIC | Age: 48
End: 2023-10-11
Payer: MEDICARE

## 2023-10-11 PROCEDURE — 91320 SARSCV2 VAC 30MCG TRS-SUC IM: CPT

## 2023-10-11 PROCEDURE — 90480 ADMN SARSCOV2 VAC 1/ONLY CMP: CPT

## 2023-10-12 ENCOUNTER — OFFICE VISIT (OUTPATIENT)
Dept: INTERNAL MEDICINE | Facility: CLINIC | Age: 48
End: 2023-10-12
Payer: MEDICARE

## 2023-10-12 ENCOUNTER — ANCILLARY PROCEDURE (OUTPATIENT)
Dept: GENERAL RADIOLOGY | Facility: CLINIC | Age: 48
End: 2023-10-12
Attending: INTERNAL MEDICINE
Payer: MEDICARE

## 2023-10-12 VITALS
HEART RATE: 61 BPM | DIASTOLIC BLOOD PRESSURE: 65 MMHG | RESPIRATION RATE: 16 BRPM | WEIGHT: 150.9 LBS | BODY MASS INDEX: 30.42 KG/M2 | OXYGEN SATURATION: 99 % | SYSTOLIC BLOOD PRESSURE: 99 MMHG | HEIGHT: 59 IN

## 2023-10-12 DIAGNOSIS — S89.91XA LEG INJURY, RIGHT, INITIAL ENCOUNTER: ICD-10-CM

## 2023-10-12 DIAGNOSIS — T14.8XXA SKIN ABRASION: ICD-10-CM

## 2023-10-12 DIAGNOSIS — Q90.9 DOWN'S SYNDROME: ICD-10-CM

## 2023-10-12 DIAGNOSIS — S89.91XA LEG INJURY, RIGHT, INITIAL ENCOUNTER: Primary | ICD-10-CM

## 2023-10-12 DIAGNOSIS — F70 MILD INTELLECTUAL DISABILITIES: ICD-10-CM

## 2023-10-12 PROCEDURE — 99213 OFFICE O/P EST LOW 20 MIN: CPT | Performed by: INTERNAL MEDICINE

## 2023-10-12 PROCEDURE — 73562 X-RAY EXAM OF KNEE 3: CPT | Mod: TC | Performed by: RADIOLOGY

## 2023-10-12 NOTE — PROGRESS NOTES
"  Assessment & Plan     Leg injury, right, initial encounter      Skin abrasion      Trisomy 21 (Down Syndrome)      Mild Intellectual Disabilities      49 yo female with Down syndrome and mild intellectual disabilities, who lives in the group home, fell yesterday morning while walking the dog. She fell after being bumped into from a reversing . She Scraped right knee, but was able to stand up, walk, and go to her job. She works at [employer] few hours every day.   She has mild swelling and pain and skin abrasion on the right knee. No bleeding, and sign of infection, Wound looks clean. Her gait is normal, she can squat. Pain mostly in abrasion area.    She is here today with her caregiver. She can go to work. Home care of the skin abrasion, ice, elevation discussed. We will get the xray of the knee since tender above patella.      Return in about 2 weeks (around 10/26/2023) for Follow up.            Kari Selby MD  [facility]    Roseanna Bedolla is a 48 year old, presenting for the following health issues:  Fall (Fell after being bumped into from a reversing  While Walking Dog- Bump/Scrape on knee)      10/12/2023    12:00 PM   Additional Questions   Roomed by Kailee Gardner                   Review of Systems         Objective    BP 99/65   Pulse 61   Resp 16   Ht 1.499 m (4' 11\")   Wt 68.4 kg (150 lb 14.4 oz)   SpO2 99%   BMI 30.48 kg/m    Body mass index is 30.48 kg/m .  Physical Exam                         "

## 2023-10-16 ENCOUNTER — NURSE TRIAGE (OUTPATIENT)
Dept: NURSING | Facility: CLINIC | Age: 48
End: 2023-10-16
Payer: MEDICARE
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
## 2023-10-17 ENCOUNTER — MYC MEDICAL ADVICE (OUTPATIENT)
Dept: FAMILY MEDICINE | Facility: CLINIC | Age: 48
End: 2023-10-17

## 2023-10-17 ENCOUNTER — VIRTUAL VISIT (OUTPATIENT)
Dept: FAMILY MEDICINE | Facility: CLINIC | Age: 48
End: 2023-10-17
Payer: MEDICARE

## 2023-10-17 DIAGNOSIS — T14.8XXA BRUISING: ICD-10-CM

## 2023-10-17 DIAGNOSIS — M25.561 ACUTE PAIN OF RIGHT KNEE: ICD-10-CM

## 2023-10-17 DIAGNOSIS — F41.1 ANXIETY STATE: Primary | ICD-10-CM

## 2023-10-17 DIAGNOSIS — R60.0 HAND EDEMA: ICD-10-CM

## 2023-10-17 PROCEDURE — 99214 OFFICE O/P EST MOD 30 MIN: CPT | Mod: 95 | Performed by: PHYSICIAN ASSISTANT

## 2023-10-17 RX ORDER — SERTRALINE HYDROCHLORIDE 100 MG/1
100 TABLET, FILM COATED ORAL DAILY
Qty: 90 TABLET | Refills: 1 | Status: SHIPPED | OUTPATIENT
Start: 2023-10-17 | End: 2023-11-03
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
## 2023-10-18 ENCOUNTER — HOSPITAL ENCOUNTER (OUTPATIENT)
Dept: ULTRASOUND IMAGING | Facility: HOSPITAL | Age: 48
Discharge: HOME | End: 2023-10-18
Attending: FAMILY MEDICINE
Payer: MEDICARE

## 2023-10-18 ENCOUNTER — HOSPITAL ENCOUNTER (OUTPATIENT)
Dept: RADIOLOGY | Facility: HOSPITAL | Age: 48
Discharge: HOME | End: 2023-10-18
Attending: FAMILY MEDICINE
Payer: MEDICARE

## 2023-10-18 ENCOUNTER — OFFICE VISIT (OUTPATIENT)
Dept: FAMILY MEDICINE | Facility: CLINIC | Age: 48
End: 2023-10-18
Payer: MEDICARE

## 2023-10-18 VITALS
SYSTOLIC BLOOD PRESSURE: 90 MMHG | OXYGEN SATURATION: 96 % | WEIGHT: 152.9 LBS | BODY MASS INDEX: 30.82 KG/M2 | DIASTOLIC BLOOD PRESSURE: 56 MMHG | TEMPERATURE: 98.9 F | HEIGHT: 59 IN | HEART RATE: 70 BPM | RESPIRATION RATE: 12 BRPM

## 2023-10-18 DIAGNOSIS — M79.89 LEFT ARM SWELLING: Primary | ICD-10-CM

## 2023-10-18 DIAGNOSIS — M79.89 LEFT ARM SWELLING: ICD-10-CM

## 2023-10-18 PROCEDURE — 73110 X-RAY EXAM OF WRIST: CPT | Mod: LT

## 2023-10-18 PROCEDURE — 93971 EXTREMITY STUDY: CPT | Mod: LT

## 2023-10-18 PROCEDURE — 99214 OFFICE O/P EST MOD 30 MIN: CPT | Performed by: FAMILY MEDICINE
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
## 2023-10-18 NOTE — ASSESSMENT & PLAN NOTE
Painless swelling of left forearm. Will get US to rule out DVT and if negative an xray of left wrist given recent fall. Elevated as much as possible. Warning signs and symptoms for return to clinic discussed.

## 2023-10-31 ENCOUNTER — TELEPHONE (OUTPATIENT)
Dept: FAMILY MEDICINE | Facility: CLINIC | Age: 48
End: 2023-10-31
Payer: MEDICARE

## 2023-10-31 NOTE — TELEPHONE ENCOUNTER
10-31-23  LM to call us to talk to [facility] Triage nurse regarding the swelling concern  &   Sent abram Hickey

## 2023-10-31 NOTE — TELEPHONE ENCOUNTER
Incoming call from caregiver Evelyn.     She states that patient has been seen twice for this hand swelling concern.     The swelling has not gotten worse, but Evelyn would like to make an appointment for follow up.     Pt is with Evelyn.     Chioma has been able to work, bowl with swelling.     Denies numbness, tingling, color changes to hand with swelling. Pt can still move fingers and hand without difficulties. Denies pain.     Educated if experiences any red flag symptoms to go to ED prior to appointment.     No further questions at this time

## 2023-10-31 NOTE — TELEPHONE ENCOUNTER
Reason for Call:  Other appointment    Detailed comments: pt is in need of a follow up for her hand. It is still swelling and they are concerned with the swelling. not painful, no apt till November 22.    please call and advise pt     Phone Number Patient can be reached at: Other phone number:  [phone]    Best Time: anytime    Can we leave a detailed message on this number? YES    Call taken on 10/31/2023 at 7:08 AM by Diogo Estevez

## 2023-11-01 ENCOUNTER — LAB (OUTPATIENT)
Dept: LAB | Facility: CLINIC | Age: 48
End: 2023-11-01
Payer: MEDICARE

## 2023-11-01 DIAGNOSIS — T14.8XXA BRUISING: ICD-10-CM

## 2023-11-01 LAB
ERYTHROCYTE [DISTWIDTH] IN BLOOD BY AUTOMATED COUNT: 12.1 % (ref 10–15)
HCT VFR BLD AUTO: 42.6 % (ref 35–47)
HGB BLD-MCNC: 14.1 G/DL (ref 11.7–15.7)
MCH RBC QN AUTO: 31.7 PG (ref 26.5–33)
MCHC RBC AUTO-ENTMCNC: 33.1 G/DL (ref 31.5–36.5)
MCV RBC AUTO: 96 FL (ref 78–100)
PLATELET # BLD AUTO: 172 10E3/UL (ref 150–450)
RBC # BLD AUTO: 4.45 10E6/UL (ref 3.8–5.2)
WBC # BLD AUTO: 6.2 10E3/UL (ref 4–11)

## 2023-11-01 PROCEDURE — 36415 COLL VENOUS BLD VENIPUNCTURE: CPT

## 2023-11-01 PROCEDURE — 85027 COMPLETE CBC AUTOMATED: CPT

## 2023-11-02 ENCOUNTER — OFFICE VISIT (OUTPATIENT)
Dept: FAMILY MEDICINE | Facility: CLINIC | Age: 48
End: 2023-11-02
Payer: MEDICARE

## 2023-11-02 VITALS
SYSTOLIC BLOOD PRESSURE: 122 MMHG | HEART RATE: 72 BPM | DIASTOLIC BLOOD PRESSURE: 68 MMHG | WEIGHT: 149.19 LBS | BODY MASS INDEX: 30.13 KG/M2 | OXYGEN SATURATION: 97 %

## 2023-11-02 DIAGNOSIS — R60.0 HAND EDEMA: Primary | ICD-10-CM

## 2023-11-02 PROCEDURE — 99213 OFFICE O/P EST LOW 20 MIN: CPT | Performed by: PHYSICIAN ASSISTANT

## 2023-11-02 RX ORDER — NAPROXEN 500 MG/1
500 TABLET ORAL 2 TIMES DAILY WITH MEALS
Qty: 14 TABLET | Refills: 0 | Status: SHIPPED | OUTPATIENT
Start: 2023-11-02 | End: 2023-11-09

## 2023-11-02 NOTE — PROGRESS NOTES
Assessment & Plan     Hand edema  Edema of left hand fingers and forearm x2 weeks unclear cause x-rays and ultrasound were negative.  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 CHERYL    Roseanna Bedolla is a 48 year old, presenting for the following health issues:  Follow Up (Left Hand swelling.)        11/2/2023     8:55 AM   Additional Questions   Roomed by Wally   Accompanied by pcp       History of Present Illness       Reason for visit:  Hand swelling        Left hand and forearm edema x 2 weeks, was seen 10/18/2023 and had nl xrays and us negative for dvt.  She is left handed.  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  She has tried occasional ibuprofen but nothing consistent.      Review of Systems   Constitutional, HEENT, cardiovascular, pulmonary, gi and gu systems are negative, except as otherwise noted.      Objective    /68   Pulse 72   Wt 67.7 kg (149 lb 3 oz)   LMP  (LMP Unknown)   SpO2 97%   BMI 30.13 kg/m    Body mass index is 30.13 kg/m .  Physical Exam   GENERAL: healthy, alert and no distress  NECK: no adenopathy, no asymmetry, masses, or scars and thyroid normal to palpation  RESP: lungs clear to auscultation - no rales, rhonchi or wheezes  CV: regular rate and rhythm, normal S1 S2,  MS: left hand and forearm with diffuse edema, no erythema.  Nttp at elbow, forearm, wrist, hand and fingers

## 2023-11-02 NOTE — PATIENT INSTRUCTIONS
Take naproxen twice a day with food.  Do not take ibuprofen with this    It is ok to take tylenol as needed for pain with the naproxen

## 2023-11-02 NOTE — PROGRESS NOTES
"    [facility] Psychiatry Services - [place]      PATIENT'S NAME: Chioma Mccormick  PREFERRED NAME: Chioma  PRONOUNS:       MRN: [id number]  : 1975  ADDRESS: [address] [address] [facility] [address]  ACCT. NUMBER:  [id number]  DATE OF SERVICE: 23  START TIME: 7:30 am  END TIME: 7:52 am  PREFERRED PHONE: [phone]  May we leave a program related message: Yes  SERVICE MODALITY:  Video Visit:      Provider verified identity through the following two step process.  Patient provided:  Patient  and Patient address    Telemedicine Visit: The patient's condition can be safely assessed and treated via synchronous audio and visual telemedicine encounter.      Reason for Telemedicine Visit: Services only offered telehealth    Originating Site (Patient Location): Patient's home    Distant Site (Provider Location): [facility]    Consent:  The patient/guardian has verbally consented to: the potential risks and benefits of telemedicine (video visit) versus in person care; bill my insurance or make self-payment for services provided; and responsibility for payment of non-covered services.     Patient would like the video invitation sent by:  My Chart    Mode of Communication:  Video Conference [facility]    Distant Location (Provider):  On-site    As the provider I attest to compliance with applicable laws and regulations related to telemedicine.    UNIVERSAL ADULT Mental Health DIAGNOSTIC ASSESSMENT    Identifying Information:  Patient is a 48 year old,    individual.  Patient was referred for an assessment by primary care provider .  Patient attended the session with Evelyn (care giver) .    Chief Complaint:   The reason for seeking services at this time is: \"Anxiety \".  The problem(s) began for many years  .    First appointment with patient in [facility] and was advised of the short-term, team based structure of the model including role of [facility] and " provider. Patient indicated understanding of the model and agreed to proceed with services as described.    Met with pt and care giver Evelyn for initial assessment. Pt reports she has been struggling with anxiety for many years. She has attempted taking different medications. Currently Zoloft has been increased but minimal improvement. Discussed nature of her anxiety. Reports feeling nervous and worried a lot of time. Mainly has high anxiety when she is waiting for [employer] to go to work. Reports she works at [employer] 5 days a week. Once she gets to work anxiety is much improved and able to focus and perform her job. Recently moved to new home with Evelyn and her family in June. It has been going well. 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     Patient has attempted to resolve these concerns in the past through medications .    Social/Family History:  Patient reported they grew up in [place].  They were raised by mom.  Patient reported that their childhood was good.  Patient describes current relationships with family of origin as fair.      The patient describes their cultural background as na.  Cultural influences and impact on patient's life structure, values, norms, and healthcare:  na .  Contextual influences on patient's health include: Contextual Factors: Individual Factors management of her anxiety .  Patient identified their preferred language to be English. Patient reported they do not need the assistance of an  or other support involved in therapy.  Patient reports they are involved in community of karla activities.  They reports spirituality impacts recovery in the following ways:  na.     Patient reported had no significant delays in developmental tasks.   Patient's highest education level was high school graduate. Patient identified the following learning problems: none reported.  Patient reports they are  able to  understand written materials.    Patient's current relationship status is single .    Patient reported having zero child(jake).     Patient's current living/housing situation involves  Evelyn and her family .  They live with Evelyn and her family and they report that housing is stable. Patient identified  Evelyn  as part of their support system.  Patient identified the quality of these relationships as stable and meaningful.      Patient reports engaging in the following recreational/leisure activities: walking dog, binder work and spending time with friend Geraldo. Patient reports engaging in the following recreation/leisure activities while using: walking dog, binder work and spending time with friend Geraldo. Patient reports the following people are supportive of recovery: Evelyn.  Patient is currently employed full time and reports they are able to function appropriately at work..  Patient reports their income is obtained through employment.  Patient does not identify finances as a current stressor.  Cultural and socioeconomic factors do not affect the patient's access to services.      Patient denies substance related arrests or legal issues.  Patient denies being on probation / parole / under the jurisdiction of the court.    Patient's Strengths and Limitations:  Patient identified the following strengths or resources that will help them succeed in treatment: friends / good social support, family support, and work ethic. Things that may interfere with the patient's success in treatment include:  management of MH .     Assessments:  The following assessments were completed by patient for this visit:  PHQ2:       3/21/2023     3:21 PM 3/21/2023     3:17 PM   PHQ-2 ( 1999 [employer])   Q1: Little interest or pleasure in doing things 0 0   Q2: Feeling down, depressed or hopeless 0 0   PHQ-2 Score 0 0   Q1: Little interest or pleasure in doing things Not at all    Q2: Feeling down, depressed or hopeless Not at all     PHQ-2 Score 0      PHQ9:       11/3/2023     7:12 AM   PHQ-9 SCORE   PHQ-9 Total Score MyChart 2 (Minimal depression)   PHQ-9 Total Score 2    2     GAD2:       11/3/2023     7:14 AM   FOUZIA-2   Feeling nervous, anxious, or on edge 3    3   Not being able to stop or control worrying 3    3   FOUZIA-2 Total Score 6    6     GAD7:       11/3/2023     7:14 AM   FOUZIA-7 SCORE   Total Score 14 (moderate anxiety)   Total Score 14    14     CAGE-AID:       11/3/2023     7:15 AM   CAGE-AID Total Score   Total Score 0    0   Total Score MyChart 0 (A total score of 2 or greater is considered clinically significant)     PROMIS 10-Global Health (all questions and answers displayed):       11/3/2023     7:17 AM   PROMIS 10   In general, would you say your health is: Good   In general, would you say your quality of life is: Good   In general, how would you rate your physical health? Good   In general, how would you rate your mental health, including your mood and your ability to think? Good   In general, how would you rate your satisfaction with your social activities and relationships? Good   In general, please rate how well you carry out your usual social activities and roles Good   To what extent are you able to carry out your everyday physical activities such as walking, climbing stairs, carrying groceries, or moving a chair? Completely   In the past 7 days, how often have you been bothered by emotional problems such as feeling anxious, depressed, or irritable? Always   In the past 7 days, how would you rate your fatigue on average? Moderate   In the past 7 days, how would you rate your pain on average, where 0 means no pain, and 10 means worst imaginable pain? 0   In general, would you say your health is: 3    3   In general, would you say your quality of life is: 3    3   In general, how would you rate your physical health? 3    3   In general, how would you rate your mental health, including your mood and your ability to think?  3    3   In general, how would you rate your satisfaction with your social activities and relationships? 3    3   In general, please rate how well you carry out your usual social activities and roles. (This includes activities at home, at work and in your community, and responsibilities as a parent, child, spouse, employee, friend, etc.) 3    3   To what extent are you able to carry out your everyday physical activities such as walking, climbing stairs, carrying groceries, or moving a chair? 5    5   In the past 7 days, how often have you been bothered by emotional problems such as feeling anxious, depressed, or irritable? 5    5   In the past 7 days, how would you rate your fatigue on average? 3    3   In the past 7 days, how would you rate your pain on average, where 0 means no pain, and 10 means worst imaginable pain? 0    0   Global Mental Health Score 10    10   Global Physical Health Score 16    16   PROMIS TOTAL - SUBSCORES 26    26     PROMIS 10-Global Health (only subscores and total score):       11/3/2023     7:17 AM   PROMIS-10 Scores Only   Global Mental Health Score 10    10   Global Physical Health Score 16    16   PROMIS TOTAL - SUBSCORES 26    26     [place] Suicide Severity Rating Scale (Lifetime/Recent)      11/3/2023     8:01 AM   [place] Suicide Severity Rating (Lifetime/Recent)   Q1 Wish to be Dead (Lifetime) N   Q2 Non-Specific Active Suicidal Thoughts (Lifetime) N   Actual Attempt (Lifetime) N   Has subject engaged in non-suicidal self-injurious behavior? (Lifetime) N   Interrupted Attempts (Lifetime) N   Aborted or Self-Interrupted Attempt (Lifetime) N   Preparatory Acts or Behavior (Lifetime) N   Calculated C-SSRS Risk Score (Lifetime/Recent) No Risk Indicated       Personal and Family Medical History:  Patient   report a family history of mental health concerns.  Patient reports family history includes Cancer in her mother..     Patient does not report Mental Health Diagnosis or  Treatment.      Patient has had a physical exam to rule out medical causes for current symptoms.  Date of last physical exam was within the past year. Symptoms have developed since last physical exam and client was encouraged to follow up with PCP.  . The patient has a [place] Primary Care Provider, who is named Aiklah Navarro.  Patient reports no current medical concerns.  Patient denies any issues with pain..   There are significant appetite / nutritional concerns / weight changes.   Patient does not report a history of head injury / trauma / cognitive impairment.      Current Outpatient Medications   Medication    acetaminophen (TYLENOL) 500 MG tablet    blood glucose (CONTOUR TEST) test strip    blood glucose (NO BRAND SPECIFIED) lancets standard    Calcium Carb-Cholecalciferol (OYSCO 500 + D) 500-5 MG-MCG TABS    ibuprofen (ADVIL/MOTRIN) 100 MG tablet    metFORMIN (GLUCOPHAGE) 1000 MG tablet    naproxen (NAPROSYN) 500 MG tablet    sertraline (ZOLOFT) 100 MG tablet    simvastatin (ZOCOR) 10 MG tablet     No current facility-administered medications for this visit.         Medication Adherence:  Patient reports  .  taking prescribed medications as prescribed.    Patient Allergies:  No Known Allergies    Medical History:  No past medical history on file.      Current Mental Status Exam:   Appearance:  Appropriate    Eye Contact:  Good   Psychomotor:  Normal       Gait / station:  no problem  Attitude / Demeanor: Cooperative   Speech      Rate / Production: Normal/ Responsive Slow       Volume:  Normal  volume      Language:  intact  Mood:   Anxious   Affect:   Appropriate    Thought Content: Clear   Thought Process: Coherent  Logical       Associations: No loosening of associations  Insight:   Fair   Judgment:  Intact   Orientation:  All  Attention/concentration: Fair    Substance Use:  Patient did not report a family history of substance use concerns; see medical history section for details.  Patient has not  received chemical dependency treatment in the past.  Patient has not ever been to detox.      Patient is not currently receiving any chemical dependency treatment. Patient reported the following problems as a result of their substance use:   na .    Patient denies using alcohol.  Patient denies using tobacco.  Patient denies using cannabis.  Patient denies using caffeine.  Patient reports using/abusing the following substance(s). Patient reported no other substance use.     Substance Use: No symptoms    Based on the negative CAGE score and clinical interview there  are not indications of drug or alcohol abuse.    Significant Losses / Trauma / Abuse / Neglect Issues:   Patient   did not serve in the .  There are indications or report of significant loss, trauma, abuse or neglect issues related to: are no indications and client denies any losses, trauma, abuse, or neglect concerns.  Concerns for possible neglect are not present.     Safety Assessment:   Patient denies current homicidal ideation and behaviors.  Patient denies current self-injurious ideation and behaviors.    Patient denied risk behaviors associated with substance use.  Patient denies any high risk behaviors associated with mental health symptoms.  Patient reports the following current concerns for their personal safety: None.  Patient reports there   firearms in the house.       There are no firearms in the home..    History of Safety Concerns:  Patient denied a history of homicidal ideation.     Patient denied a history of personal safety concerns.    Patient denied a history of assaultive behaviors.    Patient denied a history of sexual assault behaviors.     Patient denied a history of risk behaviors associated with substance use.  Patient denies any history of high risk behaviors associated with mental health symptoms.  Patient reports the following protective factors:      Risk Plan:  See Recommendations for Safety and Risk Management  Plan    Review of Symptoms per patient report:   Depression: No symptoms  Selene:  No Symptoms  Psychosis: No Symptoms  Anxiety: Excessive worry, Nervousness, Ruminations, and Irritability  Panic:  No symptoms  Post Traumatic Stress Disorder:  No Symptoms   Eating Disorder: No Symptoms  ADD / ADHD:  No symptoms  Conduct Disorder: No symptoms  Autism Spectrum Disorder: No symptoms  Obsessive Compulsive Disorder: No Symptoms    Patient reports the following compulsive behaviors and treatment history:  na .      Diagnostic Criteria:   Generalized Anxiety Disorder  A. Excessive anxiety and worry about a number of events or activities (such as work or school performance).   B. The person finds it difficult to control the worry.  C. Select 3 or more symptoms (required for diagnosis). Only one item is required in children.   - Restlessness or feeling keyed up or on edge.    - Being easily fatigued.    - Difficulty concentrating or mind going blank.    - Irritability.   D. The focus of the anxiety and worry is not confined to features of an Axis I disorder.  E. The anxiety, worry, or physical symptoms cause clinically significant distress or impairment in social, occupational, or other important areas of functioning.   F. The disturbance is not due to the direct physiological effects of a substance (e.g., a drug of abuse, a medication) or a general medical condition (e.g., hyperthyroidism) and does not occur exclusively during a Mood Disorder, a Psychotic Disorder, or a Pervasive Developmental Disorder.    Functional Status:  Patient reports the following functional impairments:  social interactions, use of public transportation, and work / vocational responsibilities.     Nonprogrammatic care:  Patient is requesting basic services to address current mental health concerns.    Clinical Summary:  1. Reason for assessment: high anxiety  .  2. Psychosocial, Cultural and Contextual Factors: management of MH  .  3. Principal DSM5  Diagnoses  (Sustained by DSM5 Criteria Listed Above):   300.02 (F41.1) Generalized Anxiety Disorder.  4. Other Diagnoses that is relevant to services:   .  5. Provisional Diagnosis:   .  6. Prognosis: Expect Improvement.  7. Likely consequences of symptoms if not treated: continued or worsening of sx.  8. Client strengths include:  motivated, open to learning, and support of family, friends and providers .     Recommendations:     1. Plan for Safety and Risk Management:   Safety and Risk: Recommended that patient call 911 or go to the local ED should there be a change in any of these risk factors..          Report to child / adult protection services was NA.     2. Patient's identified mental health concerns with a cultural influence will be addressed by medication management .     3. Initial Treatment will focus on:    Anxiety - see above .     4. Resources/Service Plan:    services are not indicated.   Modifications to assist communication are not indicated.   Additional disability accommodations are not indicated.      5. Collaboration:   Collaboration / coordination of treatment will be initiated with the following  support professionals: psychiatry.      6.  Referrals:   The following referral(s) will be initiated:  will continue to assess . Next Scheduled Appointment: na.      A Release of Information has been obtained for the following:  na .     Clinical Substantiation/medical necessity for the above recommendations:  na.    7. STEPHANI:    STEPHANI:  Discussed the general effects of drugs and alcohol on health and well-being. Provider gave patient printed information about the effects of chemical use on their health and well being. Recommendations:  abstain .     8. Records:   These were not available for review at time of assessment.   Information in this assessment was obtained from the medical record and  provided by patient, family, and care givers  who is a fair historian. Patient will have open  access to their mental health medical record.    9.   Interactive Complexity: No    10. Safety Plan:  Patient denied any current/recent/lifetime history of suicidal ideation and/or behaviors.  No safety plan indicated at this time.     Provider Name/ Credentials:  Dora Mirza   November 3, 2023        Answers submitted by the patient for this visit:  Patient Health Questionnaire (Submitted on 11/3/2023)  If you checked off any problems, how difficult have these problems made it for you to do your work, take care of things at home, or get along with other people?: Not difficult at all  PHQ9 TOTAL SCORE: 2  FOUZIA-7 (Submitted on 11/3/2023)  FOUZIA 7 TOTAL SCORE: 14

## 2023-11-03 ENCOUNTER — VIRTUAL VISIT (OUTPATIENT)
Dept: PSYCHIATRY | Facility: CLINIC | Age: 48
End: 2023-11-03
Attending: PHYSICIAN ASSISTANT
Payer: MEDICARE

## 2023-11-03 ENCOUNTER — VIRTUAL VISIT (OUTPATIENT)
Dept: BEHAVIORAL HEALTH | Facility: CLINIC | Age: 48
End: 2023-11-03
Payer: MEDICARE

## 2023-11-03 DIAGNOSIS — F41.1 GENERALIZED ANXIETY DISORDER: Primary | ICD-10-CM

## 2023-11-03 DIAGNOSIS — F41.1 ANXIETY STATE: Primary | ICD-10-CM

## 2023-11-03 PROCEDURE — 90791 PSYCH DIAGNOSTIC EVALUATION: CPT | Mod: 95 | Performed by: SOCIAL WORKER

## 2023-11-03 PROCEDURE — 99203 OFFICE O/P NEW LOW 30 MIN: CPT | Mod: VID | Performed by: PSYCHIATRY & NEUROLOGY

## 2023-11-03 RX ORDER — SERTRALINE HYDROCHLORIDE 100 MG/1
150 TABLET, FILM COATED ORAL DAILY
COMMUNITY
Start: 2023-11-03 | End: 2023-12-01

## 2023-11-03 RX ORDER — PROPRANOLOL HYDROCHLORIDE 20 MG/1
20 TABLET ORAL 2 TIMES DAILY PRN
Qty: 60 TABLET | Refills: 3 | Status: SHIPPED | OUTPATIENT
Start: 2023-11-03 | End: 2023-12-01

## 2023-11-03 ASSESSMENT — ANXIETY QUESTIONNAIRES
1. FEELING NERVOUS, ANXIOUS, OR ON EDGE: NEARLY EVERY DAY
5. BEING SO RESTLESS THAT IT IS HARD TO SIT STILL: NEARLY EVERY DAY
IF YOU CHECKED OFF ANY PROBLEMS ON THIS QUESTIONNAIRE, HOW DIFFICULT HAVE THESE PROBLEMS MADE IT FOR YOU TO DO YOUR WORK, TAKE CARE OF THINGS AT HOME, OR GET ALONG WITH OTHER PEOPLE: VERY DIFFICULT
GAD7 TOTAL SCORE: 14
GAD7 TOTAL SCORE: 14
3. WORRYING TOO MUCH ABOUT DIFFERENT THINGS: NEARLY EVERY DAY
2. NOT BEING ABLE TO STOP OR CONTROL WORRYING: NEARLY EVERY DAY
IF YOU CHECKED OFF ANY PROBLEMS ON THIS QUESTIONNAIRE, HOW DIFFICULT HAVE THESE PROBLEMS MADE IT FOR YOU TO DO YOUR WORK, TAKE CARE OF THINGS AT HOME, OR GET ALONG WITH OTHER PEOPLE: VERY DIFFICULT
4. TROUBLE RELAXING: MORE THAN HALF THE DAYS
2. NOT BEING ABLE TO STOP OR CONTROL WORRYING: NEARLY EVERY DAY
4. TROUBLE RELAXING: MORE THAN HALF THE DAYS
6. BECOMING EASILY ANNOYED OR IRRITABLE: NOT AT ALL
1. FEELING NERVOUS, ANXIOUS, OR ON EDGE: NEARLY EVERY DAY
5. BEING SO RESTLESS THAT IT IS HARD TO SIT STILL: NEARLY EVERY DAY
GAD7 TOTAL SCORE: 14
GAD7 TOTAL SCORE: 14
7. FEELING AFRAID AS IF SOMETHING AWFUL MIGHT HAPPEN: NOT AT ALL
3. WORRYING TOO MUCH ABOUT DIFFERENT THINGS: NEARLY EVERY DAY
6. BECOMING EASILY ANNOYED OR IRRITABLE: NOT AT ALL
7. FEELING AFRAID AS IF SOMETHING AWFUL MIGHT HAPPEN: NOT AT ALL

## 2023-11-03 ASSESSMENT — COLUMBIA-SUICIDE SEVERITY RATING SCALE - C-SSRS
ATTEMPT LIFETIME: NO
TOTAL  NUMBER OF INTERRUPTED ATTEMPTS LIFETIME: NO
1. HAVE YOU WISHED YOU WERE DEAD OR WISHED YOU COULD GO TO SLEEP AND NOT WAKE UP?: NO
TOTAL  NUMBER OF ABORTED OR SELF INTERRUPTED ATTEMPTS LIFETIME: NO
2. HAVE YOU ACTUALLY HAD ANY THOUGHTS OF KILLING YOURSELF?: NO
6. HAVE YOU EVER DONE ANYTHING, STARTED TO DO ANYTHING, OR PREPARED TO DO ANYTHING TO END YOUR LIFE?: NO

## 2023-11-03 ASSESSMENT — PATIENT HEALTH QUESTIONNAIRE - PHQ9
10. IF YOU CHECKED OFF ANY PROBLEMS, HOW DIFFICULT HAVE THESE PROBLEMS MADE IT FOR YOU TO DO YOUR WORK, TAKE CARE OF THINGS AT HOME, OR GET ALONG WITH OTHER PEOPLE: NOT DIFFICULT AT ALL
10. IF YOU CHECKED OFF ANY PROBLEMS, HOW DIFFICULT HAVE THESE PROBLEMS MADE IT FOR YOU TO DO YOUR WORK, TAKE CARE OF THINGS AT HOME, OR GET ALONG WITH OTHER PEOPLE: NOT DIFFICULT AT ALL
SUM OF ALL RESPONSES TO PHQ QUESTIONS 1-9: 2

## 2023-11-03 ASSESSMENT — PAIN SCALES - GENERAL: PAINLEVEL: NO PAIN (0)

## 2023-11-03 NOTE — PATIENT INSTRUCTIONS
"Patient Education   Collaborative Care Psychiatry Service  What to Expect  Here's what to expect from your Collaborative Care Psychiatry Service (CCPS).   About CCPS  CCPS means 2 people work together to help you get better. You'll meet with a behavioral health clinician and a psychiatric doctor. A behavioral health clinician helps people with mental health problems by talking with them. A psychiatric doctor helps people by giving them medicine.  How it works  At every visit, you'll see the behavioral health clinician (BHC) first. They'll talk with you about how you're doing and teach you how to feel better.   Then you'll see the psychiatric doctor. This doctor can help you deal with troubling thoughts and feelings by giving you medicine. They'll make sure you know the plan for your care.   CCPS usually takes 3 to 6 visits. If you need more visits, we may have you start seeing a different psychiatric doctor for ongoing care.  If you have any questions or concerns, we'll be glad to talk with you.  About visits  Be open  At your visits, please talk openly about your problems. It may feel hard, but it's the best way for us to help you.  Cancelling visits  If you can't come to your visit, please call us right away at [phone]. If you don't cancel at least 24 hours (1 full day) before your visit, that's \"late cancellation.\"  Being late to visits  Being very late is the same as not showing up. You will be a \"no show\" if:  Your appointment starts with a BHC, and you're more than 15 minutes late for a 30-minute (half hour) visit. This will also cancel your appointment with the psychiatric doctor.  Your appointment is with a psychiatric doctor only, and you're more than 15 minutes late for a 30-minute (half hour) visit.  Your appointment is with a psychiatric doctor only, and you're more than 30 minutes late for a 60-minute (full hour) visit.  If you cancel late or don't show up 2 times within 6 months, we may end your " care.   Getting help between visits  If you need help between visits, you can call us Monday to Friday from 8 a.m. to 4:30 p.m. at [phone].  Emergency care  Call 911 or go to the nearest emergency department if your life or someone else's life is in danger.  Call 988 anytime to reach the national Suicide and Crisis hotline.  Medicine refills  To refill your medicine, call your pharmacy. You can also call [facility]'s Behavioral Access at [phone], Monday to Friday, 8 a.m. to 4:30 p.m. It can take 1 to 3 business days to get a refill.   Forms, letters, and tests  You may have papers to fill out, like FMLA, short-term disability, and workability. We can help you with these forms at your visits, but you must have an appointment. You may need more than 1 visit for this, to be in an intensive therapy program, or both.  Before we can give you medicine for ADHD, we may refer you to get tested for it or confirm it another way.  We may not be able to give you an emotional support animal letter.  We don't do mental health checks ordered by the court.   We don't do mental health testing, but we can refer you to get tested.   Thank you for choosing us for your care.  For informational purposes only. Not to replace the advice of your health care provider. Copyright   2022 [facility]. All rights reserved. [employer] [id number] - 12/22.

## 2023-11-03 NOTE — Clinical Note
Akilah,  Thank you for your consult and care of the patient.  Titrating sertraline, trying propranolol.  I wrote a letter as you had recommending on demand service such as LYFT for transportation -as I think psychosocial intervention is more relevant as an intervention than medication but we will still try the medication route.  Sincerely, Jay Jay Wolfe M.D. Consultative Psychiatrist Program Medical Director, [facility] Collaborative Care Psychiatry Service
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
## 2023-11-03 NOTE — NURSING NOTE
Is the patient currently in the state of [place]? YES    Visit mode:VIDEO    If the visit is dropped, the patient can be reconnected by: VIDEO VISIT: Text to cell phone:   Telephone Information:   Mobile [phone]     Caregiver Evelyn helped pt with check in and tech.    Will anyone else be joining the visit? NO  (If patient encounters technical issues they should call [phone] :[phone])    How would you like to obtain your AVS? MyChart    Are changes needed to the allergy or medication list? No  Patient does take a Multi vitamin in morning.    Reason for visit: Consult    Marleny Galeana [facility]    Care team has reviewed attendance agreement with patient. Patient advised that two failed appointments within 6 months may lead to termination of current episode of care.

## 2023-11-03 NOTE — LETTER
To Whom it May Concern,    This letter is written in regards to Chioma Mccormick's condition. She is diagnosed with unspecified anxiety disorder. She has significant anxiety around waiting, loose schedules, and worrying about leaving for work. Use of [employer] is the largest anxiety trigger - with the variable schedule and lack of visible information triggering a lot of anxiety. Requesting consideration for Chioma to utilize [employer]/on demand service. Medications have not been effective as of this point and not directly geared for this type of anxiety and timing of it. Did lead to an incident of bruising. Please consider granting request as at this time it is felt to be medically necessary.     Sincerely,  Jay Jay Wolfe M.D.  Consultative Psychiatrist  [facility]

## 2023-11-28 ENCOUNTER — DOCUMENTATION ONLY (OUTPATIENT)
Dept: FAMILY MEDICINE | Facility: CLINIC | Age: 48
End: 2023-11-28
Payer: MEDICARE

## 2023-11-28 DIAGNOSIS — E11.9 TYPE 2 DIABETES MELLITUS WITHOUT COMPLICATION, WITHOUT LONG-TERM CURRENT USE OF INSULIN (H): Primary | ICD-10-CM

## 2023-11-28 NOTE — PROGRESS NOTES
Patient is on the lab schedule, I do see a lipid in there but she would also like an A1C, please place orders if needed.

## 2023-11-30 NOTE — PROGRESS NOTES
[facility]  2023      Behavioral Health Clinician Progress Note    Patient Name: Chioma Mccormick           Service Type:  Individual      [place]   [facility] [place] (patient reached)     Session Start Time: 918am  Session End Time: 937am      Session Length: 16 - 37      Attendees: Patient and caregiver     Service Modality:  Video Visit:      Provider verified identity through the following two step process.  Patient provided:  Patient  and Patient was verified at admission/transfer    Telemedicine Visit: The patient's condition can be safely assessed and treated via synchronous audio and visual telemedicine encounter.      Reason for Telemedicine Visit: Services only offered telehealth    Originating Site (Patient Location): Patient's home    Distant Site (Provider Location): Provider Remote Setting- Home Office    Consent:  The patient/guardian has verbally consented to: the potential risks and benefits of telemedicine (video visit) versus in person care; bill my insurance or make self-payment for services provided; and responsibility for payment of non-covered services.     Patient would like the video invitation sent by:  My Chart    Mode of Communication:  Video Conference via [facility]    Distant Location (Provider):  Off-site    As the provider I attest to compliance with applicable laws and regulations related to telemedicine.    Visit Activities (Refresh list every visit): [facility] [place] Only    Diagnostic Assessment Date: 11/3/2023 Dora Mirza  Treatment Plan Review Date: in the next few visits  See Flowsheets for today's PHQ-9 and FOUZIA-7 results  Previous PHQ-9:       11/3/2023     7:12 AM   PHQ-9 SCORE   PHQ-9 Total Score MyChart 2 (Minimal depression)   PHQ-9 Total Score 2    2     Previous FOUZIA-7:       11/3/2023     7:14 AM   FOUZIA-7 SCORE   Total Score 14 (moderate anxiety)   Total Score 14    14           DATA  Extended Session (60+  minutes): No  Interactive Complexity: No  Crisis: No  [facility] Patient: No    Treatment Objective(s) Addressed in This Session:  Target Behavior(s):  reduce worry that her ride will be late to take her to work    Anxiety: will experience a reduction in anxiety, will develop more effective coping skills to manage anxiety symptoms, and will increase ability to function adaptively    Current Stressors / Issues:  Medication Questions/Requests:medication hasn't been the most helpful     MH update: Pt has been taking anxiety medication and they are getting text messages about the bus. The medication is helping a little bit. Her caregiver says that when it is time to do something, pt will look out the window when she is nervous about it not coming. She has been taking the medication sometimes and it doesn't seem to really work. Pt will talk about something that is happening 2 weeks away and she wanted to call and get this scheduled. The unknown is hard. The Zoloft has been increased a couple times. Her care giver says that things are about the same. She will breath, use fidget spinner, pop it and will take the puppy for a walk and will feed her. She has a ball her therapist gave her to use to breath it and out with. This is helpful for them. Pt doesn't like getting to work late and pt will let them know that she is going to be late. The bus isn't reliable at times it will come early and late. They have scheduled the bus to pick her up way earlier today they will pick her up at 1219 for 2pm shift. Pt will stand by the door early and will look out the window waiting.   Stressors: the bus to work and like to get to work early  Side Effects: no  Mood: worry  Appetite: unremarkable  Sleep: unremarkable, here and there and preston to fall asleep right after using the restroom     Caffeine: ice water at work, pop at work  Therapist: therapist and she is very nice      Progress on Treatment Objective(s) / Homework:  New Objective  established this session - ACTION (Actively working towards change); Intervened by reinforcing change plan / affirming steps taken    Motivational Interviewing    MI Intervention: Co-Developed Goal: reduce worry about her ride being late to work, Expressed Empathy/Understanding, Supported Autonomy, Collaboration, Evocation, Permission to raise concern or advise, Open-ended questions, and Reflections: simple and complex     Change Talk Expressed by the Patient: Committment to change Activation Taking steps    Provider Response to Change Talk: E - Evoked more info from patient about behavior change and A - Affirmed patient's thoughts, decisions, or attempts at behavior change    Also provided psychoeducation about behavioral health condition, symptoms, and treatment options    Assessments completed prior to visit:  The following assessments were completed by patient for this visit:  PHQ2:       12/1/2023     8:51 AM 3/21/2023     3:21 PM 3/21/2023     3:17 PM   PHQ-2 ( 1999 [employer])   Q1: Little interest or pleasure in doing things 0    0 0 0   Q2: Feeling down, depressed or hopeless 0    0 0 0   PHQ-2 Score 0    0 0 0   Q1: Little interest or pleasure in doing things Not at all Not at all    Q2: Feeling down, depressed or hopeless Not at all Not at all    PHQ-2 Score 0 0        Care Plan review completed: No    Medication Review:  No changes to current psychiatric medication(s)    Medication Compliance:  Yes    Changes in Health Issues:   None reported    Chemical Use Review:   Substance Use: Chemical use reviewed, no active concerns identified      Tobacco Use: No current tobacco use.      Assessment: Current Emotional / Mental Status (status of significant symptoms):  Risk status (Self / Other harm or suicidal ideation)  Patient denies a history of suicidal ideation, suicide attempts, self-injurious behavior, homicidal ideation, homicidal behavior, and and other safety concerns  Patient denies current fears or  concerns for personal safety.  Patient denies current or recent suicidal ideation or behaviors.  Patient denies current or recent homicidal ideation or behaviors.  Patient denies current or recent self injurious behavior or ideation.  Patient denies other safety concerns.  A safety and risk management plan has not been developed at this time, however patient was encouraged to call [place] [phone] should there be a change in any of these risk factors.    Appearance:   Appropriate   Eye Contact:   Good   Psychomotor Behavior: Normal , figeting  Attitude:   Cooperative  Friendly  Orientation:   All  Speech   Rate / Production: Normal    Volume:  Normal   Mood:    Anxious   Affect:    Appropriate   Thought Content:  Clear   Thought Form:  Coherent  Logical   Insight:    Fair     Diagnoses:  No diagnosis found.    Collateral Reports Completed:  Communicated with: Jay Jay Wolfe M.D.     Plan: (Homework, other):  Patient was given information about behavioral services and encouraged to schedule a follow up appointment with the clinic [facility] in 1 month.  She was also given information about mental health symptoms and treatment options .  CD Recommendations: No indications of CD issues.       Nupur Oneill, [facility]    ______________________________________________________________________    Integrated Primary Care Behavioral Health Treatment Plan    Patient's Name: Chioma Mccormick  YOB: 1975    Date of Creation: in the next few visits  Date Treatment Plan Last Reviewed/Revised: in the next few visits    DSM5 Diagnoses: 300.02 (F41.1) Generalized Anxiety Disorder  Psychosocial / Contextual Factors: works, has caregiver, worry  PROMIS (reviewed every 90 days):   PROMIS 10-Global Health (only subscores and total score):       11/3/2023     7:17 AM   PROMIS-10 Scores Only   Global Mental Health Score 10    10   Global Physical Health Score 16    16   PROMIS TOTAL - SUBSCORES 26    26       Referral /  Collaboration:  Referral to another professional/service is not indicated at this time.    Anticipated number of session for this episode of care: 5-6  Anticipation frequency of session: Monthly  Anticipated Duration of each session: 16-37 minutes  Treatment plan will be reviewed in 90 days or when goals have been changed.           Patient has reviewed and agreed to the above plan.      TA Barbour  December 1, 2023

## 2023-12-01 ENCOUNTER — VIRTUAL VISIT (OUTPATIENT)
Dept: PSYCHIATRY | Facility: CLINIC | Age: 48
End: 2023-12-01
Payer: MEDICARE

## 2023-12-01 ENCOUNTER — VIRTUAL VISIT (OUTPATIENT)
Dept: BEHAVIORAL HEALTH | Facility: CLINIC | Age: 48
End: 2023-12-01
Payer: MEDICARE

## 2023-12-01 DIAGNOSIS — F41.1 GENERALIZED ANXIETY DISORDER: Primary | ICD-10-CM

## 2023-12-01 DIAGNOSIS — F41.1 ANXIETY STATE: Primary | ICD-10-CM

## 2023-12-01 PROCEDURE — 99213 OFFICE O/P EST LOW 20 MIN: CPT | Mod: VID | Performed by: PSYCHIATRY & NEUROLOGY

## 2023-12-01 PROCEDURE — 90832 PSYTX W PT 30 MINUTES: CPT | Mod: 95 | Performed by: COUNSELOR

## 2023-12-01 RX ORDER — SERTRALINE HYDROCHLORIDE 100 MG/1
200 TABLET, FILM COATED ORAL DAILY
Qty: 180 TABLET | Refills: 0 | Status: SHIPPED | OUTPATIENT
Start: 2023-12-01 | End: 2024-01-26

## 2023-12-01 NOTE — PROGRESS NOTES
Virtual Visit Details    Type of service:  Video Visit   Video Start Time: 9:42 AM  Video End Time:9:52 AM    Originating Location (pt. Location): Home    Distant Location (provider location):  On-site  Platform used for Video Visit: [facility] Psychiatry Consult Note    IDENTIFICATION   Name: Chioma Mccormick   : 1975/48 year old      Sex:    @ female          Telemedicine Visit: The patient's condition can be safely assessed and treated via synchronous audio and visual telemedicine encounter.        Face to Face/patient Contact total time: 10 minutes  Pre Charting time: 4 minutes; Post charting time, communication and other activities: 0 minutes; Total time 14 minutes            SUBJECTIVE   Anxiety is slightly better with sertraline titration. Still nervous when rides are coming or going somewhere, or planning ahead. Perseverates. Fortunately not getting worse. Caregiver offers coping skills which she usually takes. Slight improvement - previously would not use coping skills - and now with sertraline titration more often than not using coping skills and taking guidance. Propranolol - no effect.       The following assessments were completed by patient for this visit:  PROMIS 10-Global Health (only subscores and total score):       11/3/2023     7:17 AM   PROMIS-10 Scores Only   Global Mental Health Score 10    10   Global Physical Health Score 16    16   PROMIS TOTAL - SUBSCORES 26    26             11/3/2023     7:12 AM   PHQ   PHQ-9 Total Score 2    2   Q9: Thoughts of better off dead/self-harm past 2 weeks Not at all    Not at all          11/3/2023     7:14 AM   FOUZIA-7 SCORE   Total Score 14 (moderate anxiety)   Total Score 14    14        OBJECTIVE     Vital Signs:   LMP  (LMP Unknown)     Labs:  na    Current Medications:  Current Outpatient Medications   Medication    acetaminophen (TYLENOL) 500 MG tablet    blood glucose (CONTOUR TEST) test strip    blood glucose (NO BRAND SPECIFIED)  lancets standard    Calcium Carb-Cholecalciferol (OYSCO 500 + D) 500-5 MG-MCG TABS    ibuprofen (ADVIL/MOTRIN) 100 MG tablet    metFORMIN (GLUCOPHAGE) 1000 MG tablet    propranolol (INDERAL) 20 MG tablet    sertraline (ZOLOFT) 100 MG tablet    simvastatin (ZOCOR) 10 MG tablet     No current facility-administered medications for this visit.        The [place] Prescription Monitoring Program has been reviewed and there are no concerns about diversionary activity for controlled substances at this time.        ADDED HISTORY   Lyft approved for community and not work.       MENTAL STATUS EXAMINATION:   Appearance: Intact attention to grooming and hygiene, pink hair  Attitude: Cooperative  Eye Contact: Fair  Gait and Station: Sitting  Psychomotor Behavior: Within normal limits  Oriented to: Grossly person place and time  Attention Span and Concentration: Grossly intact  Speech: Minimally anxious tone  Language: English  Mood: Fair  Affect: Mildly constricted  Associations:  no loose associations  Thought Process:  logical, linear and goal oriented  Thought Content: No evidence of delusions or suicidal or homicidal ideation plan or intent  Memory: Grossly intact  Fund of Knowledge: Grossly intact  Insight:  fair  Judgment:  intact, adequate for safety  Impulse Control:  intact        DIAGNOSES:   Unspecified anxiety disorder  Down Syndrome  Rule out autism spectrum disorder      ASSESSMENT:   Patient dealing with anxiety, degree of rigidity and if things are not on time for her schedule anxiety ramps up.  This is the biggest trigger-specifically transportation to and from work with [employer] and variation with timing and sometimes not having a ride pick her up.  Sertraline titration has been minimally efficacious, titrate further.  Failed trial of propranolol.  It is more appropriate to have a psychosocial intervention rather than medication and dependence letter written for medical necessity for on demand service.  Is doing therapy to help address.    Today Chioma Mccormick reports no suicidal ideations. In addition, she has notable risk factors for self-harm, including anxiety. However, risk is mitigated by commitment to family and Denominational beliefs. Therefore, based on all available evidence including the factors cited above, she does not appear to be at imminent risk for self-harm, does not meet criteria for a 72-hr hold, and therefore remains appropriate for ongoing outpatient level of care.       PLAN:     Patient advised of consultative model. Patient will continue to be seen for ongoing consultation and stabilization.  Does not meet criteria for involuntary treatment or hospitalization  Sertraline 100 mg daily => 11/3/2023 150 mg daily better using coping skills => 12/1/23 200 mg po qday-Risks, benefits and alternatives discussed.  Patient provides verbal consent to treatment.  Advised to further use coping skills with med titration - fu  Dc'd propranolol (ineffective)  Letter written for recommending on demand/Lyft service to address anxiety  Undergoing psychotherapy  Return in 4 to 8 weeks    Administrative Billing:   Time spent with patient was greater than 50% of time and/or significant time was spent in counseling and coordination of care regarding above diagnoses and treatment plan. Pre charting time and post charting time/documentation/coordination are done on date of service.      Signed:   Jay Jay Wolfe M.D.  [facility] Psychiatry Service    Disclaimer: This note consists of symbols derived from keyboarding, dictation and/or voice recognition software. As a result, there may be errors in the script that have gone undetected. Please consider this when interpreting information found in this chart.

## 2023-12-01 NOTE — NURSING NOTE
Is the patient currently in the state of [place]? YES    Visit mode:VIDEO    If the visit is dropped, the patient can be reconnected by: VIDEO VISIT: Text to cell phone:   Telephone Information:   Mobile [phone]       Will anyone else be joining the visit? NO  (If patient encounters technical issues they should call [phone]    How would you like to obtain your AVS? MyChart    Are changes needed to the allergy or medication list? Pt stated no changes to allergies and Pt stated no med changes    Reason for visit: KAJAL BOND

## 2023-12-20 ENCOUNTER — LAB (OUTPATIENT)
Dept: LAB | Facility: CLINIC | Age: 48
End: 2023-12-20
Payer: MEDICARE

## 2023-12-20 DIAGNOSIS — E11.9 DM (DIABETES MELLITUS) (H): Primary | ICD-10-CM

## 2023-12-20 DIAGNOSIS — E11.9 TYPE 2 DIABETES MELLITUS WITHOUT COMPLICATION, WITHOUT LONG-TERM CURRENT USE OF INSULIN (H): ICD-10-CM

## 2023-12-20 LAB
CHOLEST SERPL-MCNC: 121 MG/DL
FASTING STATUS PATIENT QL REPORTED: YES
HBA1C MFR BLD: 7.9 % (ref 0–5.6)
HDLC SERPL-MCNC: 31 MG/DL
LDLC SERPL CALC-MCNC: 69 MG/DL
NONHDLC SERPL-MCNC: 90 MG/DL
TRIGL SERPL-MCNC: 103 MG/DL

## 2023-12-20 PROCEDURE — 80061 LIPID PANEL: CPT

## 2023-12-20 PROCEDURE — 83036 HEMOGLOBIN GLYCOSYLATED A1C: CPT

## 2023-12-20 PROCEDURE — 36415 COLL VENOUS BLD VENIPUNCTURE: CPT

## 2023-12-22 ENCOUNTER — VIRTUAL VISIT (OUTPATIENT)
Dept: FAMILY MEDICINE | Facility: CLINIC | Age: 48
End: 2023-12-22
Payer: MEDICARE

## 2023-12-22 ENCOUNTER — LAB (OUTPATIENT)
Dept: FAMILY MEDICINE | Facility: CLINIC | Age: 48
End: 2023-12-22

## 2023-12-22 DIAGNOSIS — F41.1 ANXIETY STATE: ICD-10-CM

## 2023-12-22 DIAGNOSIS — Z12.11 SCREEN FOR COLON CANCER: Primary | ICD-10-CM

## 2023-12-22 DIAGNOSIS — E11.9 TYPE 2 DIABETES MELLITUS WITHOUT COMPLICATION, WITHOUT LONG-TERM CURRENT USE OF INSULIN (H): ICD-10-CM

## 2023-12-22 DIAGNOSIS — Z12.11 SCREEN FOR COLON CANCER: ICD-10-CM

## 2023-12-22 PROCEDURE — 99214 OFFICE O/P EST MOD 30 MIN: CPT | Mod: 95 | Performed by: PHYSICIAN ASSISTANT

## 2023-12-22 NOTE — PROGRESS NOTES
Chioma is a 48 year old who is being evaluated via a billable telephone visit.    [phone]   What phone number would you like to be contacted at? [phone]   How would you like to obtain your AVS? Kenn    Distant Location (provider location):  On-site    Assessment & Plan     Screen for colon cancer    - SRINIVASA(EXACT SCIENCES)    Type 2 diabetes mellitus without complication, without long-term current use of insulin (H)  Stable, continue present treatment, follow up in 3 months  - Hemoglobin A1c    Anxiety  Improving  Continue working with therapy and psychiatry             Akilah Navarro PA-C  [facility]   Chioma is a 48 year old, presenting for the following health issues:  Labs Folllow Up      12/22/2023     8:32 AM   Additional Questions   Roomed by Rhea LEOS       [facility]     Mental health- she is working with therapy and psychiatry, she is taking 200 mg of zoloft and she and don have noticed improvement in her anxiety between that the therapy.  She is getting lift funds from the [facility] to get around in the community but can't use this for work or medical appointments.  She does note that waiting for transportation to get to work is stressful for her she does have fidgets that she uses and is working with therapy to try to decrease that stress.  They are still working with the [facility] to see if they could get lift approved for transportation to work    Diabetes - exercising by walking and goes to the gym to work out.  She is trying to eat healthy.  A1c is stable          Review of Systems   Constitutional, HEENT, cardiovascular, pulmonary, gi and gu systems are negative, except as otherwise noted.      Objective           Vitals:  No vitals were obtained today due to virtual visit.    Physical Exam   healthy, alert, and no distress  PSYCH: Alert and oriented times 3; coherent speech, normal   rate and volume, able to articulate logical thoughts, able    to abstract reason, no tangential thoughts, no hallucinations   or delusions  Her affect is normal  RESP: No cough, no audible wheezing, able to talk in full sentences  Remainder of exam unable to be completed due to telephone visits                Phone call duration: 11 minutes

## 2024-01-10 ENCOUNTER — VIRTUAL VISIT (OUTPATIENT)
Dept: EDUCATION SERVICES | Facility: CLINIC | Age: 49
End: 2024-01-10
Payer: MEDICARE

## 2024-01-10 DIAGNOSIS — E11.9 TYPE 2 DIABETES MELLITUS WITHOUT COMPLICATION, WITHOUT LONG-TERM CURRENT USE OF INSULIN (H): Primary | ICD-10-CM

## 2024-01-10 PROCEDURE — 98968 PH1 ASSMT&MGMT NQHP 21-30: CPT | Mod: 93 | Performed by: DIETITIAN, REGISTERED

## 2024-01-10 NOTE — PATIENT INSTRUCTIONS
Goals for Diabetes Care:    1. Eat 3 balanced meals each day - Monitor carb intake and aim for 45-60 grams per meal  This would be equal to about 4 choices of carbohydrates. Carbohydrate 1 choice = 15 grams  Aim to eat the plate method style - half your plate fruits/veggies, 1/4 the plate protein and 1/4 plate starch (rice, potato, pasta)    2. Check blood sugars at least 1 time each day at varying times   Blood Glucose Targets:   1. Fasting and before meal target is 80 - 130   2. 2 hours after a meal target is < 180  Always remember to bring meter and log book to all appointments.    3. Activity really helps improve blood sugars. Try to Incorporate 30 minutes activity into each day - does not need to be all at one time & walking counts!    4. Treating low BG. Rule of 15 = BG 50-70 mg/dL = 15 gram carb (4 oz juice, 4 glucose tabs, OR 4 oz pop), then recheck BG in 15 minutes. If still low repeat. (If BG <50 mg/dL = 8 oz pop/juice or 8 glucose tabs).    5. Take diabetes medications as prescribed   -Metformin 1,000 mg/day    Follow up with your Diabetic Educator to assess BG targets and need for modifications to medications and/or lifestyle.    Call with any questions.  Thank you!  Lauren Marie RDN, LD, [facility]   Certified Diabetes Care &   [facility]  Triage [phone]

## 2024-01-10 NOTE — LETTER
1/10/2024         RE: Chioma Mccormick  [address] [address]        Dear Colleague,    Thank you for referring your patient, Chioma Mccormick, to the [facility]. Please see a copy of my visit note below.    Diabetes Self-Management Education & Support    Presents for:      Type of Service: Telephone Visit    Originating Location (Patient Location): Home  Distant Location (Provider Location): Offsite  Mode of Communication:  Telephone    Telephone Visit Start Time:  10:59 AM  Telephone Visit End Time (telephone visit stop time): 11:20 AM    How would patient like to obtain AVS? MyChart      ASSESSMENT:  Spoke with Chioma and her caregiver Evelyn today. Chioma is trying to get more active now that it is winter. They joined the gym and are trying to go daily. Also patient is now on a bowling league and plays a few times a week.   Reviewed food choices - patient is choosing vegetables more often and eating at home. Evelyn cooks healthy meals. Discussed potential need to increase metformin if BG do not improve.   Reports checking BG fasting daily and BG have been around 110-120 mg/dL.    Patient's most recent   Lab Results   Component Value Date    A1C 7.9 12/20/2023     is not meeting goal of <7.0    Diabetes knowledge and skills assessment:   Patient is knowledgeable in diabetes management concepts related to: Healthy Eating, Being Active, Monitoring, Taking Medication, Problem Solving, Reducing Risks, and Healthy Coping    Continue education with the following diabetes management concepts: Taking Medication    Based on learning assessment above, most appropriate setting for further diabetes education would be: Individual setting.      PLAN  Consider increasing metformin to 2,000 mg/day at next A1C check    Topics to cover at upcoming visits: Taking Medication    Follow-up: 6 months    See Care Plan for co-developed, patient-state behavior change goals.  AVS provided for  "patient today.    Education Materials Provided:  No new materials provided today      SUBJECTIVE/OBJECTIVE:     Cultural Influences/Ethnic Background:  Not  or     Diabetes Symptoms & Complications:     Patient Problem List and Family Medical History reviewed for relevant medical history, current medical status, and diabetes risk factors.    Vitals:  There were no vitals taken for this visit.  Estimated body mass index is 30.13 kg/m  as calculated from the following:    Height as of 10/18/23: 1.499 m (4' 11\").    Weight as of 11/2/23: 67.7 kg (149 lb 3 oz).   Last 3 BP:   BP Readings from Last 3 Encounters:   11/02/23 122/68   10/18/23 90/56   10/12/23 99/65       History   Smoking Status     Never   Smokeless Tobacco     Never       Labs:  Lab Results   Component Value Date    A1C 7.9 12/20/2023     Lab Results   Component Value Date     06/14/2023     02/23/2022     Lab Results   Component Value Date    LDL 69 12/20/2023     Direct Measure HDL   Date Value Ref Range Status   12/20/2023 31 (L) >=50 mg/dL Final   ]  GFR Estimate   Date Value Ref Range Status   06/14/2023 >90 >60 mL/min/1.73m2 Final     Comment:     eGFR calculated using 2021 CKD-EPI equation.   02/09/2021 >60 >60 mL/min/1.73m2 Final     GFR Estimate If Black   Date Value Ref Range Status   02/09/2021 >60 >60 mL/min/1.73m2 Final     Lab Results   Component Value Date    CR 0.67 06/14/2023     No results found for: \"MICROALBUMIN\"    Healthy Eating:  Meals include: Breakfast, Lunch, Dinner  Breakfast: toast and peanut butter  Lunch: salads OR tuna sandwiches with chips or vegetables OR sometimes cheese and carrots  Dinner: Turkey burgers, taco's, taco salad  Snacks: not often. Not often eating sweets.  Beverages: Water, Diet soda  Has patient met with a dietitian in the past?: No    Being Active:  Being Active Assessed Today: Yes  Exercise:: Yes  Days per week of moderate to strenuous exercise (like a brisk walk): 5 (bowling " a few days a week. Saturdays - does other stuff)  On average, minutes per day of exercise at this level: 60  How intense was your typical exercise? : Light (like stretching or slow walking)  Exercise Minutes per Week: 300    Monitoring:  Checking once daily    Taking Medications:  Diabetes Medication(s)       Biguanides       metFORMIN (GLUCOPHAGE) 1000 MG tablet Take 1 tablet (1,000 mg) by mouth daily (with dinner)            Problem Solving:  Not addressed today     Reducing Risks:  Up to date  Healthy Coping:     Patient Activation Measure Survey Score:       No data to display                Care Plan and Education Provided:  Care Plan: Diabetes   Updates made by Lauren Marie RD since 1/10/2024 12:00 AM        Problem: HbA1C Not In Goal         Goal: Establish Regular Follow-Ups with PCP         Task: Discuss with PCP the recommended timing for patient's next follow up visit(s)    Responsible User: Lauren Marie RD        Task: Discuss schedule for PCP visits with patient    Responsible User: Lauren Marie RD        Goal: Get HbA1C Level in Goal         Task: Educate patient on diabetes education self-management topics    Responsible User: Lauren Marie RD        Task: Educate patient on benefits of regular glucose monitoring    Responsible User: Lauren Marie RD        Task: Refer patient to appropriate extended care team member, as needed (Medication Therapy Management, Behavioral Health, Physical Therapy, etc.)    Responsible User: Lauren Marie RD        Task: Discuss diabetes treatment plan with patient    Responsible User: Lauren Marie RD        Problem: Diabetes Self-Management Education Needed to Optimize Self-Care Behaviors         Goal: Understand diabetes pathophysiology and disease progression         Task: Provide education on diabetes pathophysiology and disease progression specfic to patient's diabetes type Completed 1/10/2024   Responsible User:  Lauren Marie RD        Goal: Healthy Eating - follow a healthy eating pattern for diabetes         Task: Provide education on portion control and consistency in amount, composition and timing of food intake Completed 1/10/2024   Responsible User: Lauren Marie RD        Task: Provide education on managing carbohydrate intake (carbohydrate counting, plate planning method, etc.)    Responsible User: Lauren Marie RD        Task: Provide education on weight management    Responsible User: Lauren Marie RD        Task: Provide education on heart healthy eating    Responsible User: Lauren Marie RD        Task: Provide education on eating out    Responsible User: Lauren Marie RD        Task: Develop individualized healthy eating plan with patient    Responsible User: Lauren Marie RD        Goal: Being Active - get regular physical activity, working up to at least 150 minutes per week    This Visit's Progress: 100%   Note:    Be active for 30 minutes/day x 5 days/week         Task: Provide education on relationship of activity to glucose and precautions to take if at risk for low glucose    Responsible User: Lauren Marie RD        Task: Discuss barriers to physical activity with patient    Responsible User: Lauren Marie RD        Task: Develop physical activity plan with patient    Responsible User: Lauren Marie RD        Task: Explore community resources including walking groups, assistance programs, and home videos    Responsible User: Lauren Marie RD        Goal: Monitoring - monitor glucose and ketones as directed         Task: Provide education on blood glucose monitoring (purpose, proper technique, frequency, glucose targets, interpreting results, when to use glucose control solution, sharps disposal)    Responsible User: Lauren Marie RD        Task: Provide education on continuous glucose monitoring (sensor placement, use of  jolanta or /reader, understanding glucose trends, alerts and alarms, differences between sensor glucose and blood glucose)    Responsible User: Lauren Marie RD        Task: Provide education on ketone monitoring (when to monitor, frequency, etc.)    Responsible User: Lauren Marie RD        Goal: Taking Medication - patient is consistently taking medications as directed         Task: Provide education on action of prescribed medication, including when to take and possible side effects Completed 1/10/2024   Responsible User: Lauren Marie RD        Task: Provide education on insulin and injectable diabetes medications, including administration, storage, site selection and rotation for injection sites    Responsible User: Lauren Marie RD        Task: Discuss barriers to medication adherence with patient and provide management technique ideas as appropriate    Responsible User: Lauren Marie RD        Task: Provide education on frequency and refill details of medications    Responsible User: Lauren Marie RD        Goal: Problem Solving - know how to prevent and manage short-term diabetes complications         Task: Provide education on high blood glucose - causes, signs/symptoms, prevention and treatment    Responsible User: Lauren Marie RD        Task: Provide education on low blood glucose - causes, signs/symptoms, prevention, treatment, carrying a carbohydrate source at all times, and medical identification    Responsible User: Lauren Marie RD        Task: Provide education on safe travel with diabetes    Responsible User: Lauren Marie RD        Task: Provide education on how to care for diabetes on sick days    Responsible User: Lauren Marie RD        Task: Provide education on when to call a health care provider    Responsible User: Lauren Marie RD        Goal: Reducing Risks - know how to prevent and treat long-term diabetes  complications         Task: Provide education on major complications of diabetes, prevention, early diagnostic measures and treatment of complications    Responsible User: Lauren Marie RD        Task: Provide education on recommended care for dental, eye and foot health    Responsible User: Lauren Marie RD        Task: Provide education on Hemoglobin A1c - goals and relationship to blood glucose levels Completed 1/10/2024   Responsible User: Lauren Marie RD        Task: Provide education on recommendations for heart health - lipid levels and goals, blood pressure and goals, and aspirin therapy, if indicated    Responsible User: Lauren Marie RD        Task: Provide education on tobacco cessation    Responsible User: Lauren Marie RD        Goal: Healthy Coping - use available resources to cope with the challenges of managing diabetes         Task: Discuss recognizing feelings about having diabetes Completed 1/10/2024   Responsible User: Lauren Marie RD        Task: Provide education on the benefits of making appropriate lifestyle changes    Responsible User: Lauren Marie RD        Task: Provide education on benefits of utilizing support systems    Responsible User: Lauren Marie RD        Task: Discuss methods for coping with stress    Responsible User: Lauren Marie RD        Task: Provide education on when to seek professional counseling    Responsible User: Lauren Marie RD            Time Spent: 21 minutes  Encounter Type: Individual    Any diabetes medication dose changes were made via the CDE Protocol per the patient's referring provider. A copy of this encounter was shared with the provider.

## 2024-01-10 NOTE — PROGRESS NOTES
Diabetes Self-Management Education & Support    Presents for:      Type of Service: Telephone Visit    Originating Location (Patient Location): Home  Distant Location (Provider Location): Offsite  Mode of Communication:  Telephone    Telephone Visit Start Time:  10:59 AM  Telephone Visit End Time (telephone visit stop time): 11:20 AM    How would patient like to obtain AVS? Kenn      ASSESSMENT:  Spoke with Chioma and her caregiver Evelyn today. Chioma is trying to get more active now that it is winter. They joined the gym and are trying to go daily. Also patient is now on a bowling league and plays a few times a week.   Reviewed food choices - patient is choosing vegetables more often and eating at home. Evelyn cooks healthy meals. Discussed potential need to increase metformin if BG do not improve.   Reports checking BG fasting daily and BG have been around 110-120 mg/dL.    Patient's most recent   Lab Results   Component Value Date    A1C 7.9 12/20/2023     is not meeting goal of <7.0    Diabetes knowledge and skills assessment:   Patient is knowledgeable in diabetes management concepts related to: Healthy Eating, Being Active, Monitoring, Taking Medication, Problem Solving, Reducing Risks, and Healthy Coping    Continue education with the following diabetes management concepts: Taking Medication    Based on learning assessment above, most appropriate setting for further diabetes education would be: Individual setting.      PLAN  Consider increasing metformin to 2,000 mg/day at next A1C check    Topics to cover at upcoming visits: Taking Medication    Follow-up: 6 months    See Care Plan for co-developed, patient-state behavior change goals.  AVS provided for patient today.    Education Materials Provided:  No new materials provided today      SUBJECTIVE/OBJECTIVE:     Cultural Influences/Ethnic Background:  Not  or     Diabetes Symptoms & Complications:     Patient Problem List and Family  "Medical History reviewed for relevant medical history, current medical status, and diabetes risk factors.    Vitals:  There were no vitals taken for this visit.  Estimated body mass index is 30.13 kg/m  as calculated from the following:    Height as of 10/18/23: 1.499 m (4' 11\").    Weight as of 11/2/23: 67.7 kg (149 lb 3 oz).   Last 3 BP:   BP Readings from Last 3 Encounters:   11/02/23 122/68   10/18/23 90/56   10/12/23 99/65       History   Smoking Status    Never   Smokeless Tobacco    Never       Labs:  Lab Results   Component Value Date    A1C 7.9 12/20/2023     Lab Results   Component Value Date     06/14/2023     02/23/2022     Lab Results   Component Value Date    LDL 69 12/20/2023     Direct Measure HDL   Date Value Ref Range Status   12/20/2023 31 (L) >=50 mg/dL Final   ]  GFR Estimate   Date Value Ref Range Status   06/14/2023 >90 >60 mL/min/1.73m2 Final     Comment:     eGFR calculated using 2021 CKD-EPI equation.   02/09/2021 >60 >60 mL/min/1.73m2 Final     GFR Estimate If Black   Date Value Ref Range Status   02/09/2021 >60 >60 mL/min/1.73m2 Final     Lab Results   Component Value Date    CR 0.67 06/14/2023     No results found for: \"MICROALBUMIN\"    Healthy Eating:  Meals include: Breakfast, Lunch, Dinner  Breakfast: toast and peanut butter  Lunch: salads OR tuna sandwiches with chips or vegetables OR sometimes cheese and carrots  Dinner: Turkey burgers, taco's, taco salad  Snacks: not often. Not often eating sweets.  Beverages: Water, Diet soda  Has patient met with a dietitian in the past?: No    Being Active:  Being Active Assessed Today: Yes  Exercise:: Yes  Days per week of moderate to strenuous exercise (like a brisk walk): 5 (bowling a few days a week. Saturdays - does other stuff)  On average, minutes per day of exercise at this level: 60  How intense was your typical exercise? : Light (like stretching or slow walking)  Exercise Minutes per Week: 300    Monitoring:  Checking " once daily    Taking Medications:  Diabetes Medication(s)       Biguanides       metFORMIN (GLUCOPHAGE) 1000 MG tablet Take 1 tablet (1,000 mg) by mouth daily (with dinner)            Problem Solving:  Not addressed today     Reducing Risks:  Up to date  Healthy Coping:     Patient Activation Measure Survey Score:       No data to display                Care Plan and Education Provided:  Care Plan: Diabetes   Updates made by Lauren Marie RD since 1/10/2024 12:00 AM        Problem: HbA1C Not In Goal         Goal: Establish Regular Follow-Ups with PCP         Task: Discuss with PCP the recommended timing for patient's next follow up visit(s)    Responsible User: Lauren Marie RD        Task: Discuss schedule for PCP visits with patient    Responsible User: Lauren Marie RD        Goal: Get HbA1C Level in Goal         Task: Educate patient on diabetes education self-management topics    Responsible User: Lauren Marie RD        Task: Educate patient on benefits of regular glucose monitoring    Responsible User: Lauren Marie RD        Task: Refer patient to appropriate extended care team member, as needed (Medication Therapy Management, Behavioral Health, Physical Therapy, etc.)    Responsible User: Lauren Marie RD        Task: Discuss diabetes treatment plan with patient    Responsible User: Lauren Marie RD        Problem: Diabetes Self-Management Education Needed to Optimize Self-Care Behaviors         Goal: Understand diabetes pathophysiology and disease progression         Task: Provide education on diabetes pathophysiology and disease progression specfic to patient's diabetes type Completed 1/10/2024   Responsible User: Lauren Marie RD        Goal: Healthy Eating - follow a healthy eating pattern for diabetes         Task: Provide education on portion control and consistency in amount, composition and timing of food intake Completed 1/10/2024    Responsible User: Lauren Marie RD        Task: Provide education on managing carbohydrate intake (carbohydrate counting, plate planning method, etc.)    Responsible User: Lauren Marie RD        Task: Provide education on weight management    Responsible User: Lauren Marie RD        Task: Provide education on heart healthy eating    Responsible User: Lauren Marie RD        Task: Provide education on eating out    Responsible User: Lauren Marie RD        Task: Develop individualized healthy eating plan with patient    Responsible User: Lauren Marie RD        Goal: Being Active - get regular physical activity, working up to at least 150 minutes per week    This Visit's Progress: 100%   Note:    Be active for 30 minutes/day x 5 days/week         Task: Provide education on relationship of activity to glucose and precautions to take if at risk for low glucose    Responsible User: Lauren Marie RD        Task: Discuss barriers to physical activity with patient    Responsible User: Lauren Marie RD        Task: Develop physical activity plan with patient    Responsible User: Lauren Marie RD        Task: Explore community resources including walking groups, assistance programs, and home videos    Responsible User: Lauren Marie RD        Goal: Monitoring - monitor glucose and ketones as directed         Task: Provide education on blood glucose monitoring (purpose, proper technique, frequency, glucose targets, interpreting results, when to use glucose control solution, sharps disposal)    Responsible User: Lauren Marie RD        Task: Provide education on continuous glucose monitoring (sensor placement, use of jolanta or /reader, understanding glucose trends, alerts and alarms, differences between sensor glucose and blood glucose)    Responsible User: Lauren Marie RD        Task: Provide education on ketone monitoring (when to  monitor, frequency, etc.)    Responsible User: Lauren Marie RD        Goal: Taking Medication - patient is consistently taking medications as directed         Task: Provide education on action of prescribed medication, including when to take and possible side effects Completed 1/10/2024   Responsible User: Lauren Marie RD        Task: Provide education on insulin and injectable diabetes medications, including administration, storage, site selection and rotation for injection sites    Responsible User: Lauren Marie RD        Task: Discuss barriers to medication adherence with patient and provide management technique ideas as appropriate    Responsible User: Lauren Marie RD        Task: Provide education on frequency and refill details of medications    Responsible User: Lauren Marie RD        Goal: Problem Solving - know how to prevent and manage short-term diabetes complications         Task: Provide education on high blood glucose - causes, signs/symptoms, prevention and treatment    Responsible User: Lauren Marie RD        Task: Provide education on low blood glucose - causes, signs/symptoms, prevention, treatment, carrying a carbohydrate source at all times, and medical identification    Responsible User: Lauren Marie RD        Task: Provide education on safe travel with diabetes    Responsible User: Lauren Marie RD        Task: Provide education on how to care for diabetes on sick days    Responsible User: Lauren Marie RD        Task: Provide education on when to call a health care provider    Responsible User: Lauren Marie RD        Goal: Reducing Risks - know how to prevent and treat long-term diabetes complications         Task: Provide education on major complications of diabetes, prevention, early diagnostic measures and treatment of complications    Responsible User: Lauren Marie RD        Task: Provide education on  recommended care for dental, eye and foot health    Responsible User: Lauren Marie RD        Task: Provide education on Hemoglobin A1c - goals and relationship to blood glucose levels Completed 1/10/2024   Responsible User: Lauren Marie RD        Task: Provide education on recommendations for heart health - lipid levels and goals, blood pressure and goals, and aspirin therapy, if indicated    Responsible User: Lauren Marie RD        Task: Provide education on tobacco cessation    Responsible User: Lauren Marie RD        Goal: Healthy Coping - use available resources to cope with the challenges of managing diabetes         Task: Discuss recognizing feelings about having diabetes Completed 1/10/2024   Responsible User: Lauren Marie RD        Task: Provide education on the benefits of making appropriate lifestyle changes    Responsible User: Lauren Marie RD        Task: Provide education on benefits of utilizing support systems    Responsible User: Lauren Marie RD        Task: Discuss methods for coping with stress    Responsible User: Lauren Marie RD        Task: Provide education on when to seek professional counseling    Responsible User: Lauren Marie RD            Time Spent: 21 minutes  Encounter Type: Individual    Any diabetes medication dose changes were made via the CDE Protocol per the patient's referring provider. A copy of this encounter was shared with the provider.

## 2024-01-16 LAB — NONINV COLON CA DNA+OCC BLD SCRN STL QL: NEGATIVE

## 2024-01-24 ENCOUNTER — MYC REFILL (OUTPATIENT)
Dept: PSYCHIATRY | Facility: CLINIC | Age: 49
End: 2024-01-24
Payer: MEDICARE

## 2024-01-24 NOTE — TELEPHONE ENCOUNTER
1) RN received refill request from [id number] - [place], [place] - [address] AT [facility] [address] for sertraline (ZOLOFT) 100 MG tablet     2) This medication was last prescribed on 12/1/23 for qty of 180 with 0 refill(s).     3) Pt should not need a new prescription until around 3/1/24.    4) Therefore, RN sent reply back to pharmacy that refill request will be DENIED.       YOGI HUMPHREY RN on 1/24/2024 at 11:35 AM

## 2024-01-25 ENCOUNTER — OFFICE VISIT (OUTPATIENT)
Dept: FAMILY MEDICINE | Facility: CLINIC | Age: 49
End: 2024-01-25
Payer: MEDICARE

## 2024-01-25 VITALS
BODY MASS INDEX: 32.75 KG/M2 | HEART RATE: 70 BPM | WEIGHT: 156 LBS | TEMPERATURE: 96.8 F | RESPIRATION RATE: 19 BRPM | OXYGEN SATURATION: 96 % | DIASTOLIC BLOOD PRESSURE: 84 MMHG | HEIGHT: 58 IN | SYSTOLIC BLOOD PRESSURE: 118 MMHG

## 2024-01-25 DIAGNOSIS — H60.502 ACUTE NONINFECTIVE OTITIS EXTERNA OF LEFT EAR, UNSPECIFIED TYPE: ICD-10-CM

## 2024-01-25 DIAGNOSIS — H61.22 IMPACTED CERUMEN OF LEFT EAR: Primary | ICD-10-CM

## 2024-01-25 PROCEDURE — 99213 OFFICE O/P EST LOW 20 MIN: CPT | Mod: 25 | Performed by: PHYSICIAN ASSISTANT

## 2024-01-25 PROCEDURE — 69209 REMOVE IMPACTED EAR WAX UNI: CPT | Mod: LT | Performed by: PHYSICIAN ASSISTANT

## 2024-01-25 RX ORDER — OFLOXACIN 3 MG/ML
5 SOLUTION AURICULAR (OTIC) 2 TIMES DAILY
Qty: 10 ML | Refills: 0 | Status: SHIPPED | OUTPATIENT
Start: 2024-01-25 | End: 2024-02-01

## 2024-01-25 ASSESSMENT — PAIN SCALES - GENERAL: PAINLEVEL: MILD PAIN (3)

## 2024-01-25 NOTE — PROGRESS NOTES
"  Assessment & Plan     (H61.22) Impacted cerumen of left ear  (primary encounter diagnosis)  Comment:   Plan: See procedure note    (H60.502) Acute noninfective otitis externa of left ear, unspecified type  Comment:   Plan: ofloxacin (FLOXIN) 0.3 % otic solution        Based on residual erythema and edema in the canal after wax removal, patient given the above antibiotic eardrops, advised to keep ear clean and dry until symptoms have fully resolved.                Roseanna Bedolla is a 48 year old, presenting for the following health issues:  Ear Problem (Ear Pain)      1/25/2024     1:47 PM   Additional Questions   Roomed by Nathalia VALDIVIA   [facility]     Via the Health Maintenance questionnaire, the patient has reported the following services have been completed -Eye Exam, this information has been sent to the abstraction team.  Ear Problem    History of Present Illness       Reason for visit:  Ear pain    She eats 2-3 servings of fruits and vegetables daily.She consumes 0 sweetened beverage(s) daily.She exercises with enough effort to increase her heart rate 9 or less minutes per day.  She exercises with enough effort to increase her heart rate 3 or less days per week.   She is taking medications regularly.         Acute Illness  Acute illness concerns: L ear pain  Onset/Duration: 2 days  Symptoms:  Fever: No  Chills/Sweats: No  Headache (location?): No  Sinus Pressure: No  Conjunctivitis:  No  Ear Pain: YES- L, \"inner pain\" per patient, no pain with palpation, no drainage noted  Rhinorrhea: No  Congestion: No  Sore Throat: No  Cough: no  Wheeze: No  Decreased Appetite: No  Nausea: No  Vomiting: No  Diarrhea: No  Dysuria/Freq.: No  Dysuria or Hematuria: No  Fatigue/Achiness: No  Sick/Strep Exposure: No  Therapies tried and outcome: NSAID with some relief    No previous history of otitis media or recurrent, patient with known smaller ear canals, no history of cerumen impaction            "   Objective    LMP  (LMP Unknown)   There is no height or weight on file to calculate BMI.  Physical Exam   GENERAL: alert and no distress  HENT: normal cephalic/atraumatic, right ear: normal: no effusions, no erythema, normal landmarks, left ear: occluded with wax, nose and mouth without ulcers or lesions, oropharynx clear, and oral mucous membranes moist  NECK: no adenopathy, no asymmetry, masses, or scars  RESP: lungs clear to auscultation - no rales, rhonchi or wheezes  CV: regular rate and rhythm, normal S1 S2, no S3 or S4, no murmur, click or rub, no peripheral edema    Cerumen removal procedure note:  After obtaining verbal consent, both ear canals were irrigated with water, cerumen spoon was used to remove any additional wax from the ear canal, following the procedure bilateral tympanic membranes were intact and canals were edematous and erythematous. Patient tolerated the procedure well          Signed Electronically by: Bernardo Marsh PA-C

## 2024-01-26 ENCOUNTER — VIRTUAL VISIT (OUTPATIENT)
Dept: PSYCHIATRY | Facility: CLINIC | Age: 49
End: 2024-01-26
Payer: MEDICARE

## 2024-01-26 ENCOUNTER — VIRTUAL VISIT (OUTPATIENT)
Dept: BEHAVIORAL HEALTH | Facility: CLINIC | Age: 49
End: 2024-01-26
Payer: MEDICARE

## 2024-01-26 DIAGNOSIS — F41.1 GENERALIZED ANXIETY DISORDER: Primary | ICD-10-CM

## 2024-01-26 DIAGNOSIS — F41.1 ANXIETY STATE: Primary | ICD-10-CM

## 2024-01-26 PROCEDURE — 99213 OFFICE O/P EST LOW 20 MIN: CPT | Mod: 95 | Performed by: PSYCHIATRY & NEUROLOGY

## 2024-01-26 PROCEDURE — 90832 PSYTX W PT 30 MINUTES: CPT | Mod: 95 | Performed by: COUNSELOR

## 2024-01-26 RX ORDER — SERTRALINE HYDROCHLORIDE 100 MG/1
200 TABLET, FILM COATED ORAL DAILY
Qty: 180 TABLET | Refills: 0 | Status: SHIPPED | OUTPATIENT
Start: 2024-01-26 | End: 2024-03-08

## 2024-01-26 ASSESSMENT — ANXIETY QUESTIONNAIRES
1. FEELING NERVOUS, ANXIOUS, OR ON EDGE: NEARLY EVERY DAY
GAD7 TOTAL SCORE: 5
GAD7 TOTAL SCORE: 5
3. WORRYING TOO MUCH ABOUT DIFFERENT THINGS: SEVERAL DAYS
2. NOT BEING ABLE TO STOP OR CONTROL WORRYING: SEVERAL DAYS
6. BECOMING EASILY ANNOYED OR IRRITABLE: NOT AT ALL
7. FEELING AFRAID AS IF SOMETHING AWFUL MIGHT HAPPEN: NOT AT ALL
5. BEING SO RESTLESS THAT IT IS HARD TO SIT STILL: NOT AT ALL
IF YOU CHECKED OFF ANY PROBLEMS ON THIS QUESTIONNAIRE, HOW DIFFICULT HAVE THESE PROBLEMS MADE IT FOR YOU TO DO YOUR WORK, TAKE CARE OF THINGS AT HOME, OR GET ALONG WITH OTHER PEOPLE: NOT DIFFICULT AT ALL

## 2024-01-26 ASSESSMENT — PAIN SCALES - GENERAL: PAINLEVEL: NO PAIN (0)

## 2024-01-26 ASSESSMENT — PATIENT HEALTH QUESTIONNAIRE - PHQ9: 5. POOR APPETITE OR OVEREATING: NOT AT ALL

## 2024-01-26 NOTE — PATIENT INSTRUCTIONS
Moving from: Collaborative Care Psychiatry Service ([facility])    Moving to: Referring Provider        We are returning your care back to your Referring Provider.      We will update your Referring Provider/Clinic that you've completed your care with [facility]. This way, they can help you build on the progress you've made in your mental health.        Here's what happens next:    Within the next 3 months: Please set up a visit with your referring provider. Ask for a mental health check-in.  Stay on your current medications--do not change your doses. Your symptoms could get worse if you quickly stop or decrease your medicine.  We've refilled your mental health medications for the next 3 months (90 days). Future refills or dose changes should come from your Referring Provider Clinic.    If you're in therapy, keep it up! If you're not but would like to start, ask your Referring Provider clinic for a referral to therapy, or call [facility] ([phone]) to set up a visit with a therapist.        It's been a pleasure to work with you! If you and your clinic decide that you should return to [facility] in the future, we remain ready to serve you. Ask your clinic for a new referral if needed.

## 2024-01-26 NOTE — PROGRESS NOTES
[facility]  1/26/2024      Behavioral Health Clinician Progress Note    Patient Name: Chioma Mccormick           Service Type:  Individual      Service Location:   [facility] / Email (patient reached)     Session Start Time: 834am  Session End Time: 854am      Session Length: 16 - 37      Attendees: Patient and caregiver     Service Modality:  Video Visit:      Provider verified identity through the following two step process.  Patient provided:  Patient is known previously to provider and Patient was verified at admission/transfer    Telemedicine Visit: The patient's condition can be safely assessed and treated via synchronous audio and visual telemedicine encounter.      Reason for Telemedicine Visit: Services only offered telehealth    Originating Site (Patient Location): Patient's home    Distant Site (Provider Location): Provider Remote Setting- Home Office    Consent:  The patient/guardian has verbally consented to: the potential risks and benefits of telemedicine (video visit) versus in person care; bill my insurance or make self-payment for services provided; and responsibility for payment of non-covered services.     Patient would like the video invitation sent by:  My Chart    Mode of Communication:  Video Conference via [facility]    Distant Location (Provider):  Off-site    As the provider I attest to compliance with applicable laws and regulations related to telemedicine.    Visit Activities (Refresh list every visit): [facility] Only    Diagnostic Assessment Date: 11/3/2023 Dora Mirza  Treatment Plan Review Date: 4/26/24  See Flowsheets for today's PHQ-9 and FOUZIA-7 results  Previous PHQ-9:       11/3/2023     7:12 AM   PHQ-9 SCORE   PHQ-9 Total Score MyChart 2 (Minimal depression)   PHQ-9 Total Score 2    2   See PHQ2.     Previous FOUZIA-7:       11/3/2023     7:14 AM 1/26/2024     8:50 AM   FOUZIA-7 SCORE   Total Score 14 (moderate anxiety)    Total Score 14     14 5           DATA  Extended Session (60+ minutes): No  Interactive Complexity: No  Crisis: No  [facility] Patient: No    Treatment Objective(s) Addressed in This Session:  Target Behavior(s):  continue to use skills to manage worry    Anxiety: will experience a reduction in anxiety, will develop more effective coping skills to manage anxiety symptoms, and will increase ability to function adaptively    Current Stressors / Issues:  [facility] updates GAD7 with pt during the visit.     Pt says that anxiety and improved. Last Tuesday the bus very late. Pt used her coping skills and she was able to manage. Using fidget spinner, Ipad, breathing, and use puppets. She was able to use these skills without being reminded. Some days are busy at work. Pt is working to be be patient about the unknown. She will walk herself through deep breathing. She will still wait by the window for the bus. If she get worried she will use her things and she has them in her bag. She just got a new phone. Pt will play games to calm down on her phone. She is not worrying as much. She will worry more waiting for the bus a couple times a week. Pt will do things to keep themselves calm. Pt is going to [place] in May.     Sleep: sleeping through the night, if work more into the evening it doesn't take them awhile to wind down  Appetite is still good  Diet Coke at work and drink ice drinks at home   Therapist: talk to them about things they are doing       Progress on Treatment Objective(s) / Homework:  Satisfactory progress - ACTION (Actively working towards change); Intervened by reinforcing change plan / affirming steps taken    CBT: Pt is able to use their coping skills and is reporting feeling less anxious.   [facility] explores ways that pt can continue to do this to maintain their progress.     Motivational Interviewing    MI Intervention: Co-Developed Goal: reduce worry about her ride being late to work, Expressed Empathy/Understanding, Supported Autonomy,  Collaboration, Evocation, Permission to raise concern or advise, Open-ended questions, and Reflections: simple and complex     Change Talk Expressed by the Patient: Committment to change Activation Taking steps    Provider Response to Change Talk: E - Evoked more info from patient about behavior change and A - Affirmed patient's thoughts, decisions, or attempts at behavior change    Also provided psychoeducation about behavioral health condition, symptoms, and treatment options    Assessments completed prior to visit:  The following assessments were completed by patient for this visit:  PHQ2:       1/26/2024     8:25 AM 1/25/2024     1:36 PM 12/1/2023     8:51 AM 3/21/2023     3:21 PM 3/21/2023     3:17 PM   PHQ-2 ( 1999 [employer])   Q1: Little interest or pleasure in doing things 0 0 0 0 0   Q2: Feeling down, depressed or hopeless 0 0 0 0 0   PHQ-2 Score 0 0 0    0 0 0   Q1: Little interest or pleasure in doing things  Not at all Not at all Not at all    Q2: Feeling down, depressed or hopeless  Not at all Not at all Not at all    PHQ-2 Score  0 0 0        Care Plan review completed: No    Medication Review:  No changes to current psychiatric medication(s)    Medication Compliance:  Yes    Changes in Health Issues:   None reported    Chemical Use Review:   Substance Use: Chemical use reviewed, no active concerns identified      Tobacco Use: No current tobacco use.      Assessment: Current Emotional / Mental Status (status of significant symptoms):  Risk status (Self / Other harm or suicidal ideation)  Patient denies a history of suicidal ideation, suicide attempts, self-injurious behavior, homicidal ideation, homicidal behavior, and and other safety concerns  Patient denies current fears or concerns for personal safety.  Patient denies current or recent suicidal ideation or behaviors.  Patient denies current or recent homicidal ideation or behaviors.  Patient denies current or recent self injurious behavior or  ideation.  Patient denies other safety concerns.  A safety and risk management plan has not been developed at this time, however patient was encouraged to call [place] [phone] should there be a change in any of these risk factors.    Appearance:   Appropriate   Eye Contact:   Good   Psychomotor Behavior: Normal   Attitude:   Cooperative  Friendly  Orientation:   All  Speech   Rate / Production: Normal    Volume:  Normal   Mood:    Anxious , though improving  Affect:    Appropriate   Thought Content:  Clear   Thought Form:  Coherent  Logical   Insight:    Fair     Diagnoses:  1. Generalized anxiety disorder      Collateral Reports Completed:  Communicated with: Jay Jay Wolfe M.D.     Plan: (Homework, other):  Patient was given information about behavioral services and encouraged to schedule a follow up appointment with the clinic [facility] as needed.  She was also given information about mental health symptoms and treatment options .  CD Recommendations: No indications of CD issues.       Nupur Oneill, [facility]    ______________________________________________________________________    Integrated Primary Care Behavioral Health Treatment Plan    Patient's Name: Chioma Mccormick  YOB: 1975    Date of Creation: 1/26/2024  Date Treatment Plan Last Reviewed/Revised: 1/26/2024    DSM5 Diagnoses: 300.02 (F41.1) Generalized Anxiety Disorder  Psychosocial / Contextual Factors: works, has caregiver, worry  PROMIS (reviewed every 90 days):   PROMIS 10-Global Health (only subscores and total score):       11/3/2023     7:17 AM   PROMIS-10 Scores Only   Global Mental Health Score 10    10   Global Physical Health Score 16    16   PROMIS TOTAL - SUBSCORES 26    26       Referral / Collaboration:  Referral to another professional/service is not indicated at this time.    Anticipated number of session for this episode of care: 5-6  Anticipation frequency of session: Monthly  Anticipated Duration of each session: 16-37  minutes  Treatment plan will be reviewed in 90 days or when goals have been changed.       MeasurableTreatment Goal(s) related to diagnosis / functional impairment(s)  Goal 1: Patient will reduce anxiety     I will know I've met my goal when I used my coping skills and it is less.       Objective #A (Patient Action)                          Patient will continue to use coping skills to manage worry.  Status:  New:1/26/2024       Intervention(s)  Therapist will provide support through MI, Acceptance and Commitment Therapy and problem solving model to explore and overcome barriers.      Patient has reviewed and agreed to the above plan.      Nupur Oneill, SANDY  1/26/2024

## 2024-01-26 NOTE — PROGRESS NOTES
Virtual Visit Details    Type of service:  Video Visit   Video Start Time: 9:03 AM  Video End Time:9:11 AM    Originating Location (pt. Location): Home    Distant Location (provider location):  On-site  Platform used for Video Visit: [facility] Psychiatry Consult Note    IDENTIFICATION   Name: Chioma Mccormick   : 1975/48 year old      Sex:    @ female          Telemedicine Visit: The patient's condition can be safely assessed and treated via synchronous audio and visual telemedicine encounter.        Face to Face/patient Contact total time: 8 minutes  Pre Charting time: 0 minutes; Post charting time, communication and other activities: 7 minutes; Total time 15 minutes            SUBJECTIVE   Anxiety better. Worries to some extent but it is now manageable. She is coping skills well. One day bus did not show up anywhere on time and will be late to work - called work and used coping skills. Usually before would be pacing and nervous. She was unhappy with it the delay but managed around it. Workting with therapist.     Discusses coping skills, breathing. Talking.       The following assessments were completed by patient for this visit:  PROMIS 10-Global Health (all questions and answers displayed):       11/3/2023     7:17 AM   PROMIS 10   In general, would you say your health is: Good   In general, would you say your quality of life is: Good   In general, how would you rate your physical health? Good   In general, how would you rate your mental health, including your mood and your ability to think? Good   In general, how would you rate your satisfaction with your social activities and relationships? Good   In general, please rate how well you carry out your usual social activities and roles Good   To what extent are you able to carry out your everyday physical activities such as walking, climbing stairs, carrying groceries, or moving a chair? Completely   In the past 7 days, how often have you  been bothered by emotional problems such as feeling anxious, depressed, or irritable? Always   In the past 7 days, how would you rate your fatigue on average? Moderate   In the past 7 days, how would you rate your pain on average, where 0 means no pain, and 10 means worst imaginable pain? 0   In general, would you say your health is: 3   In general, would you say your quality of life is: 3   In general, how would you rate your physical health? 3   In general, how would you rate your mental health, including your mood and your ability to think? 3   In general, how would you rate your satisfaction with your social activities and relationships? 3   In general, please rate how well you carry out your usual social activities and roles. (This includes activities at home, at work and in your community, and responsibilities as a parent, child, spouse, employee, friend, etc.) 3   To what extent are you able to carry out your everyday physical activities such as walking, climbing stairs, carrying groceries, or moving a chair? 5   In the past 7 days, how often have you been bothered by emotional problems such as feeling anxious, depressed, or irritable? 5   In the past 7 days, how would you rate your fatigue on average? 3   In the past 7 days, how would you rate your pain on average, where 0 means no pain, and 10 means worst imaginable pain? 0   Global Mental Health Score 10    10   Global Physical Health Score 16    16   PROMIS TOTAL - SUBSCORES 26    26             11/3/2023     7:12 AM   PHQ   PHQ-9 Total Score 2    2   Q9: Thoughts of better off dead/self-harm past 2 weeks Not at all          11/3/2023     7:14 AM 1/26/2024     8:50 AM   FOUZIA-7 SCORE   Total Score 14 (moderate anxiety)    Total Score 14    14 5        OBJECTIVE     Vital Signs:   LMP  (LMP Unknown)     Labs:  na    Current Medications:  Current Outpatient Medications   Medication    acetaminophen (TYLENOL) 500 MG tablet    blood glucose (CONTOUR TEST) test  strip    blood glucose (NO BRAND SPECIFIED) lancets standard    Calcium Carb-Cholecalciferol (OYSCO 500 + D) 500-5 MG-MCG TABS    ibuprofen (ADVIL/MOTRIN) 100 MG tablet    metFORMIN (GLUCOPHAGE) 1000 MG tablet    ofloxacin (FLOXIN) 0.3 % otic solution    sertraline (ZOLOFT) 100 MG tablet    simvastatin (ZOCOR) 10 MG tablet     No current facility-administered medications for this visit.        The [place] Prescription Monitoring Program has been reviewed and there are no concerns about diversionary activity for controlled substances at this time.        ADDED HISTORY   na    MENTAL STATUS EXAMINATION:   Appearance: Intact attention to grooming and hygiene, pink hair  Attitude: Cooperative  Eye Contact: Fair  Gait and Station: Sitting  Psychomotor Behavior: Within normal limits  Oriented to: Grossly person place and time  Attention Span and Concentration: Grossly intact  Speech: Minimally anxious tone  Language: English  Mood: Good  Affect: minimally restricted  Associations:  no loose associations  Thought Process:  logical, linear and goal oriented  Thought Content: No evidence of delusions or suicidal or homicidal ideation plan or intent  Memory: Grossly intact  Fund of Knowledge: Grossly intact  Insight:  fair  Judgment:  intact, adequate for safety  Impulse Control:  intact        DIAGNOSES:   Unspecified anxiety disorder  Down Syndrome  Rule out autism spectrum disorder      ASSESSMENT:   Patient dealing with anxiety, degree of rigidity and if things are not on time for her schedule anxiety ramps up.  This is the biggest trigger-specifically transportation to and from work with [employer] and variation with timing and sometimes not having a ride pick her up.  Sertraline titration has been minimally efficacious, titrate further efficacious enough at 200 mg to assist with her using coping skills successfully..  Failed trial of propranolol.  It is more appropriate to have a psychosocial intervention rather  than medication and dependence letter written for medical necessity for on demand service. Is doing therapy to help address.    Today Chioma Mccormick reports no suicidal ideations. In addition, she has notable risk factors for self-harm, including anxiety. However, risk is mitigated by commitment to family and Sabianist beliefs. Therefore, based on all available evidence including the factors cited above, she does not appear to be at imminent risk for self-harm, does not meet criteria for a 72-hr hold, and therefore remains appropriate for ongoing outpatient level of care.       PLAN:     Consult completed  Does not meet criteria for involuntary treatment or hospitalization  Sertraline 100 mg daily => 11/3/2023 150 mg daily better using coping skills => 12/1/23 200 mg po qday efficacy without side effects-Risks, benefits and alternatives discussed.  Patient provides verbal consent to treatment.  Advised to further use coping skills with med titration - fu  Dc'd propranolol (ineffective)  Labs-CMP recommended  Letter written for recommending on demand/Lyft service to address anxiety  Undergoing psychotherapy - limitation  Return to PCP 3/8/2024    Administrative Billing:   Time spent with patient was greater than 50% of time and/or significant time was spent in counseling and coordination of care regarding above diagnoses and treatment plan. Pre charting time and post charting time/documentation/coordination are done on date of service.      Signed:   Jay Jay Wolfe M.D.  formerly [facility]    Disclaimer: This note consists of symbols derived from keyboarding, dictation and/or voice recognition software. As a result, there may be errors in the script that have gone undetected. Please consider this when interpreting information found in this chart.    eoc  RETURN TO REFERRING PROVIDER FINAL TREATMENT PLAN  The Psychiatric provider and/or Behavioral health clinician (BHC) have completed consultation  with the patient and are returning to referring provider care for continued care. For worsening symptoms/condition recommendations include:    Medication Recommendations   -Sertraline is at the highest dose  -For further needs for anxiety management, may add buspirone typically 15 mg half tablet twice a day for 7 days then increase to 15 mg p.o. twice daily.  Titrate by 10-20 mg increments up to 30 mg p.o. twice daily or 15 mg p.o. 3 times daily  -If an alternative is needed maced cross taper to Lexapro 5 to 10 mg with titration up to 20 mg daily, given tolerability with medications previously would suggest starting at 10 and quicker titration to 20 mg.  On efficacy, taper sertraline by 50 mg increments every 2 to 3 weeks until discontinued  -Alternatively could consider Luvox 25 mg nightly for 5 nights then 50 mg nightly.  Titrate by 25-50 mg increments up to 100 mg p.o. twice daily    Behavioral Recommendations  Encouragement to if anxiety is worse, make sure she is seeing a therapist        Consider pharmacologic and nonpharmacologic recommendations, if the patient has followed through on recommendations/referrals and any other helpful pertinent information (e.g., recent stressors, med adherence, enough time on the medication or high enough dose etc.)      If post consult recommendations fail, use any of the following options   Reach out to the [facility] provider through Epic staff message for guidance   Order an Adult Behavioral Health eConsult ([id number]) or Pediatric eConsult ([id number])   Refer for another [facility] consultation (after 6 months) or refer to Psychiatry/Long-term management (Mental Health Referral 9018, [facility]/Med management and Long-term management)

## 2024-01-26 NOTE — Clinical Note
Akilah,  Patient is doing well, sent to our standard staff message for patient's completing consult and returning to primary care.  I did order a CMP, just general screening as you know once a year but advised them to wait until their appointment with you so that she is not having 2 different lab draws.  Zoloft efficacious enough to help her manage her anxiety with coping skills.  Jay Jay

## 2024-01-30 ENCOUNTER — TRANSFERRED RECORDS (OUTPATIENT)
Dept: HEALTH INFORMATION MANAGEMENT | Facility: CLINIC | Age: 49
End: 2024-01-30
Payer: MEDICARE

## 2024-02-07 ENCOUNTER — OFFICE VISIT (OUTPATIENT)
Dept: INTERNAL MEDICINE | Facility: CLINIC | Age: 49
End: 2024-02-07
Payer: MEDICARE

## 2024-02-07 VITALS
WEIGHT: 150 LBS | BODY MASS INDEX: 30.24 KG/M2 | HEIGHT: 59 IN | OXYGEN SATURATION: 93 % | HEART RATE: 67 BPM | DIASTOLIC BLOOD PRESSURE: 78 MMHG | TEMPERATURE: 97.4 F | SYSTOLIC BLOOD PRESSURE: 100 MMHG | RESPIRATION RATE: 18 BRPM

## 2024-02-07 DIAGNOSIS — H93.8X2 EAR CONGESTION, LEFT: Primary | ICD-10-CM

## 2024-02-07 PROCEDURE — 99213 OFFICE O/P EST LOW 20 MIN: CPT | Performed by: INTERNAL MEDICINE

## 2024-02-07 NOTE — PATIENT INSTRUCTIONS
Your ear canal is very narrow, and may have a pocket deeper down next to the eardrum.  I would recommend an ENT clinic examining your ear in more detail and cleaning out any earwax with a suction device, which is safer than our water irrigation system here.    You have minimal wax currently and do not need to continue the earwax drops at this time.

## 2024-02-07 NOTE — PROGRESS NOTES
Chioma Mccormick   48 year old female    Date of Visit: 2/7/2024    Chief Complaint   Patient presents with    Ear Problem     Lt ear plugged, has used ear drops for 1 week.     Subjective  48-year-old female with Down syndrome here for an outside clinic because of left ear congestion.  She just had her earwax removed on January 25 and has been using earwax drops.  But she still complains of a congested left ear.  No ear pain.  No sinus pain.  No fever.  No headache.    She has had her earwax cleaned multiple times in the past.    PMHx:  No past medical history on file.  PSHx:  No past surgical history on file.  Immunizations:   Immunization History   Administered Date(s) Administered    COVID-19 12+ (2023-24) (Pfizer) 10/11/2023    COVID-19 Bivalent 12+ (Pfizer) 09/22/2022    COVID-19 MONOVALENT 12+ (Pfizer) 03/22/2021, 04/12/2021, 11/03/2021    DT (PEDS <7y) 12/21/2004    Flu, Unspecified 11/17/2007, 10/25/2008, 11/18/2011, 11/01/2012    HepA, Unspecified 09/06/2004, 03/15/2007, 08/29/2008    Hepatitis A (ADULT 19+) 09/06/2004, 03/15/2007, 08/29/2008    Influenza (IIV3) PF 11/22/2005, 11/21/2006, 11/17/2007, 10/25/2008, 11/18/2011, 09/19/2012, 11/01/2012, 10/03/2013    Influenza Vaccine >6 months,quad, PF 09/06/2019, 09/08/2021, 09/22/2022    Influenza Vaccine, 6+MO IM (QUADRIVALENT W/PRESERVATIVES) 09/01/2016, 09/20/2017    Influenza, seasonal, injectable, PF 11/18/2009, 09/29/2010, 10/04/2013    Influenza,INJ,MDCK,PF,Quad >6mo(Flucelvax) 10/16/2018, 10/08/2020, 09/21/2023    Pneumococcal 20 valent Conjugate (Prevnar 20) 03/21/2023    Pneumococcal 23 valent 12/22/2011    TD,PF 7+ (Tenivac) 01/16/2019    TDAP (Adacel,Boostrix) 08/29/2008    Td,adult,historic,unspecified 08/29/2008       ROS A comprehensive review of systems was performed and was otherwise negative    Medications, allergies, and problem list were reviewed and updated    Exam  /78   Pulse 67   Temp 97.4  F (36.3  C)   Resp 18   Ht  "1.486 m (4' 10.5\")   Wt 68 kg (150 lb)   LMP  (LMP Unknown)   SpO2 93%   BMI 30.82 kg/m    She does have very narrow ear canals, congenital.  Past the extreme narrow area does appear to be more of a dilated bladder area next to her tympanic membrane.  There may be some pocketing of wax in that corner, but difficult to visualize.  I can partially visualize the tympanic membrane and that appears normal, not inflamed.  There is no otitis externa.  Her right tympanic membrane appears similar, with very narrow ear canal.    Assessment/Plan  1. Ear congestion, left  Narrow ear canals, with possible pocketing of wax or fluid next to the eardrum which may cause a congestion sensation.  She does not appear to be impacted with cerumen at this time.    I did not recommend water irrigation of the eardrum as there would be risk of your perforation or trauma, especially with her very narrow ear canals.  I recommended ENT evaluation, and suction removal of earwax if needed.  - Adult ENT  Referral; Future      Return in about 1 week (around 2/14/2024) for ENT clinic.   Patient Instructions   Your ear canal is very narrow, and may have a pocket deeper down next to the eardrum.  I would recommend an ENT clinic examining your ear in more detail and cleaning out any earwax with a suction device, which is safer than our water irrigation system here.    You have minimal wax currently and do not need to continue the earwax drops at this time.    Benjie Reynoso MD, MD        Current Outpatient Medications   Medication Sig Dispense Refill    acetaminophen (TYLENOL) 500 MG tablet Take 500-1,000 mg by mouth every 6 hours as needed for pain      blood glucose (CONTOUR TEST) test strip [BLOOD GLUCOSE TEST (CONTOUR TEST STRIPS) STRIPS] Check blood sugar once daily.  Dispense brand per patient's insurance at pharmacy discretion. 200 strip 3    blood glucose (NO BRAND SPECIFIED) lancets standard Use to test blood sugar once daily or " as directed. 200 each 3    Calcium Carb-Cholecalciferol (OYSCO 500 + D) 500-5 MG-MCG TABS Take 1 tablet by mouth 2 times daily (with meals) 60 tablet 11    ibuprofen (ADVIL/MOTRIN) 100 MG tablet Take 400 mg by mouth every 6 hours as needed (for minor pain relief)      metFORMIN (GLUCOPHAGE) 1000 MG tablet Take 1 tablet (1,000 mg) by mouth daily (with dinner) 90 tablet 1    Pediatric Multivit-Minerals (MULTIVITAMIN CHILDRENS GUMMIES PO)       sertraline (ZOLOFT) 100 MG tablet Take 2 tablets (200 mg) by mouth daily 180 tablet 0    simvastatin (ZOCOR) 10 MG tablet Take 1 tablet (10 mg) by mouth At Bedtime 90 tablet 1     No Known Allergies  Social History     Tobacco Use    Smoking status: Never     Passive exposure: Never    Smokeless tobacco: Never   Vaping Use    Vaping Use: Never used   Substance Use Topics    Alcohol use: No    Drug use: No             Subjective   Chioma is a 48 year old, presenting for the following health issues:  No chief complaint on file.    Via the Health Maintenance questionnaire, the patient has reported the following services have been completed -Eye Exam, this information has been sent to the abstraction team.  Ear Problem    History of Present Illness       Reason for visit:  Ear pain    She eats 2-3 servings of fruits and vegetables daily.She consumes 0 sweetened beverage(s) daily.She exercises with enough effort to increase her heart rate 9 or less minutes per day.  She exercises with enough effort to increase her heart rate 3 or less days per week.   She is taking medications regularly.                     Objective    LMP  (LMP Unknown)   There is no height or weight on file to calculate BMI.  Physical Exam               Signed Electronically by: Benjie Reynoso MD

## 2024-03-06 ENCOUNTER — LAB (OUTPATIENT)
Dept: LAB | Facility: CLINIC | Age: 49
End: 2024-03-06
Payer: MEDICARE

## 2024-03-06 DIAGNOSIS — F41.1 ANXIETY STATE: ICD-10-CM

## 2024-03-06 DIAGNOSIS — E11.9 TYPE 2 DIABETES MELLITUS WITHOUT COMPLICATION, WITHOUT LONG-TERM CURRENT USE OF INSULIN (H): ICD-10-CM

## 2024-03-06 LAB — HBA1C MFR BLD: 8.6 % (ref 0–5.6)

## 2024-03-06 PROCEDURE — 83036 HEMOGLOBIN GLYCOSYLATED A1C: CPT

## 2024-03-06 PROCEDURE — 36415 COLL VENOUS BLD VENIPUNCTURE: CPT

## 2024-03-06 PROCEDURE — 80053 COMPREHEN METABOLIC PANEL: CPT

## 2024-03-07 LAB
ALBUMIN SERPL BCG-MCNC: 4.5 G/DL (ref 3.5–5.2)
ALP SERPL-CCNC: 90 U/L (ref 40–150)
ALT SERPL W P-5'-P-CCNC: 26 U/L (ref 0–50)
ANION GAP SERPL CALCULATED.3IONS-SCNC: 9 MMOL/L (ref 7–15)
AST SERPL W P-5'-P-CCNC: 26 U/L (ref 0–45)
BILIRUB SERPL-MCNC: 0.5 MG/DL
BUN SERPL-MCNC: 12.2 MG/DL (ref 6–20)
CALCIUM SERPL-MCNC: 9.6 MG/DL (ref 8.6–10)
CHLORIDE SERPL-SCNC: 103 MMOL/L (ref 98–107)
CREAT SERPL-MCNC: 0.64 MG/DL (ref 0.51–0.95)
DEPRECATED HCO3 PLAS-SCNC: 30 MMOL/L (ref 22–29)
EGFRCR SERPLBLD CKD-EPI 2021: >90 ML/MIN/1.73M2
GLUCOSE SERPL-MCNC: 263 MG/DL (ref 70–99)
POTASSIUM SERPL-SCNC: 4.7 MMOL/L (ref 3.4–5.3)
PROT SERPL-MCNC: 7.2 G/DL (ref 6.4–8.3)
SODIUM SERPL-SCNC: 142 MMOL/L (ref 135–145)

## 2024-03-08 ENCOUNTER — VIRTUAL VISIT (OUTPATIENT)
Dept: FAMILY MEDICINE | Facility: CLINIC | Age: 49
End: 2024-03-08
Payer: MEDICARE

## 2024-03-08 DIAGNOSIS — F41.1 ANXIETY STATE: ICD-10-CM

## 2024-03-08 DIAGNOSIS — E11.9 TYPE 2 DIABETES MELLITUS WITHOUT COMPLICATION, WITHOUT LONG-TERM CURRENT USE OF INSULIN (H): Primary | ICD-10-CM

## 2024-03-08 PROCEDURE — 99214 OFFICE O/P EST MOD 30 MIN: CPT | Mod: 95 | Performed by: PHYSICIAN ASSISTANT

## 2024-03-08 RX ORDER — SERTRALINE HYDROCHLORIDE 100 MG/1
200 TABLET, FILM COATED ORAL DAILY
Qty: 180 TABLET | Refills: 3 | Status: SHIPPED | OUTPATIENT
Start: 2024-03-08

## 2024-03-08 NOTE — PROGRESS NOTES
Chioma is a 49 year old who is being evaluated via a billable video visit.      How would you like to obtain your AVS? MyChart  If the video visit is dropped, the invitation should be resent by: Text to cell phone: [phone]  Will anyone else be joining your video visit? No          Assessment & Plan     Type 2 diabetes mellitus without complication, without long-term current use of insulin (H)  Worsening with increased A1c.  Discussed treatment options we will add metformin 500 mg on in the morning she is already taking 1000 mg in the evening she notes she is eating [employer] frequently because she works there but will change it and only have [employer] once a week on Fridays.  She will also try not to eat as many sweets.  Notes if she is offered a cookie or treats that she will take them so she will try to limit herself to 1 treat at a time    Anxiety  Improving taking Zoloft 200 mg daily tolerating this well she is seeing a therapist weekly and this is going well      Follow-up 3 months        Subjective   Chioma is a 49 year old, presenting for the following health issues:  Medication Follow-up      3/8/2024     8:53 AM   Additional Questions   Roomed by Rhea LANE     Anxiety- she is seeing therapy and feels anxiety is improving and she is taking Zoloft 200 mg daily and this is going well she had been seeing psychiatry but is stable and I will take over prescribing the Zoloft.    Diabetes- A1c increased.  Walks the dog daily, goes to [employer] once a week, goes to the gym once a week.  She is eating a lot of [employer], it is easy to get since she works there.  Taking metformin 1000 mg daily and tolerating well      Review of Systems  Constitutional, HEENT, cardiovascular, pulmonary, gi and gu systems are negative, except as otherwise noted.      Objective           Vitals:  No vitals were obtained today due to virtual visit.    Physical Exam   GENERAL: alert and no distress  EYES: Eyes grossly normal  to inspection.  No discharge or erythema, or obvious scleral/conjunctival abnormalities.  RESP: No audible wheeze, cough, or visible cyanosis.    SKIN: Visible skin clear. No significant rash, abnormal pigmentation or lesions.  NEURO: Cranial nerves grossly intact.  Mentation and speech appropriate for age.  PSYCH: Appropriate affect, tone, and pace of words          Video-Visit Details      Originating Location (pt. Location): Home    Distant Location (provider location):  On-site  Platform used for Video Visit: Alvarez  Signed Electronically by: Akilah Navarro PA-C

## 2024-04-05 ENCOUNTER — TRANSFERRED RECORDS (OUTPATIENT)
Dept: MULTI SPECIALTY CLINIC | Facility: CLINIC | Age: 49
End: 2024-04-05
Payer: MEDICARE

## 2024-04-05 LAB — RETINOPATHY: NORMAL

## 2024-04-12 ENCOUNTER — HOSPITAL ENCOUNTER (OUTPATIENT)
Dept: MAMMOGRAPHY | Facility: CLINIC | Age: 49
Discharge: HOME | End: 2024-04-12
Attending: PHYSICIAN ASSISTANT | Admitting: PHYSICIAN ASSISTANT
Payer: MEDICARE

## 2024-04-12 DIAGNOSIS — Z12.31 VISIT FOR SCREENING MAMMOGRAM: ICD-10-CM

## 2024-04-12 PROCEDURE — 77067 SCR MAMMO BI INCL CAD: CPT

## 2024-04-17 DIAGNOSIS — E11.9 TYPE 2 DIABETES MELLITUS WITHOUT COMPLICATION, WITHOUT LONG-TERM CURRENT USE OF INSULIN (H): ICD-10-CM

## 2024-04-19 ENCOUNTER — OFFICE VISIT (OUTPATIENT)
Dept: FAMILY MEDICINE | Facility: CLINIC | Age: 49
End: 2024-04-19
Payer: MEDICARE

## 2024-04-19 ENCOUNTER — MEDICAL CORRESPONDENCE (OUTPATIENT)
Dept: HEALTH INFORMATION MANAGEMENT | Facility: CLINIC | Age: 49
End: 2024-04-19

## 2024-04-19 VITALS
TEMPERATURE: 96.9 F | WEIGHT: 149 LBS | SYSTOLIC BLOOD PRESSURE: 98 MMHG | DIASTOLIC BLOOD PRESSURE: 80 MMHG | HEART RATE: 67 BPM | HEIGHT: 59 IN | BODY MASS INDEX: 30.04 KG/M2 | OXYGEN SATURATION: 96 %

## 2024-04-19 DIAGNOSIS — Z12.4 CERVICAL CANCER SCREENING: Primary | ICD-10-CM

## 2024-04-19 DIAGNOSIS — Z00.00 ENCOUNTER FOR MEDICARE ANNUAL WELLNESS EXAM: ICD-10-CM

## 2024-04-19 DIAGNOSIS — Z23 NEED FOR PROPHYLACTIC VACCINATION AGAINST HEPATITIS B VIRUS: ICD-10-CM

## 2024-04-19 PROCEDURE — G0010 ADMIN HEPATITIS B VACCINE: HCPCS | Performed by: PHYSICIAN ASSISTANT

## 2024-04-19 PROCEDURE — 90746 HEPB VACCINE 3 DOSE ADULT IM: CPT | Performed by: PHYSICIAN ASSISTANT

## 2024-04-19 PROCEDURE — G0439 PPPS, SUBSEQ VISIT: HCPCS | Performed by: PHYSICIAN ASSISTANT

## 2024-04-19 SDOH — HEALTH STABILITY: PHYSICAL HEALTH: ON AVERAGE, HOW MANY DAYS PER WEEK DO YOU ENGAGE IN MODERATE TO STRENUOUS EXERCISE (LIKE A BRISK WALK)?: 3 DAYS

## 2024-04-19 ASSESSMENT — SOCIAL DETERMINANTS OF HEALTH (SDOH): HOW OFTEN DO YOU GET TOGETHER WITH FRIENDS OR RELATIVES?: TWICE A WEEK

## 2024-04-19 NOTE — PROGRESS NOTES
Preventive Care Visit  [facility] RENA Navarro PA-C, Family Medicine  Apr 19, 2024      SUBJECTIVE:   Chioma is a 49 year old, presenting for the following:  Wellness Visit        4/19/2024     7:04 AM   Additional Questions   Roomed by Rhea LEOS   Accompanied by MARE Rivas         4/19/2024   Forms   Any forms needing to be completed Yes     Are you in the first 12 months of your Medicare coverage?  No    HPI    Today's PHQ-2 Score:       4/19/2024     7:03 AM   PHQ-2 ( 1999 Pfizer)   Q1: Little interest or pleasure in doing things 0   Q2: Feeling down, depressed or hopeless 0   PHQ-2 Score 0   Q1: Little interest or pleasure in doing things Not at all   Q2: Feeling down, depressed or hopeless Not at all   PHQ-2 Score 0           Via the Health Maintenance questionnaire, the patient has reported the following services have been completed -Eye Exam, this information has been sent to the abstraction team.  Have you ever done Advance Care Planning? (For example, a Health Directive, POLST, or a discussion with a medical provider or your loved ones about your wishes): No, advance care planning information given to patient to review.  Patient plans to discuss their wishes with loved ones or provider.         Fall risk  Fallen 2 or more times in the past year?: No    Cognitive Screening   1) Repeat 3 items   2) Clock draw: NORMAL  3) 3 item recall: Recalls 3 objects  Results: 3 items recalled: COGNITIVE IMPAIRMENT LESS LIKELY    Mini-CogTM Copyright MAHENDRA Coker. Licensed by the author for use in [facility]; reprinted with permission (genie@.[facility]). All rights reserved.      Do you have sleep apnea, excessive snoring or daytime drowsiness? : no    Reviewed and updated as needed this visit by clinical staff   Tobacco   Meds              Reviewed and updated as needed this visit by Provider                  Social History     Tobacco Use    Smoking status: Never     Passive exposure:  Never    Smokeless tobacco: Never   Substance Use Topics    Alcohol use: No             4/19/2024     7:03 AM   Alcohol Use   Prescreen: >3 drinks/day or >7 drinks/week? Not Applicable     Do you have a current opioid prescription? No  Do you use any other controlled substances or medications that are not prescribed by a provider? None              Current providers sharing in care for this patient include:   Patient Care Team:  Akilah Navarro PA-C as PCP - General (Family Medicine)  Akilah Navarro PA-C as Assigned PCP  Jay Jay Wolfe MD as Assigned Behavioral Health Provider    The following health maintenance items are reviewed in Epic and correct as of today:  Health Maintenance   Topic Date Due    HEPATITIS B IMMUNIZATION (1 of 3 - 19+ 3-dose series) Never done    DIABETIC FOOT EXAM  07/05/2023    EYE EXAM  01/11/2024    MEDICARE ANNUAL WELLNESS VISIT  03/21/2024    HPV TEST  04/17/2024    PAP  04/17/2024    MICROALBUMIN  06/14/2024    A1C  09/06/2024    LIPID  12/20/2024    BMP  03/06/2025    ANNUAL REVIEW OF HM ORDERS  03/08/2025    MAMMO SCREENING  04/12/2026    COLORECTAL CANCER SCREENING  01/03/2027    ADVANCE CARE PLANNING  03/21/2028    DTAP/TDAP/TD IMMUNIZATION (3 - Td or Tdap) 01/16/2029    HEPATITIS C SCREENING  Completed    HIV SCREENING  Completed    PHQ-2 (once per calendar year)  Completed    INFLUENZA VACCINE  Completed    Pneumococcal Vaccine: Pediatrics (0 to 5 Years) and At-Risk Patients (6 to 64 Years)  Completed    COVID-19 Vaccine  Completed    IPV IMMUNIZATION  Aged Out    HPV IMMUNIZATION  Aged Out    MENINGITIS IMMUNIZATION  Aged Out    RSV MONOCLONAL ANTIBODY  Aged Out               3/21/2023     3:22 PM 4/5/2023    11:04 AM 4/19/2024     7:05 AM   Breast CA Risk Assessment (FHS-7)   Do you have a family history of breast, colon, or ovarian cancer? Yes No / Unknown No / Unknown         Mammogram Screening - Mammogram every 1-2 years updated in Health Maintenance based on mutual  "decision making  Pertinent mammograms are reviewed under the imaging tab.  Review of Systems     Review of Systems  CONSTITUTIONAL: NEGATIVE for fever, chills, change in weight  INTEGUMENTARY/SKIN: NEGATIVE for worrisome rashes, moles or lesions  EYES: NEGATIVE for vision changes or irritation  ENT/MOUTH: NEGATIVE for ear, mouth and throat problems  RESP: NEGATIVE for significant cough or SOB  BREAST: NEGATIVE for masses, tenderness or discharge  CV: NEGATIVE for chest pain, palpitations or peripheral edema  GI: NEGATIVE for nausea, abdominal pain, heartburn, or change in bowel habits  : NEGATIVE for frequency, dysuria, or hematuria  MUSCULOSKELETAL: NEGATIVE for significant arthralgias or myalgia  NEURO: NEGATIVE for weakness, dizziness or paresthesias  ENDOCRINE: NEGATIVE for temperature intolerance, skin/hair changes  HEME: NEGATIVE for bleeding problems  PSYCHIATRIC: NEGATIVE for changes in mood or affect    OBJECTIVE:   BP 98/80   Pulse 67   Temp 96.9  F (36.1  C)   Ht 1.486 m (4' 10.5\")   Wt 67.6 kg (149 lb)   SpO2 96%   BMI 30.61 kg/m     Estimated body mass index is 30.61 kg/m  as calculated from the following:    Height as of this encounter: 1.486 m (4' 10.5\").    Weight as of this encounter: 67.6 kg (149 lb).  Physical Exam  GENERAL: alert and no distress  EYES: Eyes grossly normal to inspection, PERRL and conjunctivae and sclerae normal  HENT: ear canals and TM's normal, nose and mouth without ulcers or lesions  NECK: no adenopathy, no asymmetry, masses, or scars  RESP: lungs clear to auscultation - no rales, rhonchi or wheezes  CV: regular rate and rhythm, normal S1 S2, no S3 or S4, no murmur, click or rub, no peripheral edema  ABDOMEN: soft, nontender, no hepatosplenomegaly, no masses and bowel sounds normal  MS: no gross musculoskeletal defects noted, no edema  SKIN: no suspicious lesions or rashes  NEURO: Normal strength and tone, mentation intact and speech normal  PSYCH: mentation appears " "normal, affect normal/bright    Diagnostic Test Results:  Labs reviewed in Epic    ASSESSMENT / PLAN:   Need for prophylactic vaccination against hepatitis B virus  - hepatitis b vaccine recombinant (RECOMBIVAX-HB) 10 MCG/ML injection  Dispense: 1 mL; Refill: 0    Cervical cancer screening  Pt declines today, will schedule     Encounter for Medicare annual wellness exam            Counseling  Reviewed preventive health counseling, as reflected in patient instructions      BMI  Estimated body mass index is 30.61 kg/m  as calculated from the following:    Height as of this encounter: 1.486 m (4' 10.5\").    Weight as of this encounter: 67.6 kg (149 lb).   Weight management plan: Discussed healthy diet and exercise guidelines      She reports that she has never smoked. She has never been exposed to tobacco smoke. She has never used smokeless tobacco.      Appropriate preventive services were discussed with this patient, including applicable screening as appropriate for fall prevention, nutrition, physical activity, Tobacco-use cessation, weight loss and cognition.  Checklist reviewing preventive services available has been given to the patient.    Reviewed patients plan of care and provided an AVS. The Complex Care Plan (for patients with higher acuity and needing more deliberate coordination of services) for Chioma meets the Care Plan requirement. This Care Plan has been established and reviewed with the Patient and caregiver.          Signed Electronically by: Akilah Navarro PA-C    Identified Health Risks  I have reviewed Opioid Use Disorder and Substance Use Disorder risk factors and made any needed referrals.   "

## 2024-04-19 NOTE — PATIENT INSTRUCTIONS
Preventive Care Advice   This is general advice given by our system to help you stay healthy. However, your care team may have specific advice just for you. Please talk to your care team about your preventive care needs.  Nutrition  Eat 5 or more servings of fruits and vegetables each day.  Try wheat bread, brown rice and whole grain pasta (instead of white bread, rice, and pasta).  Get enough calcium and vitamin D. Check the label on foods and aim for 100% of the RDA (recommended daily allowance).  Lifestyle  Exercise at least 150 minutes each week   (30 minutes a day, 5 days a week).  Do muscle strengthening activities 2 days a week. These help control your weight and prevent disease.  No smoking.  Wear sunscreen to prevent skin cancer.  Have a dental exam and cleaning every 6 months.  Yearly exams  See your health care team every year to talk about:  Any changes in your health.  Any medicines your care team has prescribed.  Preventive care, family planning, and ways to prevent chronic diseases.  Shots (vaccines)   HPV shots (up to age 26), if you've never had them before.  Hepatitis B shots (up to age 59), if you've never had them before.  COVID-19 shot: Get this shot when it's due.  Flu shot: Get a flu shot every year.  Tetanus shot: Get a tetanus shot every 10 years.  Pneumococcal, hepatitis A, and RSV shots: Ask your care team if you need these based on your risk.  Shingles shot (for age 50 and up).  General health tests  Diabetes screening:  Starting at age 35, Get screened for diabetes at least every 3 years.  If you are younger than age 35, ask your care team if you should be screened for diabetes.  Cholesterol test: At age 39, start having a cholesterol test every 5 years, or more often if advised.  Bone density scan (DEXA): At age 50, ask your care team if you should have this scan for osteoporosis (brittle bones).  Hepatitis C: Get tested at least once in your life.  STIs (sexually transmitted  infections)  Before age 24: Ask your care team if you should be screened for STIs.  After age 24: Get screened for STIs if you're at risk. You are at risk for STIs (including HIV) if:  You are sexually active with more than one person.  You don't use condoms every time.  You or a partner was diagnosed with a sexually transmitted infection.  If you are at risk for HIV, ask about PrEP medicine to prevent HIV.  Get tested for HIV at least once in your life, whether you are at risk for HIV or not.  Cancer screening tests  Cervical cancer screening: If you have a cervix, begin getting regular cervical cancer screening tests at age 21. Most people who have regular screenings with normal results can stop after age 65. Talk about this with your provider.  Breast cancer scan (mammogram): If you've ever had breasts, begin having regular mammograms starting at age 40. This is a scan to check for breast cancer.  Colon cancer screening: It is important to start screening for colon cancer at age 45.  Have a colonoscopy test every 10 years (or more often if you're at risk) Or, ask your provider about stool tests like a FIT test every year or Cologuard test every 3 years.  To learn more about your testing options, visit: https://www.[employer]/461247.pdf.  For help making a decision, visit: https://bit.ly/xv35629.  Prostate cancer screening test: If you have a prostate and are age 55 to 69, ask your provider if you would benefit from a yearly prostate cancer screening test.  Lung cancer screening: If you are a current or former smoker age 50 to 80, ask your care team if ongoing lung cancer screenings are right for you.  For informational purposes only. Not to replace the advice of your health care provider. Copyright   2023 [employer]. All rights reserved. Clinically reviewed by the [facility] Transitions Program. Cirqle 978823 - REV 01/24.    Learning About Activities of Daily Living  What are activities  of daily living?     Activities of daily living (ADLs) are the basic self-care tasks you do every day. These include eating, bathing, dressing, and moving around.  As you age, and if you have health problems, you may find that it's harder to do some of these tasks. If so, your doctor can suggest ideas that may help.  To measure what kind of help you may need, your doctor will ask how well you are able to do ADLs. Let your doctor know if there are any tasks that you are having trouble doing. This is an important first step to getting help. And when you have the help you need, you can stay as independent as possible.  How will a doctor assess your ADLs?  Asking about ADLs is part of a routine health checkup your doctor will likely do as you age. Your health check might be done in a doctor's office, in your home, or at a hospital. The goal is to find out if you are having any problems that could make it hard to care for yourself or that make it unsafe for you to be on your own.  To measure your ADLs, your doctor will ask how hard it is for you to do routine tasks. Your doctor may also want to know if you have changed the way you do a task because of a health problem. Your doctor may watch how you:  Walk back and forth.  Keep your balance while you stand or walk.  Move from sitting to standing or from a bed to a chair.  Button or unbutton a shirt or sweater.  Remove and put on your shoes.  It's common to feel a little worried or anxious if you find you can't do all the things you used to be able to do. Talking with your doctor about ADLs is a way to make sure you're as safe as possible and able to care for yourself as well as you can. You may want to bring a caregiver, friend, or family member to your checkup. They can help you talk to your doctor.  Follow-up care is a key part of your treatment and safety. Be sure to make and go to all appointments, and call your doctor if you are having problems. It's also a good idea  to know your test results and keep a list of the medicines you take.  Current as of: October 24, 2023               Content Version: 14.0    8394-9606 [employer].   Care instructions adapted under license by your healthcare professional. If you have questions about a medical condition or this instruction, always ask your healthcare professional. [employer] disclaims any warranty or liability for your use of this information.

## 2024-05-27 DIAGNOSIS — E78.5 DYSLIPIDEMIA: ICD-10-CM

## 2024-05-28 RX ORDER — SIMVASTATIN 10 MG
10 TABLET ORAL AT BEDTIME
Qty: 90 TABLET | Refills: 1 | Status: SHIPPED | OUTPATIENT
Start: 2024-05-28 | End: 2024-08-27

## 2024-05-28 NOTE — TELEPHONE ENCOUNTER
According to pharmacy documentation, pt last had this filled on 2/14/24, so she is due for another refill.    Routing to provider to review and send Rx.    Yesenia Lin RN, BSN  [facility]

## 2024-05-28 NOTE — TELEPHONE ENCOUNTER
.Sending to Triage RN.  to confirm reason for gap since last refill then send to pcp.  Andreea JOHNSNON, RN

## 2024-06-05 ENCOUNTER — LAB (OUTPATIENT)
Dept: LAB | Facility: CLINIC | Age: 49
End: 2024-06-05
Payer: MEDICARE

## 2024-06-05 DIAGNOSIS — E11.9 TYPE 2 DIABETES MELLITUS WITHOUT COMPLICATION, WITHOUT LONG-TERM CURRENT USE OF INSULIN (H): ICD-10-CM

## 2024-06-05 DIAGNOSIS — E11.9 DM (DIABETES MELLITUS) (H): Primary | ICD-10-CM

## 2024-06-05 LAB — HBA1C MFR BLD: 8.6 % (ref 0–5.6)

## 2024-06-05 PROCEDURE — 82043 UR ALBUMIN QUANTITATIVE: CPT

## 2024-06-05 PROCEDURE — 82570 ASSAY OF URINE CREATININE: CPT

## 2024-06-05 PROCEDURE — 36415 COLL VENOUS BLD VENIPUNCTURE: CPT

## 2024-06-05 PROCEDURE — 83036 HEMOGLOBIN GLYCOSYLATED A1C: CPT

## 2024-06-06 LAB
CREAT UR-MCNC: 84.2 MG/DL
MICROALBUMIN UR-MCNC: 20.6 MG/L
MICROALBUMIN/CREAT UR: 24.47 MG/G CR (ref 0–25)

## 2024-06-07 ENCOUNTER — VIRTUAL VISIT (OUTPATIENT)
Dept: FAMILY MEDICINE | Facility: CLINIC | Age: 49
End: 2024-06-07
Payer: MEDICARE

## 2024-06-07 DIAGNOSIS — E11.9 TYPE 2 DIABETES MELLITUS WITHOUT COMPLICATION, WITHOUT LONG-TERM CURRENT USE OF INSULIN (H): ICD-10-CM

## 2024-06-07 PROCEDURE — G2211 COMPLEX E/M VISIT ADD ON: HCPCS | Mod: 95 | Performed by: PHYSICIAN ASSISTANT

## 2024-06-07 PROCEDURE — 99214 OFFICE O/P EST MOD 30 MIN: CPT | Mod: 95 | Performed by: PHYSICIAN ASSISTANT

## 2024-06-07 NOTE — PROGRESS NOTES
Chioma is a 49 year old who is being evaluated via a billable video visit.    How would you like to obtain your AVS? MyChart  If the video visit is dropped, the invitation should be resent by: Text to cell phone: [phone]  Will anyone else be joining your video visit? No      Assessment & Plan     Type 2 diabetes mellitus without complication, without long-term current use of insulin (H)  Not well-controlled will increase metformin to 1000 mg twice daily was previously at 500 mg in the morning and 1000 mg in the evening and tolerates that well  Will also add Jardiance.  Discussed dietary changes Chioma notes it is really hard to avoid candy and sweets when she is around them we did discuss either ordering Philrealest[employer]'s only once a week when she is at work or if that is too difficult ordering chicken nuggets with apples and a diet soda  Follow-up 3 months to recheck  - metFORMIN (GLUCOPHAGE) 1000 MG tablet  Dispense: 180 tablet; Refill: 1  - empagliflozin (JARDIANCE) 10 MG TABS tablet  Dispense: 90 tablet; Refill: 1  - Hemoglobin A1c          Subjective   Chioma is a 49 year old, presenting for the following health issues:  No chief complaint on file.      Video Start Time: 9:34AM    HPI     Walks the dog daily for 10 minutes daily.  She is hoping to exercise more as well    She works at [employer] and orders chicken nugets with small French fries and a diet pop at least once a day when she is at work.  She does pack lunch but she typically eats that on break and then has lunch at lunchtime as well.      Review of Systems  Constitutional, HEENT, cardiovascular, pulmonary, gi and gu systems are negative, except as otherwise noted.      Objective           Vitals:  No vitals were obtained today due to virtual visit.    Physical Exam   GENERAL: alert and no distress  EYES: Eyes grossly normal to inspection.  No discharge or erythema, or obvious scleral/conjunctival abnormalities.  RESP: No audible wheeze, cough,  or visible cyanosis.    SKIN: Visible skin clear. No significant rash, abnormal pigmentation or lesions.  NEURO: Cranial nerves grossly intact.  Mentation and speech appropriate for age.  PSYCH: Appropriate affect, tone, and pace of words          Video-Visit Details    Type of service:  Video Visit   Video End Time:9:56 AM  Originating Location (pt. Location): Home    Distant Location (provider location):  On-site  Platform used for Video Visit: Alvarez  Signed Electronically by: Akilah Navarro PA-C

## 2024-06-23 DIAGNOSIS — Z00.00 HEALTHCARE MAINTENANCE: ICD-10-CM

## 2024-06-24 RX ORDER — CALCIUM CARBONATE/VITAMIN D3 500MG-5MCG
1 TABLET ORAL 2 TIMES DAILY WITH MEALS
Qty: 60 TABLET | Refills: 11 | Status: SHIPPED | OUTPATIENT
Start: 2024-06-24

## 2024-07-18 ENCOUNTER — OFFICE VISIT (OUTPATIENT)
Dept: FAMILY MEDICINE | Facility: CLINIC | Age: 49
End: 2024-07-18
Payer: MEDICARE

## 2024-07-18 ENCOUNTER — TELEPHONE (OUTPATIENT)
Dept: FAMILY MEDICINE | Facility: CLINIC | Age: 49
End: 2024-07-18

## 2024-07-18 VITALS
OXYGEN SATURATION: 97 % | WEIGHT: 143.3 LBS | BODY MASS INDEX: 30.08 KG/M2 | RESPIRATION RATE: 17 BRPM | TEMPERATURE: 98 F | DIASTOLIC BLOOD PRESSURE: 75 MMHG | HEART RATE: 64 BPM | SYSTOLIC BLOOD PRESSURE: 111 MMHG | HEIGHT: 58 IN

## 2024-07-18 DIAGNOSIS — H65.91 OME (OTITIS MEDIA WITH EFFUSION), RIGHT: ICD-10-CM

## 2024-07-18 DIAGNOSIS — N89.8 VAGINAL ODOR: Primary | ICD-10-CM

## 2024-07-18 LAB
ALBUMIN UR-MCNC: NEGATIVE MG/DL
APPEARANCE UR: ABNORMAL
BACTERIA #/AREA URNS HPF: ABNORMAL /HPF
BILIRUB UR QL STRIP: NEGATIVE
CLUE CELLS: NORMAL
COLOR UR AUTO: YELLOW
GLUCOSE UR STRIP-MCNC: >=1000 MG/DL
HGB UR QL STRIP: NEGATIVE
KETONES UR STRIP-MCNC: NEGATIVE MG/DL
LEUKOCYTE ESTERASE UR QL STRIP: NEGATIVE
NITRATE UR QL: POSITIVE
PH UR STRIP: 5.5 [PH] (ref 5–8)
RBC #/AREA URNS AUTO: ABNORMAL /HPF
SP GR UR STRIP: 1.02 (ref 1–1.03)
SQUAMOUS #/AREA URNS AUTO: ABNORMAL /LPF
TRICHOMONAS, WET PREP: NORMAL
UROBILINOGEN UR STRIP-ACNC: 0.2 E.U./DL
WBC #/AREA URNS AUTO: ABNORMAL /HPF
WBC'S/HIGH POWER FIELD, WET PREP: NORMAL
YEAST, WET PREP: NORMAL

## 2024-07-18 PROCEDURE — 81001 URINALYSIS AUTO W/SCOPE: CPT | Performed by: NURSE PRACTITIONER

## 2024-07-18 PROCEDURE — 99213 OFFICE O/P EST LOW 20 MIN: CPT | Performed by: NURSE PRACTITIONER

## 2024-07-18 PROCEDURE — 87086 URINE CULTURE/COLONY COUNT: CPT | Mod: GZ | Performed by: NURSE PRACTITIONER

## 2024-07-18 PROCEDURE — 87210 SMEAR WET MOUNT SALINE/INK: CPT | Performed by: NURSE PRACTITIONER

## 2024-07-18 PROCEDURE — 87186 SC STD MICRODIL/AGAR DIL: CPT | Performed by: NURSE PRACTITIONER

## 2024-07-18 NOTE — TELEPHONE ENCOUNTER
Attempted to contact, no answer.    [facility]      ----- Message from RADHA PATEL sent at 7/18/2024  9:03 AM CDT -----  Please call patient.    Let them know there is no sign of vaginal infection.    There is a possible urinary tract infection-but I would like to wait until the results of the culture are back (should have preliminary results tomorrow) before starting an antibiotic. I think it is possibly a contaminated sample with skin cells causing there to be some bacteria.

## 2024-07-18 NOTE — PROGRESS NOTES
"  Assessment & Plan     1. Vaginal odor  Awaiting urine culture. I do suspect contaminated sample based on UA so will await treating for now.    Glucose seen in urine-discussed this was expected with jardiance    Wet prep negative  - UA Macroscopic with reflex to Microscopic and Culture - Clinic Collect  - Wet prep - Clinic Collect  - Urine Microscopic Exam  - Urine Culture    2. OME (otitis media with effusion), right  Discussed saline to help clear sinus passages . If not improving or worsening could refer to ENT to assess further.               Roseanna Bedolla is a 49 year old, presenting for the following health issues:  Ear Problem (right ear plugged. New vaginal odor. )        7/18/2024     7:58 AM   Additional Questions   Roomed by Krysta BELTRAN   Accompanied by adult foster care provider     History of Present Illness       Reason for visit:  I dont know    She eats 0-1 servings of fruits and vegetables daily.She consumes 1 sweetened beverage(s) daily.She exercises with enough effort to increase her heart rate 20 to 29 minutes per day.  She exercises with enough effort to increase her heart rate 7 days per week.   She is taking medications regularly.     Recently started jardiance and worker has noticed increase in vaginal smell.    Patient also reports right ear feeling plugged. Does use q-tips to clean ear.                     Objective    /75 (BP Location: Right arm, Patient Position: Sitting, Cuff Size: Adult Regular)   Pulse 64   Temp 98  F (36.7  C) (Oral)   Resp 17   Ht 1.473 m (4' 10\")   Wt 65 kg (143 lb 4.8 oz)   SpO2 97%   BMI 29.95 kg/m    Body mass index is 29.95 kg/m .  Physical Exam  Constitutional:       Appearance: Normal appearance.   HENT:      Right Ear: No tenderness. A middle ear effusion is present. There is no impacted cerumen. Tympanic membrane is not erythematous or bulging.   Genitourinary:     Comments: Deferred-self swab completed.   Neurological:      General: No " focal deficit present.      Mental Status: She is alert and oriented to person, place, and time.   Psychiatric:         Mood and Affect: Mood normal.         Behavior: Behavior normal.            Results for orders placed or performed in visit on 07/18/24   UA Macroscopic with reflex to Microscopic and Culture - Clinic Collect     Status: Abnormal    Specimen: Urine, Midstream   Result Value Ref Range    Color Urine Yellow Colorless, Straw, Light Yellow, Yellow    Appearance Urine Slightly Cloudy (A) Clear    Glucose Urine >=1000 (A) Negative mg/dL    Bilirubin Urine Negative Negative    Ketones Urine Negative Negative mg/dL    Specific Gravity Urine 1.020 1.005 - 1.030    Blood Urine Negative Negative    pH Urine 5.5 5.0 - 8.0    Protein Albumin Urine Negative Negative mg/dL    Urobilinogen Urine 0.2 0.2, 1.0 E.U./dL    Nitrite Urine Positive (A) Negative    Leukocyte Esterase Urine Negative Negative   Urine Microscopic Exam     Status: Abnormal   Result Value Ref Range    Bacteria Urine Many (A) None Seen /HPF    RBC Urine None Seen 0-2 /HPF /HPF    WBC Urine 5-10 (A) 0-5 /HPF /HPF    Squamous Epithelials Urine Many (A) None Seen /LPF   Wet prep - Clinic Collect     Status: Normal    Specimen: Vagina; Swab   Result Value Ref Range    Trichomonas Absent Absent    Yeast Absent Absent    Clue Cells Absent Absent    WBCs/high power field None None           Signed Electronically by: DINA MOODY CNP

## 2024-07-19 DIAGNOSIS — N30.00 ACUTE CYSTITIS WITHOUT HEMATURIA: Primary | ICD-10-CM

## 2024-07-19 RX ORDER — NITROFURANTOIN 25; 75 MG/1; MG/1
100 CAPSULE ORAL 2 TIMES DAILY
Qty: 10 CAPSULE | Refills: 0 | Status: SHIPPED | OUTPATIENT
Start: 2024-07-19 | End: 2024-07-24

## 2024-07-20 ENCOUNTER — TELEPHONE (OUTPATIENT)
Dept: FAMILY MEDICINE | Facility: CLINIC | Age: 49
End: 2024-07-20
Payer: MEDICARE

## 2024-07-20 DIAGNOSIS — N30.00 ACUTE CYSTITIS WITHOUT HEMATURIA: Primary | ICD-10-CM

## 2024-07-20 LAB — BACTERIA UR CULT: ABNORMAL

## 2024-07-20 RX ORDER — CEPHALEXIN 500 MG/1
500 CAPSULE ORAL 2 TIMES DAILY
Qty: 14 CAPSULE | Refills: 0 | Status: SHIPPED | OUTPATIENT
Start: 2024-07-20 | End: 2024-07-27

## 2024-08-25 DIAGNOSIS — E78.5 DYSLIPIDEMIA: ICD-10-CM

## 2024-08-26 RX ORDER — SIMVASTATIN 10 MG
10 TABLET ORAL AT BEDTIME
Qty: 90 TABLET | Refills: 1 | OUTPATIENT
Start: 2024-08-26

## 2024-08-27 ENCOUNTER — NURSE TRIAGE (OUTPATIENT)
Dept: NURSING | Facility: CLINIC | Age: 49
End: 2024-08-27

## 2024-08-27 ENCOUNTER — E-VISIT (OUTPATIENT)
Dept: FAMILY MEDICINE | Facility: CLINIC | Age: 49
End: 2024-08-27
Payer: MEDICARE

## 2024-08-27 DIAGNOSIS — R30.0 DYSURIA: Primary | ICD-10-CM

## 2024-08-27 DIAGNOSIS — E78.5 DYSLIPIDEMIA: ICD-10-CM

## 2024-08-27 DIAGNOSIS — N30.00 ACUTE CYSTITIS WITHOUT HEMATURIA: ICD-10-CM

## 2024-08-27 PROCEDURE — 99421 OL DIG E/M SVC 5-10 MIN: CPT | Performed by: PHYSICIAN ASSISTANT

## 2024-08-27 RX ORDER — SIMVASTATIN 10 MG
10 TABLET ORAL AT BEDTIME
Qty: 90 TABLET | Refills: 2 | Status: SHIPPED | OUTPATIENT
Start: 2024-08-27

## 2024-08-27 NOTE — TELEPHONE ENCOUNTER
Please have pt do evisit and then I can put in an order for ua and treat if needed    Akilah Navarro PA-C

## 2024-08-27 NOTE — TELEPHONE ENCOUNTER
Provider Recommendation Follow Up:   Reached patient/caregiver. Informed of provider's recommendations. Patient verbalized understanding and agrees with the plan.     Luz Elena Lee RN

## 2024-08-27 NOTE — TELEPHONE ENCOUNTER
Nurse Triage SBAR    Is this a 2nd Level Triage? YES, LICENSED PRACTITIONER REVIEW IS REQUIRED    Situation: Urinary Symptoms.     Background:  Patient's caregiver, Evelyn, called and writer confirmed there is consent to communicate for this individual. Evelyn reports the patient has had this same symptom with a previous episode of urinary tract infection.    Assessment: Patient's caregiver, Evelyn, reports the only notable symptom is bad smelling urine, denies any other urinary symptoms, and her temperature is 95.7 degrees.    Protocol Recommended Disposition:   See in Office Today    Recommendation: Writer discussed that an Urgent care could handle the concern for UTI symptoms but caller and patient do not want to go to urgent care at this time. Evelyn was given care advice and call back information.     Routed to provider    Does the patient meet one of the following criteria for ADS visit consideration? 16+ years old, with an [facility] PCP     TIP  Providers, please consider if this condition is appropriate for management at one of our Acute and Diagnostic Services sites.     If patient is a good candidate, please use dotphrase <dot>triageresponse and select Refer to ADS to document.      Reason for Disposition   Bad or foul-smelling urine    Additional Information   Negative: Shock suspected (e.g., cold/pale/clammy skin, too weak to stand, low BP, rapid pulse)   Negative: Sounds like a life-threatening emergency to the triager   Negative: Unable to urinate (or only a few drops) > 4 hours and bladder feels very full (e.g., palpable bladder or strong urge to urinate)   Negative: Decreased urination and drinking very little and dehydration suspected (e.g., dark urine, no urine > 12 hours, very dry mouth, very lightheaded)   Negative: Patient sounds very sick or weak to the triager   Negative: Fever > 100.4 F  (38.0 C)   Negative: Side (flank) or lower back pain present    Protocols used: Urinary Symptoms-A-[place]

## 2024-08-27 NOTE — PATIENT INSTRUCTIONS
Dear Chioma Mccormick,     After reviewing your responses, I would like you to come in for a urine test to make sure we treat you correctly. This urine test is to evaluate you for a possible urinary tract infection, and should be scheduled for today or tomorrow. Schedule a Lab Only appointment here.     Lab appointments are not available at most locations on the weekends. If no Lab Only appointment is available, you should be seen in any of our convenient Walk-in or Urgent Care Centers, which can be found on our website here.     You will receive instructions with your results in [place] once they are available.     If your symptoms worsen, you develop pain in your back or stomach, develop fevers, or are not improving in 5 days, please contact your primary care provider for an appointment or visit a Walk-in or Urgent Care Center to be seen.     Thanks again for choosing us as your health care partner,     Akilah Navarro PA-C

## 2024-08-28 ENCOUNTER — LAB (OUTPATIENT)
Dept: LAB | Facility: CLINIC | Age: 49
End: 2024-08-28
Payer: MEDICARE

## 2024-08-28 DIAGNOSIS — R30.0 DYSURIA: ICD-10-CM

## 2024-08-28 LAB
ALBUMIN UR-MCNC: NEGATIVE MG/DL
APPEARANCE UR: CLEAR
BACTERIA #/AREA URNS HPF: ABNORMAL /HPF
BILIRUB UR QL STRIP: NEGATIVE
COLOR UR AUTO: YELLOW
GLUCOSE UR STRIP-MCNC: >=1000 MG/DL
HGB UR QL STRIP: NEGATIVE
KETONES UR STRIP-MCNC: NEGATIVE MG/DL
LEUKOCYTE ESTERASE UR QL STRIP: ABNORMAL
NITRATE UR QL: POSITIVE
PH UR STRIP: 5.5 [PH] (ref 5–8)
RBC #/AREA URNS AUTO: ABNORMAL /HPF
SP GR UR STRIP: 1.02 (ref 1–1.03)
SQUAMOUS #/AREA URNS AUTO: ABNORMAL /LPF
UROBILINOGEN UR STRIP-ACNC: 0.2 E.U./DL
WBC #/AREA URNS AUTO: ABNORMAL /HPF

## 2024-08-28 PROCEDURE — 87186 SC STD MICRODIL/AGAR DIL: CPT

## 2024-08-28 PROCEDURE — 81001 URINALYSIS AUTO W/SCOPE: CPT

## 2024-08-28 PROCEDURE — 87086 URINE CULTURE/COLONY COUNT: CPT

## 2024-08-28 RX ORDER — CEPHALEXIN 500 MG/1
500 CAPSULE ORAL 2 TIMES DAILY
Qty: 14 CAPSULE | Refills: 0 | Status: SHIPPED | OUTPATIENT
Start: 2024-08-28 | End: 2024-09-04

## 2024-08-29 LAB — BACTERIA UR CULT: ABNORMAL

## 2024-09-03 ENCOUNTER — LAB (OUTPATIENT)
Dept: LAB | Facility: CLINIC | Age: 49
End: 2024-09-03
Payer: MEDICARE

## 2024-09-03 DIAGNOSIS — E11.9 TYPE 2 DIABETES MELLITUS WITHOUT COMPLICATION, WITHOUT LONG-TERM CURRENT USE OF INSULIN (H): ICD-10-CM

## 2024-09-03 LAB — HBA1C MFR BLD: 6.9 % (ref 0–5.6)

## 2024-09-03 PROCEDURE — 83036 HEMOGLOBIN GLYCOSYLATED A1C: CPT

## 2024-09-03 PROCEDURE — 36415 COLL VENOUS BLD VENIPUNCTURE: CPT

## 2024-09-06 ENCOUNTER — VIRTUAL VISIT (OUTPATIENT)
Dept: FAMILY MEDICINE | Facility: CLINIC | Age: 49
End: 2024-09-06
Payer: MEDICARE

## 2024-09-06 DIAGNOSIS — E11.9 TYPE 2 DIABETES MELLITUS WITHOUT COMPLICATION, WITHOUT LONG-TERM CURRENT USE OF INSULIN (H): Primary | ICD-10-CM

## 2024-09-06 DIAGNOSIS — Z87.440 PERSONAL HISTORY OF URINARY TRACT INFECTION: ICD-10-CM

## 2024-09-06 PROCEDURE — G2211 COMPLEX E/M VISIT ADD ON: HCPCS | Mod: 95 | Performed by: PHYSICIAN ASSISTANT

## 2024-09-06 PROCEDURE — 99214 OFFICE O/P EST MOD 30 MIN: CPT | Mod: 95 | Performed by: PHYSICIAN ASSISTANT

## 2024-09-06 RX ORDER — BLOOD-GLUCOSE SENSOR
EACH MISCELLANEOUS
Qty: 6 EACH | Refills: 1 | Status: SHIPPED | OUTPATIENT
Start: 2024-09-06

## 2024-09-06 NOTE — PROGRESS NOTES
Chioma is a 49 year old who is being evaluated via a billable video visit.    How would you like to obtain your AVS? MyChart  If the video visit is dropped, the invitation should be resent by: Text to cell phone: [phone]  Will anyone else be joining your video visit? No      Assessment & Plan     Type 2 diabetes mellitus without complication, without long-term current use of insulin (H)  Stop jardiance due to side effects of utis  Begin januvia  Discussed ozempic, she would prefer not to take in injection but will think about it.   Will send in cgm as well  - sitagliptin (ROSALEEUVIA) 100 MG tablet  Dispense: 90 tablet; Refill: 3  - Continuous Glucose Sensor (FREESTYLE LEANDRA 3 SENSOR) MISC  Dispense: 6 each; Refill: 1  - Hemoglobin A1c    Personal history of urinary tract infection  Resolved, occurred on jardiance.  Discontinue jardiance      Follow up 3 moths    The longitudinal plan of care for the diagnosis(es)/condition(s) as documented were addressed during this visit. Due to the added complexity in care, I will continue to support Chioma in the subsequent management and with ongoing continuity of care.              Subjective   Chioma is a 49 year old, presenting for the following health issues:  No chief complaint on file.      Video Start Time: 10:51 AM    History of Present Illness       Reason for visit:  Follow up   She is taking medications regularly.     Diabetes- currently taking metformin 1000 bid and tolerating well.  She is taking jardiance which has lowered her blood sugars well  however has had 2 utis in the past month since being on jardiance.  Would like to change to an alternative medication and prefers no injections at this time.  She is checking her glucose once weekly, does not like to prick her fingers, interested in CGM.        Review of Systems  Constitutional, HEENT, cardiovascular, pulmonary, gi and gu systems are negative, except as otherwise noted.      Objective            Vitals:  No vitals were obtained today due to virtual visit.    Physical Exam   GENERAL: alert and no distress  EYES: Eyes grossly normal to inspection.  No discharge or erythema, or obvious scleral/conjunctival abnormalities.  RESP: No audible wheeze, cough, or visible cyanosis.    SKIN: Visible skin clear. No significant rash, abnormal pigmentation or lesions.  NEURO: Cranial nerves grossly intact.  Mentation and speech appropriate for age.  PSYCH: Appropriate affect, tone, and pace of words          Video-Visit Details    Type of service:  Video Visit   Video End Time:11:12 AM  Originating Location (pt. Location): Home    Distant Location (provider location):  On-site  Platform used for Video Visit: Alvarez  Signed Electronically by: Akilah Navarro PA-C

## 2024-09-06 NOTE — PATIENT INSTRUCTIONS
Please stop jardiance because it was giving you bladder infections.      Please start januvia.  You can start it today if the pharmacy has it available before you leave.  Otherwise ok to start it on 9/8/2024 after you return from [place].

## 2024-10-05 DIAGNOSIS — E11.9 TYPE 2 DIABETES MELLITUS WITHOUT COMPLICATION, WITHOUT LONG-TERM CURRENT USE OF INSULIN (H): ICD-10-CM

## 2024-11-20 ENCOUNTER — TELEPHONE (OUTPATIENT)
Dept: FAMILY MEDICINE | Facility: CLINIC | Age: 49
End: 2024-11-20
Payer: MEDICARE

## 2024-11-20 NOTE — TELEPHONE ENCOUNTER
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 Patient    Where did/will the form come from? Patient or family brought in       When is form/letter needed by: ASAP    How would you like the form/letter returned:     Patient Notified form requests are processed in 5-7 business days:Yes    Could we send this information to you in [place] or would you prefer to receive a phone call?:   Patient would prefer a phone call   Okay to leave a detailed message?: Yes at Cell number on file:    Telephone Information:   Mobile [phone]

## 2024-11-21 NOTE — TELEPHONE ENCOUNTER
Pt and her care giver were informed. Form is ready to be picked up, Evelyn Pt's caregiver is come to get it.

## 2024-11-21 NOTE — TELEPHONE ENCOUNTER
Mobility Form is currently located al Wally 's desk.  Please update this encounter accordingly.      Rhea DONOVAN

## 2024-12-02 ENCOUNTER — LAB (OUTPATIENT)
Dept: LAB | Facility: CLINIC | Age: 49
End: 2024-12-02
Payer: MEDICARE

## 2024-12-02 DIAGNOSIS — E11.9 DM (DIABETES MELLITUS) (H): Primary | ICD-10-CM

## 2024-12-02 DIAGNOSIS — E11.9 TYPE 2 DIABETES MELLITUS WITHOUT COMPLICATION, WITHOUT LONG-TERM CURRENT USE OF INSULIN (H): ICD-10-CM

## 2024-12-02 LAB
CHOLEST SERPL-MCNC: 106 MG/DL
EST. AVERAGE GLUCOSE BLD GHB EST-MCNC: 131 MG/DL
FASTING STATUS PATIENT QL REPORTED: YES
HBA1C MFR BLD: 6.2 % (ref 0–5.6)
HDLC SERPL-MCNC: 32 MG/DL
LDLC SERPL CALC-MCNC: 52 MG/DL
NONHDLC SERPL-MCNC: 74 MG/DL
TRIGL SERPL-MCNC: 109 MG/DL

## 2024-12-02 PROCEDURE — 83036 HEMOGLOBIN GLYCOSYLATED A1C: CPT

## 2024-12-02 PROCEDURE — 80061 LIPID PANEL: CPT

## 2024-12-02 PROCEDURE — 36415 COLL VENOUS BLD VENIPUNCTURE: CPT

## 2024-12-30 ENCOUNTER — MYC MEDICAL ADVICE (OUTPATIENT)
Dept: FAMILY MEDICINE | Facility: CLINIC | Age: 49
End: 2024-12-30
Payer: MEDICARE

## 2024-12-30 DIAGNOSIS — Z20.828 EXPOSURE TO INFLUENZA: Primary | ICD-10-CM

## 2025-01-02 RX ORDER — OSELTAMIVIR PHOSPHATE 75 MG/1
75 CAPSULE ORAL DAILY
Qty: 7 CAPSULE | Refills: 0 | Status: SHIPPED | OUTPATIENT
Start: 2025-01-02 | End: 2025-01-09

## 2025-01-21 ENCOUNTER — TRANSFERRED RECORDS (OUTPATIENT)
Dept: HEALTH INFORMATION MANAGEMENT | Facility: CLINIC | Age: 50
End: 2025-01-21
Payer: MEDICARE

## 2025-02-03 ENCOUNTER — MEDICAL CORRESPONDENCE (OUTPATIENT)
Dept: HEALTH INFORMATION MANAGEMENT | Facility: CLINIC | Age: 50
End: 2025-02-03
Payer: MEDICARE

## 2025-02-05 ENCOUNTER — E-VISIT (OUTPATIENT)
Dept: FAMILY MEDICINE | Facility: CLINIC | Age: 50
End: 2025-02-05
Payer: MEDICARE

## 2025-02-05 ENCOUNTER — MYC MEDICAL ADVICE (OUTPATIENT)
Dept: FAMILY MEDICINE | Facility: CLINIC | Age: 50
End: 2025-02-05

## 2025-02-05 DIAGNOSIS — R30.0 DYSURIA: Primary | ICD-10-CM

## 2025-02-05 DIAGNOSIS — N30.00 ACUTE CYSTITIS WITHOUT HEMATURIA: ICD-10-CM

## 2025-02-05 DIAGNOSIS — E11.9 TYPE 2 DIABETES MELLITUS WITHOUT COMPLICATION, WITHOUT LONG-TERM CURRENT USE OF INSULIN (H): ICD-10-CM

## 2025-02-05 RX ORDER — CARVEDILOL 25 MG/1
TABLET, FILM COATED ORAL
Qty: 100 STRIP | Refills: 10 | Status: SHIPPED | OUTPATIENT
Start: 2025-02-05

## 2025-02-05 NOTE — TELEPHONE ENCOUNTER
2-5-25  There is a 2nd TE on this concern on the same day.  I Called Evelyn (pt's care giver) consent on file.  Advised to submit E-Visit for UTI symptoms & so LABS can be orderered  [place]

## 2025-02-06 LAB
ALBUMIN UR-MCNC: NEGATIVE MG/DL
APPEARANCE UR: ABNORMAL
BACTERIA #/AREA URNS HPF: ABNORMAL /HPF
BILIRUB UR QL STRIP: NEGATIVE
COLOR UR AUTO: YELLOW
GLUCOSE UR STRIP-MCNC: NEGATIVE MG/DL
HGB UR QL STRIP: NEGATIVE
KETONES UR STRIP-MCNC: NEGATIVE MG/DL
LEUKOCYTE ESTERASE UR QL STRIP: ABNORMAL
NITRATE UR QL: POSITIVE
PH UR STRIP: 6.5 [PH] (ref 5–8)
RBC #/AREA URNS AUTO: ABNORMAL /HPF
SP GR UR STRIP: 1.02 (ref 1–1.03)
UROBILINOGEN UR STRIP-ACNC: 0.2 E.U./DL
WBC #/AREA URNS AUTO: ABNORMAL /HPF

## 2025-02-06 RX ORDER — NITROFURANTOIN 25; 75 MG/1; MG/1
100 CAPSULE ORAL 2 TIMES DAILY
Qty: 14 CAPSULE | Refills: 0 | Status: SHIPPED | OUTPATIENT
Start: 2025-02-06 | End: 2025-02-13

## 2025-02-06 NOTE — PATIENT INSTRUCTIONS
Dear Chioma Mccormick,     After reviewing your responses, I would like you to come in for a urine test to make sure we treat you correctly. This urine test is to evaluate you for a possible urinary tract infection, and should be scheduled for today or tomorrow. Schedule a Lab Only appointment here.     Lab appointments are not available at most locations on the weekends. If no Lab Only appointment is available, you should be seen in any of our convenient Walk-in or Urgent Care Centers, which can be found on our website here.     You will receive instructions with your results in [facility] once they are available.     If your symptoms worsen, you develop pain in your back or stomach, develop fevers, or are not improving in 5 days, please contact your primary care provider for an appointment or visit a Walk-in or Urgent Care Center to be seen.     Thanks again for choosing us as your health care partner,     Akilah Navarro PA-C

## 2025-03-03 ENCOUNTER — E-VISIT (OUTPATIENT)
Dept: URGENT CARE | Facility: CLINIC | Age: 50
End: 2025-03-03
Payer: MEDICARE

## 2025-03-03 DIAGNOSIS — R82.90 ABNORMAL URINE ODOR: Primary | ICD-10-CM

## 2025-03-03 NOTE — PATIENT INSTRUCTIONS
Dear Chioma Mccormick,     After reviewing your responses, I would like you to come in for a urine test to make sure we treat you correctly. This urine test and vaginal swab are to evaluate you for a possible urinary tract infection and vaginal infection and should be scheduled for today or tomorrow. Schedule a Lab Only appointment here.  Or call [phone].    Lab appointments are not available at most locations on the weekends. If no Lab Only appointment is available, you should be seen in any of our convenient Walk-in or Urgent Care Centers, which can be found on our website here.     You will receive instructions with your results in [employer] once they are available.     If your symptoms worsen, you develop pain in your back or stomach, develop fevers, or are not improving in 5 days, please contact your primary care provider for an appointment or visit a Walk-in or Urgent Care Center to be seen.     Thanks again for choosing us as your health care partner,     Bea Kemp MD

## 2025-03-04 ENCOUNTER — LAB (OUTPATIENT)
Dept: LAB | Facility: CLINIC | Age: 50
End: 2025-03-04
Payer: MEDICARE

## 2025-03-04 DIAGNOSIS — N30.00 ACUTE CYSTITIS WITHOUT HEMATURIA: Primary | ICD-10-CM

## 2025-03-04 DIAGNOSIS — R82.90 ABNORMAL URINE ODOR: ICD-10-CM

## 2025-03-04 LAB
ALBUMIN UR-MCNC: NEGATIVE MG/DL
APPEARANCE UR: ABNORMAL
BACTERIA #/AREA URNS HPF: ABNORMAL /HPF
BILIRUB UR QL STRIP: NEGATIVE
CLUE CELLS: NORMAL
COLOR UR AUTO: YELLOW
GLUCOSE UR STRIP-MCNC: NEGATIVE MG/DL
HGB UR QL STRIP: NEGATIVE
KETONES UR STRIP-MCNC: NEGATIVE MG/DL
LEUKOCYTE ESTERASE UR QL STRIP: NEGATIVE
NITRATE UR QL: POSITIVE
PH UR STRIP: 7 [PH] (ref 5–8)
RBC #/AREA URNS AUTO: ABNORMAL /HPF
SP GR UR STRIP: 1.02 (ref 1–1.03)
TRICHOMONAS, WET PREP: NORMAL
UROBILINOGEN UR STRIP-ACNC: 0.2 E.U./DL
WBC #/AREA URNS AUTO: ABNORMAL /HPF
WBC'S/HIGH POWER FIELD, WET PREP: NORMAL
YEAST, WET PREP: NORMAL

## 2025-03-04 PROCEDURE — 87186 SC STD MICRODIL/AGAR DIL: CPT

## 2025-03-04 PROCEDURE — 87086 URINE CULTURE/COLONY COUNT: CPT

## 2025-03-04 PROCEDURE — 87210 SMEAR WET MOUNT SALINE/INK: CPT

## 2025-03-04 PROCEDURE — 81001 URINALYSIS AUTO W/SCOPE: CPT

## 2025-03-04 RX ORDER — NITROFURANTOIN 25; 75 MG/1; MG/1
100 CAPSULE ORAL 2 TIMES DAILY
Qty: 10 CAPSULE | Refills: 0 | Status: SHIPPED | OUTPATIENT
Start: 2025-03-04 | End: 2025-03-09

## 2025-03-05 LAB — BACTERIA UR CULT: ABNORMAL

## 2025-03-14 ENCOUNTER — APPOINTMENT (OUTPATIENT)
Dept: RADIOLOGY | Facility: CLINIC | Age: 50
End: 2025-03-14
Attending: EMERGENCY MEDICINE
Payer: MEDICARE

## 2025-03-14 ENCOUNTER — HOSPITAL ENCOUNTER (EMERGENCY)
Facility: CLINIC | Age: 50
Discharge: HOME | End: 2025-03-14
Attending: EMERGENCY MEDICINE | Admitting: EMERGENCY MEDICINE
Payer: MEDICARE

## 2025-03-14 ENCOUNTER — APPOINTMENT (OUTPATIENT)
Dept: CT IMAGING | Facility: CLINIC | Age: 50
End: 2025-03-14
Attending: EMERGENCY MEDICINE
Payer: MEDICARE

## 2025-03-14 VITALS
RESPIRATION RATE: 18 BRPM | WEIGHT: 150 LBS | OXYGEN SATURATION: 97 % | BODY MASS INDEX: 28.32 KG/M2 | HEIGHT: 61 IN | TEMPERATURE: 98.3 F | SYSTOLIC BLOOD PRESSURE: 127 MMHG | DIASTOLIC BLOOD PRESSURE: 60 MMHG | HEART RATE: 87 BPM
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
## 2025-03-14 DIAGNOSIS — M25.531 RIGHT WRIST PAIN: ICD-10-CM

## 2025-03-14 DIAGNOSIS — S00.83XA TRAUMATIC HEMATOMA OF FOREHEAD, INITIAL ENCOUNTER: ICD-10-CM

## 2025-03-14 PROCEDURE — 73110 X-RAY EXAM OF WRIST: CPT | Mod: RT

## 2025-03-14 PROCEDURE — 70486 CT MAXILLOFACIAL W/O DYE: CPT

## 2025-03-14 PROCEDURE — 99284 EMERGENCY DEPT VISIT MOD MDM: CPT | Mod: 25

## 2025-03-14 PROCEDURE — 70450 CT HEAD/BRAIN W/O DYE: CPT

## 2025-03-14 PROCEDURE — 250N000013 HC RX MED GY IP 250 OP 250 PS 637: Performed by: EMERGENCY MEDICINE

## 2025-03-14 RX ORDER — ACETAMINOPHEN 325 MG/1
975 TABLET ORAL ONCE
Status: COMPLETED | OUTPATIENT
Start: 2025-03-14 | End: 2025-03-14

## 2025-03-14 RX ADMIN — ACETAMINOPHEN 975 MG: 325 TABLET ORAL at 09:55

## 2025-03-14 ASSESSMENT — COLUMBIA-SUICIDE SEVERITY RATING SCALE - C-SSRS
6. HAVE YOU EVER DONE ANYTHING, STARTED TO DO ANYTHING, OR PREPARED TO DO ANYTHING TO END YOUR LIFE?: NO
2. HAVE YOU ACTUALLY HAD ANY THOUGHTS OF KILLING YOURSELF IN THE PAST MONTH?: NO
1. IN THE PAST MONTH, HAVE YOU WISHED YOU WERE DEAD OR WISHED YOU COULD GO TO SLEEP AND NOT WAKE UP?: NO

## 2025-03-14 NOTE — ED TRIAGE NOTES
Pt presents to the ED with c/o fall forward and hitting right side of face. C/o right wrist pain. Denies LOC or thinners. Denies any cervical tenderness.      Triage Assessment (Adult)       Row Name 03/14/25 0949          Triage Assessment    Airway WDL WDL        Respiratory WDL    Respiratory WDL WDL        Skin Circulation/Temperature WDL    Skin Circulation/Temperature WDL WDL        Cardiac WDL    Cardiac WDL WDL        Peripheral/Neurovascular WDL    Peripheral Neurovascular WDL WDL        Cognitive/Neuro/Behavioral WDL    Cognitive/Neuro/Behavioral WDL WDL

## 2025-03-14 NOTE — ED NOTES
Reviewed discharge instructions and answered all questions. Pt declined wrist splint after explanation of form she needed to sign. Please see provider's note for assessment.

## 2025-03-14 NOTE — DISCHARGE INSTRUCTIONS
CT scan of the head and orbits showed the hematoma, but thankfully no broken bones or bleeding on the brain.  X-ray of the wrist does not show any obvious fracture or break.  There is a subtle lucency adjacent to an area of cystic change in the radial aspect of the scaphoid waist, which could reflect a nondisplaced fracture, however this also may be artifactual nature.   if wrist pain persists would encourage you to follow-up with orthopedic surgery, referral provided.

## 2025-03-14 NOTE — ED PROVIDER NOTES
EMERGENCY DEPARTMENT ENCOUNTER      NAME: Chioma Mccormick  AGE: 50 year old female  YOB: 1975  MRN: [id number]  EVALUATION DATE & TIME: No admission date for patient encounter.    PCP: Akilah Navarro    ED PROVIDER: Luda Reyes MD    Chief Complaint   Patient presents with    Fall    Head Injury         FINAL IMPRESSION:  1. Traumatic hematoma of forehead, initial encounter    2. Fall, initial encounter    3. Right wrist pain          ED COURSE & MEDICAL DECISION MAKING:    Pertinent Labs & Imaging studies reviewed. (See chart for details)  50 year old female with history of Down syndrome, diabetes, HLD and anxiety who presents to the Emergency Department for evaluation of facial pain and right wrist pain after a fall from standing.  Large right sided periorbital hematoma with abrasion.  Will obtain neuroimaging to rule out skull fracture, ICH, orbital fracture as well as wrist fracture versus sprain.    Patient initially seen evaluated by myself in triage area due to boarding crisis.  Given Tylenol for pain.  CT of the head, facial bones reveals hematoma but no acute abnormalities.  X-ray of the right wrist no obvious fracture.  There is a cyst within the scaphoid and a subtle lucency next to this which could represent a nondisplaced fracture but patient really does not have any scaphoid tenderness on examination, and therefore we will forego additional imaging or splint placement given her fairly pain-free range of motion.  Home with outpatient referral for Ortho should her wrist pain persist.      ED Course as of 03/14/25 1026   Fri Mar 14, 2025   1013 Head CT w/o contrast  CT independently interpreted by myself with large frontal right scalp hematoma but no ICH       Medical Decision Making  Obtained supplemental history:Supplemental history obtained?: Family Member/Significant Other  Reviewed external records: External records reviewed?: Outpatient Record: 3/3 clinic visit in 3/4  urinalysis  Care impacted by chronic illness:Diabetes, Hyperlipidemia, and Other: Down syndrome  Did you consider but not order tests?: Work up considered but not performed and documented in chart, if applicable  Did you interpret images independently?: Independent interpretation of ECG and images noted in documentation, when applicable.  Consultation discussion with other provider:Did you involve another provider (consultant, , pharmacy, etc.)?: No  Discharge. No recommendations on prescription strength medication(s). See documentation for any additional details.    MIPS: Adult Minor Head Trauma:Loss of consciousness with evidence of trauma above clavicles: laceration, abrasion, bruising, ecchymoses, hematoma, swelling, or fracture      At the conclusion of the encounter I discussed the results of all of the tests and the disposition. The questions were answered. The patient or family acknowledged understanding and was agreeable with the care plan.      MEDICATIONS GIVEN IN THE EMERGENCY:  Medications   acetaminophen (TYLENOL) tablet 975 mg (975 mg Oral $Given 3/14/25 9177)       NEW PRESCRIPTIONS STARTED AT TODAY'S ER VISIT  New Prescriptions    No medications on file          =================================================================    HPI    Patient information was obtained from: Patient, foster mother    Use of Intrepreter: N/A      Chioma Mccormick is a 50 year old female with pertinent medical history of Down syndrome, diabetes, HLD and anxiety who presents with head injury.  Patient was getting off of a plane flying home.  Tripped over the threshold of the plane onto the jet way and fell forward hitting her head/right side of the face.  No LOC.  She notes some pain to the face, but denies any headache, nausea, vomiting.  Has not taken anything for symptoms prior to arrival.  Notes some bruising to the right side of the face/orbit region.  Does not however had any visual changes nor pain with  "extraocular movements.  Denies any new neck or back pain.  Also notes right sided wrist pain.  Patient is not anticoagulated.      PAST MEDICAL HISTORY:  No past medical history on file.    PAST SURGICAL HISTORY:  No past surgical history on file.    CURRENT MEDICATIONS:    Cannot display prior to admission medications because the patient has not been admitted in this contact.       ALLERGIES:  Allergies   Allergen Reactions    Jardiance [Empagliflozin] Unknown     Gets utis from jardiance       FAMILY HISTORY:  Family History   Problem Relation Age of Onset    Cancer Mother     Diabetes No family hx of     Heart Disease No family hx of     Breast Cancer No family hx of     Colon Cancer No family hx of        SOCIAL HISTORY:  Social History     Tobacco Use    Smoking status: Never     Passive exposure: Never    Smokeless tobacco: Never   Vaping Use    Vaping status: Never Used   Substance Use Topics    Alcohol use: No    Drug use: No        VITALS:  Patient Vitals for the past 24 hrs:   BP Temp Temp src Pulse Resp SpO2 Height Weight   03/14/25 0951 -- -- -- -- -- -- 1.549 m (5' 1\") 68 kg (150 lb)   03/14/25 0948 127/60 98.3  F (36.8  C) Oral 87 18 97 % -- --       PHYSICAL EXAM    General Appearance: Well-appearing, well-nourished, no acute distress  Head:  Normocephalic, atraumatic  Eyes:  PERRL, conjunctiva/corneas clear, EOM's intact, no reproducible tenderness to palpation of the periorbital anatomy however exam is limited by large superior greater than inferior periorbital hematoma with abrasion to the right side  ENT:  Lips, mucosa, and tongue normal; membranes are moist without pallor, no malocclusion  Neck:  Supple, no midline tenderness  Cardio:  Regular rate and rhythm  Pulm:  No respiratory distress  Back:  No midline tenderness to palpation, no paraspinal tenderness  Extremities: Mild pain with flexion extension to the radial aspect of the right wrist.  Extremities are otherwise atraumatic  Skin:  Skin " warm, dry, no rashes  Neuro:  Alert and oriented ×3, moving all extremities, no gross sensory defects, GCS 15     RADIOLOGY/LABS:  Reviewed all pertinent imaging. Please see official radiology report. All pertinent labs reviewed and interpreted.    Results for orders placed or performed during the hospital encounter of 03/14/25   Head CT w/o contrast    Impression    IMPRESSION:  1.  Moderate-sized right forehead scalp hematoma.  2.  No CT evidence for acute intracranial process.  3.  Brain atrophy and presumed chronic microvascular ischemic changes as above.   CT Facial Bones without Contrast    Impression    IMPRESSION: There is a moderate-sized right forehead scalp hematoma extending down over the right face to involve the preseptal tissues. No fractures. No evidence for abnormality of the orbits or globes on either side. The paranasal sinuses are well   aerated. Temporomandibular joint alignment bilaterally is normal.   XR Wrist Right G/E 3 Views    Impression    IMPRESSION:     Subtle lucency adjacent to an area of cystic change in the radial aspect of the scaphoid waist, which could reflect a nondisplaced fracture, however this also may be artifactual nature. Recommend correlation with point tenderness. Cross sectional imaging   could be helpful for further evaluation if desired.       NOTE: ABNORMAL REPORT    THE DICTATION ABOVE DESCRIBES AN ABNORMALITY FOR WHICH FOLLOW-UP IS NEEDED.            Luda Reyes MD  Emergency Medicine  [facility] EMERGENCY ROOM  [address] [facility] [address] [phone]  Dept: [phone]     Luda Reyes MD  03/14/25 7327

## 2025-03-26 ENCOUNTER — OFFICE VISIT (OUTPATIENT)
Dept: FAMILY MEDICINE | Facility: CLINIC | Age: 50
End: 2025-03-26
Payer: MEDICARE

## 2025-03-26 VITALS
HEART RATE: 63 BPM | DIASTOLIC BLOOD PRESSURE: 72 MMHG | BODY MASS INDEX: 26.24 KG/M2 | WEIGHT: 139 LBS | RESPIRATION RATE: 18 BRPM | HEIGHT: 61 IN | OXYGEN SATURATION: 94 % | SYSTOLIC BLOOD PRESSURE: 118 MMHG | TEMPERATURE: 97.7 F

## 2025-03-26 DIAGNOSIS — R52 PAIN AGGRAVATED BY STAIR CLIMBING: ICD-10-CM

## 2025-03-26 DIAGNOSIS — G89.29 CHRONIC PAIN OF RIGHT KNEE: Primary | ICD-10-CM

## 2025-03-26 DIAGNOSIS — M25.561 CHRONIC PAIN OF RIGHT KNEE: Primary | ICD-10-CM

## 2025-03-26 PROCEDURE — 99214 OFFICE O/P EST MOD 30 MIN: CPT | Performed by: NURSE PRACTITIONER

## 2025-03-26 PROCEDURE — 3078F DIAST BP <80 MM HG: CPT | Performed by: NURSE PRACTITIONER

## 2025-03-26 PROCEDURE — 1125F AMNT PAIN NOTED PAIN PRSNT: CPT | Performed by: NURSE PRACTITIONER

## 2025-03-26 PROCEDURE — 3074F SYST BP LT 130 MM HG: CPT | Performed by: NURSE PRACTITIONER

## 2025-03-26 ASSESSMENT — PAIN SCALES - GENERAL: PAINLEVEL_OUTOF10: MILD PAIN (2)

## 2025-03-26 NOTE — PATIENT INSTRUCTIONS
I went ahead and ordered the MRI of the right knee for further evaluation to make sure their isn't a meniscus tear.  It's ok to keep using the advil/ibuprofen to keep pain tolerable.     We'll reach out to go over results when I get a chance to look them over.

## 2025-04-09 ENCOUNTER — HOSPITAL ENCOUNTER (OUTPATIENT)
Dept: MRI IMAGING | Facility: CLINIC | Age: 50
Discharge: HOME | End: 2025-04-09
Attending: NURSE PRACTITIONER
Payer: MEDICARE

## 2025-04-09 DIAGNOSIS — R52 PAIN AGGRAVATED BY STAIR CLIMBING: ICD-10-CM

## 2025-04-09 DIAGNOSIS — M25.561 CHRONIC PAIN OF RIGHT KNEE: ICD-10-CM

## 2025-04-09 DIAGNOSIS — G89.29 CHRONIC PAIN OF RIGHT KNEE: ICD-10-CM

## 2025-04-09 PROCEDURE — 73721 MRI JNT OF LWR EXTRE W/O DYE: CPT | Mod: RT

## 2025-04-10 ENCOUNTER — TELEPHONE (OUTPATIENT)
Dept: FAMILY MEDICINE | Facility: CLINIC | Age: 50
End: 2025-04-10
Payer: MEDICARE

## 2025-04-10 DIAGNOSIS — M13.161 INFLAMMATION OF JOINT OF RIGHT KNEE: Primary | ICD-10-CM

## 2025-04-10 NOTE — TELEPHONE ENCOUNTER
Test Results    Contacts       Contact Date/Time Type Contact Phone/Fax    04/10/2025 12:28 PM CDT Phone (Incoming) donlitzy amaro (Emergency Contact) [phone] (H)            Who ordered the test:  MELIA    Type of test: Lab and MRI    Date of test:  04/09/2025     Where was the test performed:  [facility]    What are your questions/concerns?:  RESULTS    Could we [facility] this [facility] to you [email or web address] or would you prefer to receive a phone call?:   Patient would prefer a phone call   Okay to leave a detailed message?: Yes at Other phone number:  [phone]

## 2025-04-10 NOTE — TELEPHONE ENCOUNTER
Severiano Fernandes, ALYSON  4/10/2025  1:29 PM CDT       Please contact patient.     MRI shows no tear or broken bones, but there is quite a bit of inflammation within the joint itself.  This could be addressed with a lot of different treatments including RICE, NSAIDs, and PT to help manage the patellar maltracking.  Possible cortisone injection could also be beneficial for bringing down the edema/inflammation.     Let me know if open to PT or if she would like to see orthopedics to discuss treatment options.     Note above relayed to care giver- she will speak with Chioma and call back.

## 2025-04-10 NOTE — TELEPHONE ENCOUNTER
Called from Evelyn, foster mother.     They would like to proceed with physical therapy first.   Patient would not want the injection if there are other choices.     Routing to provider to review note and pended order for approval.     Prasad RUTH RN

## 2025-04-15 ENCOUNTER — HOSPITAL ENCOUNTER (OUTPATIENT)
Dept: MAMMOGRAPHY | Facility: CLINIC | Age: 50
Discharge: HOME | End: 2025-04-15
Attending: PHYSICIAN ASSISTANT | Admitting: PHYSICIAN ASSISTANT
Payer: MEDICARE

## 2025-04-15 DIAGNOSIS — Z12.31 VISIT FOR SCREENING MAMMOGRAM: ICD-10-CM

## 2025-04-15 PROCEDURE — 77067 SCR MAMMO BI INCL CAD: CPT

## 2025-04-15 PROCEDURE — 77063 BREAST TOMOSYNTHESIS BI: CPT

## 2025-04-17 ENCOUNTER — OFFICE VISIT (OUTPATIENT)
Dept: FAMILY MEDICINE | Facility: CLINIC | Age: 50
End: 2025-04-17
Payer: MEDICARE

## 2025-04-17 VITALS
OXYGEN SATURATION: 96 % | TEMPERATURE: 97.1 F | SYSTOLIC BLOOD PRESSURE: 112 MMHG | BODY MASS INDEX: 25.75 KG/M2 | DIASTOLIC BLOOD PRESSURE: 64 MMHG | WEIGHT: 136.38 LBS | RESPIRATION RATE: 16 BRPM | HEIGHT: 61 IN | HEART RATE: 67 BPM

## 2025-04-17 DIAGNOSIS — E11.9 TYPE 2 DIABETES MELLITUS WITHOUT COMPLICATION, WITHOUT LONG-TERM CURRENT USE OF INSULIN (H): ICD-10-CM

## 2025-04-17 DIAGNOSIS — R82.90 ABNORMAL URINE ODOR: ICD-10-CM

## 2025-04-17 DIAGNOSIS — J06.9 UPPER RESPIRATORY TRACT INFECTION, UNSPECIFIED TYPE: Primary | ICD-10-CM

## 2025-04-17 LAB
ALBUMIN UR-MCNC: ABNORMAL MG/DL
ANION GAP SERPL CALCULATED.3IONS-SCNC: 9 MMOL/L (ref 7–15)
APPEARANCE UR: ABNORMAL
BACTERIA #/AREA URNS HPF: ABNORMAL /HPF
BILIRUB UR QL STRIP: NEGATIVE
BUN SERPL-MCNC: 11 MG/DL (ref 6–20)
CALCIUM SERPL-MCNC: 9.2 MG/DL (ref 8.8–10.4)
CHLORIDE SERPL-SCNC: 106 MMOL/L (ref 98–107)
COLOR UR AUTO: YELLOW
CREAT SERPL-MCNC: 0.62 MG/DL (ref 0.51–0.95)
EGFRCR SERPLBLD CKD-EPI 2021: >90 ML/MIN/1.73M2
ERYTHROCYTE [DISTWIDTH] IN BLOOD BY AUTOMATED COUNT: 12.7 % (ref 10–15)
EST. AVERAGE GLUCOSE BLD GHB EST-MCNC: 126 MG/DL
FLUAV AG SPEC QL IA: NEGATIVE
FLUBV AG SPEC QL IA: NEGATIVE
GLUCOSE SERPL-MCNC: 94 MG/DL (ref 70–99)
GLUCOSE UR STRIP-MCNC: NEGATIVE MG/DL
HBA1C MFR BLD: 6 % (ref 0–5.6)
HCO3 SERPL-SCNC: 28 MMOL/L (ref 22–29)
HCT VFR BLD AUTO: 40.8 % (ref 35–47)
HGB BLD-MCNC: 13.4 G/DL (ref 11.7–15.7)
HGB UR QL STRIP: NEGATIVE
KETONES UR STRIP-MCNC: ABNORMAL MG/DL
LEUKOCYTE ESTERASE UR QL STRIP: ABNORMAL
MCH RBC QN AUTO: 31.6 PG (ref 26.5–33)
MCHC RBC AUTO-ENTMCNC: 32.8 G/DL (ref 31.5–36.5)
MCV RBC AUTO: 96 FL (ref 78–100)
NITRATE UR QL: POSITIVE
PH UR STRIP: 5.5 [PH] (ref 5–8)
PLATELET # BLD AUTO: 158 10E3/UL (ref 150–450)
POTASSIUM SERPL-SCNC: 4.6 MMOL/L (ref 3.4–5.3)
RBC # BLD AUTO: 4.24 10E6/UL (ref 3.8–5.2)
RBC #/AREA URNS AUTO: ABNORMAL /HPF
SODIUM SERPL-SCNC: 143 MMOL/L (ref 135–145)
SP GR UR STRIP: 1.02 (ref 1–1.03)
SQUAMOUS #/AREA URNS AUTO: ABNORMAL /LPF
UROBILINOGEN UR STRIP-ACNC: 1 E.U./DL
WBC # BLD AUTO: 6 10E3/UL (ref 4–11)
WBC #/AREA URNS AUTO: ABNORMAL /HPF

## 2025-04-17 RX ORDER — IBUPROFEN 200 MG
200 TABLET ORAL EVERY 4 HOURS PRN
COMMUNITY

## 2025-04-17 RX ORDER — FEXOFENADINE HCL 180 MG/1
180 TABLET ORAL DAILY
Qty: 30 TABLET | Refills: 0 | Status: SHIPPED | OUTPATIENT
Start: 2025-04-17

## 2025-04-17 NOTE — PROGRESS NOTES
"  Assessment & Plan     Upper respiratory tract infection, unspecified type  Increase rest and fluids as needed.    - fexofenadine (ALLEGRA) 180 MG tablet  Dispense: 30 tablet; Refill: 0  - Influenza A & B Antigen - Clinic Collect    Abnormal urine odor  Over the past couple of days  - UA Macroscopic with reflex to Microscopic and Culture - Clinic Collect  - Urine Microscopic Exam  - Urine Culture    Type 2 diabetes mellitus without complication, without long-term current use of insulin (H)  - Hemoglobin A1c  - Basic metabolic panel  (Ca, Cl, CO2, Creat, Gluc, K, Na, BUN)  - CBC with platelets      The longitudinal plan of care for the diagnosis(es)/condition(s) as documented were addressed during this visit. Due to the added complexity in care, I will continue to support Chioma in the subsequent management and with ongoing continuity of care.        BMI  Estimated body mass index is 25.77 kg/m  as calculated from the following:    Height as of this encounter: 1.549 m (5' 1\").    Weight as of this encounter: 61.9 kg (136 lb 6 oz).             Roseanna Bedolla is a 50 year old, presenting for the following health issues:  Cough and noise feels burning        4/17/2025     8:59 AM   Additional Questions   Roomed by PATRICE Lo   Accompanied by self     Via the Health Maintenance questionnaire, the patient has reported the following services have been completed -Eye Exam: mead 2024-03-11, this information has been sent to the abstraction team.  History of Present Illness       Reason for visit:  Cough congestion sinuses burning       Pain inside nose x 2 days along with sore throat, runny nose and nasal congestion.  Denies fevers, chills, body aches      Review of Systems  Constitutional, HEENT, cardiovascular, pulmonary, gi and gu systems are negative, except as otherwise noted.      Objective    /64   Pulse 67   Temp 97.1  F (36.2  C)   Resp 16   Ht 1.549 m (5' 1\")   Wt 61.9 kg (136 lb 6 oz)   LMP  " (LMP Unknown)   SpO2 96%   BMI 25.77 kg/m    Body mass index is 25.77 kg/m .  Physical Exam   GENERAL: alert and no distress  EYES: Eyes grossly normal to inspection, PERRL and conjunctivae and sclerae normal  HENT: ear canals and TM's normal, nose and mouth without ulcers or lesions  NECK: no adenopathy, no asymmetry, masses, or scars  RESP: lungs clear to auscultation - no rales, rhonchi or wheezes  CV: regular rate and rhythm, normal S1 S2, no S3 or S4, no murmur, click or rub, no peripheral edema  ABDOMEN: soft, nontender, no hepatosplenomegaly, no masses and bowel sounds normal  MS: no gross musculoskeletal defects noted, no edema  SKIN: no suspicious lesions or rashes  NEURO: Normal strength and tone, mentation intact and speech normal  PSYCH: mentation appears normal, affect normal/bright            Signed Electronically by: Akilah Navarro PA-C

## 2025-04-22 ENCOUNTER — DOCUMENTATION ONLY (OUTPATIENT)
Dept: FAMILY MEDICINE | Facility: CLINIC | Age: 50
End: 2025-04-22

## 2025-04-22 ENCOUNTER — OFFICE VISIT (OUTPATIENT)
Dept: FAMILY MEDICINE | Facility: CLINIC | Age: 50
End: 2025-04-22
Payer: MEDICARE

## 2025-04-22 VITALS
WEIGHT: 136 LBS | DIASTOLIC BLOOD PRESSURE: 72 MMHG | BODY MASS INDEX: 26.7 KG/M2 | SYSTOLIC BLOOD PRESSURE: 114 MMHG | RESPIRATION RATE: 18 BRPM | OXYGEN SATURATION: 98 % | HEIGHT: 60 IN | HEART RATE: 67 BPM | TEMPERATURE: 97.5 F

## 2025-04-22 DIAGNOSIS — Z23 NEED FOR VACCINATION: ICD-10-CM

## 2025-04-22 DIAGNOSIS — N92.6 MISSED PERIODS: ICD-10-CM

## 2025-04-22 DIAGNOSIS — E11.9 TYPE 2 DIABETES MELLITUS WITHOUT COMPLICATION, WITHOUT LONG-TERM CURRENT USE OF INSULIN (H): ICD-10-CM

## 2025-04-22 DIAGNOSIS — Z00.00 ENCOUNTER FOR MEDICARE ANNUAL WELLNESS EXAM: Primary | ICD-10-CM

## 2025-04-22 PROCEDURE — 99213 OFFICE O/P EST LOW 20 MIN: CPT | Mod: 25 | Performed by: PHYSICIAN ASSISTANT

## 2025-04-22 PROCEDURE — G2211 COMPLEX E/M VISIT ADD ON: HCPCS | Performed by: PHYSICIAN ASSISTANT

## 2025-04-22 PROCEDURE — 3074F SYST BP LT 130 MM HG: CPT | Performed by: PHYSICIAN ASSISTANT

## 2025-04-22 PROCEDURE — 3078F DIAST BP <80 MM HG: CPT | Performed by: PHYSICIAN ASSISTANT

## 2025-04-22 PROCEDURE — G0439 PPPS, SUBSEQ VISIT: HCPCS | Performed by: PHYSICIAN ASSISTANT

## 2025-04-22 SDOH — HEALTH STABILITY: PHYSICAL HEALTH: ON AVERAGE, HOW MANY DAYS PER WEEK DO YOU ENGAGE IN MODERATE TO STRENUOUS EXERCISE (LIKE A BRISK WALK)?: 5 DAYS

## 2025-04-22 ASSESSMENT — ANXIETY QUESTIONNAIRES
6. BECOMING EASILY ANNOYED OR IRRITABLE: NOT AT ALL
GAD7 TOTAL SCORE: 5
7. FEELING AFRAID AS IF SOMETHING AWFUL MIGHT HAPPEN: NOT AT ALL
GAD7 TOTAL SCORE: 5
8. IF YOU CHECKED OFF ANY PROBLEMS, HOW DIFFICULT HAVE THESE MADE IT FOR YOU TO DO YOUR WORK, TAKE CARE OF THINGS AT HOME, OR GET ALONG WITH OTHER PEOPLE?: SOMEWHAT DIFFICULT
3. WORRYING TOO MUCH ABOUT DIFFERENT THINGS: SEVERAL DAYS
2. NOT BEING ABLE TO STOP OR CONTROL WORRYING: SEVERAL DAYS
5. BEING SO RESTLESS THAT IT IS HARD TO SIT STILL: SEVERAL DAYS
7. FEELING AFRAID AS IF SOMETHING AWFUL MIGHT HAPPEN: NOT AT ALL
4. TROUBLE RELAXING: SEVERAL DAYS
1. FEELING NERVOUS, ANXIOUS, OR ON EDGE: SEVERAL DAYS
GAD7 TOTAL SCORE: 5
IF YOU CHECKED OFF ANY PROBLEMS ON THIS QUESTIONNAIRE, HOW DIFFICULT HAVE THESE PROBLEMS MADE IT FOR YOU TO DO YOUR WORK, TAKE CARE OF THINGS AT HOME, OR GET ALONG WITH OTHER PEOPLE: SOMEWHAT DIFFICULT

## 2025-04-22 ASSESSMENT — PATIENT HEALTH QUESTIONNAIRE - PHQ9
SUM OF ALL RESPONSES TO PHQ QUESTIONS 1-9: 0
10. IF YOU CHECKED OFF ANY PROBLEMS, HOW DIFFICULT HAVE THESE PROBLEMS MADE IT FOR YOU TO DO YOUR WORK, TAKE CARE OF THINGS AT HOME, OR GET ALONG WITH OTHER PEOPLE: NOT DIFFICULT AT ALL
SUM OF ALL RESPONSES TO PHQ QUESTIONS 1-9: 0

## 2025-04-22 ASSESSMENT — SOCIAL DETERMINANTS OF HEALTH (SDOH): HOW OFTEN DO YOU GET TOGETHER WITH FRIENDS OR RELATIVES?: TWICE A WEEK

## 2025-04-22 NOTE — PATIENT INSTRUCTIONS
Here is the phone number for urology: [phone].     Patient Education   Preventive Care Advice   This is general advice given by our system to help you stay healthy. However, your care team may have specific advice just for you. Please talk to your care team about your preventive care needs.  Nutrition  Eat 5 or more servings of fruits and vegetables each day.  Try wheat bread, brown rice and whole grain pasta (instead of white bread, rice, and pasta).  Get enough calcium and vitamin D. Check the label on foods and aim for 100% of the RDA (recommended daily allowance).  Lifestyle  Exercise at least 150 minutes each week  (30 minutes a day, 5 days a week).  Do muscle strengthening activities 2 days a week. These help control your weight and prevent disease.  No smoking.  Wear sunscreen to prevent skin cancer.  Have a dental exam and cleaning every 6 months.  Yearly exams  See your health care team every year to talk about:  Any changes in your health.  Any medicines your care team has prescribed.  Preventive care, family planning, and ways to prevent chronic diseases.  Shots (vaccines)   HPV shots (up to age 26), if you've never had them before.  Hepatitis B shots (up to age 59), if you've never had them before.  COVID-19 shot: Get this shot when it's due.  Flu shot: Get a flu shot every year.  Tetanus shot: Get a tetanus shot every 10 years.  Pneumococcal, hepatitis A, and RSV shots: Ask your care team if you need these based on your risk.  Shingles shot (for age 50 and up)  General health tests  Diabetes screening:  Starting at age 35, Get screened for diabetes at least every 3 years.  If you are younger than age 35, ask your care team if you should be screened for diabetes.  Cholesterol test: At age 39, start having a cholesterol test every 5 years, or more often if advised.  Bone density scan (DEXA): At age 50, ask your care team if you should have this scan for osteoporosis (brittle bones).  Hepatitis C:  Get tested at least once in your life.  STIs (sexually transmitted infections)  Before age 24: Ask your care team if you should be screened for STIs.  After age 24: Get screened for STIs if you're at risk. You are at risk for STIs (including HIV) if:  You are sexually active with more than one person.  You don't use condoms every time.  You or a partner was diagnosed with a sexually transmitted infection.  If you are at risk for HIV, ask about PrEP medicine to prevent HIV.  Get tested for HIV at least once in your life, whether you are at risk for HIV or not.  Cancer screening tests  Cervical cancer screening: If you have a cervix, begin getting regular cervical cancer screening tests starting at age 21.  Breast cancer scan (mammogram): If you've ever had breasts, begin having regular mammograms starting at age 40. This is a scan to check for breast cancer.  Colon cancer screening: It is important to start screening for colon cancer at age 45.  Have a colonoscopy test every 10 years (or more often if you're at risk) Or, ask your provider about stool tests like a FIT test every year or Cologuard test every 3 years.  To learn more about your testing options, visit:   .  For help making a decision, visit:   https://bit.ly/bz88642.  Prostate cancer screening test: If you have a prostate, ask your care team if a prostate cancer screening test (PSA) at age 55 is right for you.  Lung cancer screening: If you are a current or former smoker ages 50 to 80, ask your care team if ongoing lung cancer screenings are right for you.  For informational purposes only. Not to replace the advice of your health care provider. Copyright   2023 [facility]. All rights reserved. Clinically reviewed by the [facility]. [employer] 722071 - REV 01/24.  Learning About Activities of Daily Living  What are activities of daily living?     Activities of daily living (ADLs) are the basic self-care tasks you do  every day. These include eating, bathing, dressing, and moving around.  As you age, and if you have health problems, you may find that it's harder to do some of these tasks. If so, your doctor can suggest ideas that may help.  To measure what kind of help you may need, your doctor will ask how well you are able to do ADLs. Let your doctor know if there are any tasks that you are having trouble doing. This is an important first step to getting help. And when you have the help you need, you can stay as independent as possible.  How will a doctor assess your ADLs?  Asking about ADLs is part of a routine health checkup your doctor will likely do as you age. Your health check might be done in a doctor's office, in your home, or at a hospital. The goal is to find out if you are having any problems that could make it hard to care for yourself or that make it unsafe for you to be on your own.  To measure your ADLs, your doctor will ask how hard it is for you to do routine tasks. Your doctor may also want to know if you have changed the way you do a task because of a health problem. Your doctor may watch how you:  Walk back and forth.  Keep your balance while you stand or walk.  Move from sitting to standing or from a bed to a chair.  Button or unbutton a shirt or sweater.  Remove and put on your shoes.  It's common to feel a little worried or anxious if you find you can't do all the things you used to be able to do. Talking with your doctor about ADLs is a way to make sure you're as safe as possible and able to care for yourself as well as you can. You may want to bring a caregiver, friend, or family member to your checkup. They can help you talk to your doctor.  Follow-up care is a key part of your treatment and safety. Be sure to make and go to all appointments, and call your doctor if you are having problems. It's also a good idea to know your test results and keep a list of the medicines you take.  Current as of:  October 24, 2024  Content Version: 14.4    5334-8018 [employer].   Care instructions adapted under license by your healthcare professional. If you have questions about a medical condition or this instruction, always ask your healthcare professional. [employer] disclaims any warranty or liability for your use of this information.    Preventing Falls: Care Instructions  Injuries and health problems such as trouble walking or poor eyesight can increase your risk of falling. So can some medicines. But there are things you can do to help prevent falls. You can exercise to get stronger. You can also arrange your home to make it safer.    Talk to your doctor about the medicines you take. Ask if any of them increase the risk of falls and whether they can be changed or stopped.   Try to exercise regularly. It can help improve your strength and balance. This can help lower your risk of falling.         Practice fall safety and prevention.   Wear low-heeled shoes that fit well and give your feet good support. Talk to your doctor if you have foot problems that make this hard.  Carry a cellphone or wear a medical alert device that you can use to call for help.  Use stepladders instead of chairs to reach high objects. Don't climb if you're at risk for falls. Ask for help, if needed.  Wear the correct eyeglasses, if you need them.        Make your home safer.   Remove rugs, cords, clutter, and furniture from walkways.  Keep your house well lit. Use night-lights in hallways and bathrooms.  Install and use sturdy handrails on stairways.  Wear nonskid footwear, even inside. Don't walk barefoot or in socks without shoes.        Be safe outside.   Use handrails, curb cuts, and ramps whenever possible.  Keep your hands free by using a shoulder bag or backpack.  Try to walk in well-lit areas. Watch out for uneven ground, changes in pavement, and debris.  Be careful in the winter. Walk on the grass or gravel when  "sidewalks are slippery. Use de-icer on steps and walkways. Add non-slip devices to shoes.    Put grab bars and nonskid mats in your shower or tub and near the toilet. Try to use a shower chair or bath bench when bathing.   Get into a tub or shower by putting in your weaker leg first. Get out with your strong side first. Have a phone or medical alert device in the bathroom with you.   Where can you learn more?  Go to https://www.[employer].net/patiented  Enter G117 in the search box to learn more about \"Preventing Falls: Care Instructions.\"  Current as of: July 31, 2024  Content Version: 14.4    3122-9320 [employer].   Care instructions adapted under license by your healthcare professional. If you have questions about a medical condition or this instruction, always ask your healthcare professional. [employer] disclaims any warranty or liability for your use of this information.    Bladder Training: Care Instructions  Your Care Instructions     Bladder training is used to treat urge incontinence and stress incontinence. Urge incontinence means that the need to urinate comes on so fast that you can't get to a toilet in time. Stress incontinence means that you leak urine because of pressure on your bladder. For example, it may happen when you laugh, cough, or lift something heavy.  Bladder training can increase how long you can wait before you have to urinate. It can also help your bladder hold more urine. And it can give you better control over the urge to urinate.  It is important to remember that bladder training takes a few weeks to a few months to make a difference. You may not see results right away, but don't give up.  Follow-up care is a key part of your treatment and safety. Be sure to make and go to all appointments, and call your doctor if you are having problems. It's also a good idea to know your test results and keep a list of the medicines you take.  How can you care for yourself " at home?  Work with your doctor to come up with a bladder training program that is right for you. You may use one or more of the following methods.  Delayed urination  In the beginning, try to keep from urinating for 5 minutes after you first feel the need to go.  While you wait, take deep, slow breaths to relax. Kegel exercises can also help you delay the need to go to the bathroom.  After some practice, when you can easily wait 5 minutes to urinate, try to wait 10 minutes before you urinate.  Slowly increase the waiting period until you are able to control when you have to urinate.  Scheduled urination  Empty your bladder when you first wake up in the morning.  Schedule times throughout the day when you will urinate.  Start by going to the bathroom every hour, even if you don't need to go.  Slowly increase the time between trips to the bathroom.  When you have found a schedule that works well for you, keep doing it.  If you wake up during the night and have to urinate, do it. Apply your schedule to waking hours only.  Kegel exercises  These tighten and strengthen pelvic muscles, which can help you control the flow of urine. (If doing these exercises causes pain, stop doing them and talk with your doctor.) To do Kegel exercises:  Squeeze your muscles as if you were trying not to pass gas. Or squeeze your muscles as if you were stopping the flow of urine. Your belly, legs, and buttocks shouldn't move.  Hold the squeeze for 3 seconds, then relax for 5 to 10 seconds.  Start with 3 seconds, then add 1 second each week until you are able to squeeze for 10 seconds.  Repeat the exercise 10 times a session. Do 3 to 8 sessions a day.  When should you call for help?  Watch closely for changes in your health, and be sure to contact your doctor if:    Your incontinence is getting worse.     You do not get better as expected.   Where can you learn more?  Go to https://www.healthwise.net/patiented  Enter V684 in the search box  "to learn more about \"Bladder Training: Care Instructions.\"  Current as of: April 30, 2024  Content Version: 14.4 2024-2025 [employer].   Care instructions adapted under license by your healthcare professional. If you have questions about a medical condition or this instruction, always ask your healthcare professional. [employer] disclaims any warranty or liability for your use of this information.       "

## 2025-04-22 NOTE — PROGRESS NOTES
Preventive Care Visit  [facility] RENA Navarro PA-C, Family Medicine  Apr 22, 2025      Assessment & Plan     Encounter for Medicare annual wellness exam    Missed periods  Unsure of lmp, has been at least 2 years, will check fsh  - Follicle stimulating hormone    Need for vaccination  - hepatitis b vaccine recombinant (RECOMBIVAX-HB) 10 MCG/ML injection  Dispense: 1 mL; Refill: 0    Type 2 diabetes mellitus without complication, without long-term current use of insulin (H)  Well controlled, will follow up 3 months for diabetes check  - Hemoglobin A1c              BMI  Estimated body mass index is 26.56 kg/m  as calculated from the following:    Height as of this encounter: 1.524 m (5').    Weight as of this encounter: 61.7 kg (136 lb).   Weight management plan: Discussed healthy diet and exercise guidelines    Counseling  Appropriate preventive services were addressed with this patient via screening, questionnaire, or discussion as appropriate for fall prevention, nutrition, physical activity, Tobacco-use cessation, social engagement, weight loss and cognition.  Checklist reviewing preventive services available has been given to the patient.  Reviewed patient's diet, addressing concerns and/or questions.   Updated plan of care.  Patient reported difficulty with activities of daily living were addressed today.Information on urinary incontinence and treatment options given to patient.           Roseanna Bedolla is a 50 year old, presenting for the following:  Medicare Visit (Discuss lab result from last week)        4/22/2025     4:14 PM   Additional Questions   Roomed by PATRICE Lo   Accompanied by self         Via the Health Maintenance questionnaire, the patient has reported the following services have been completed -Eye Exam: mead 2024-03-15, this information has been sent to the abstraction team.    HPI    Advance Care Planning    Discussed advance care planning with patient;  however, patient declined at this time.        4/22/2025   General Health   How would you rate your overall physical health? Good   Feel stress (tense, anxious, or unable to sleep) Not at all         4/22/2025   Nutrition   Diet: Diabetic         4/22/2025   Exercise   Days per week of moderate/strenous exercise 5 days         4/22/2025   Social Factors   Frequency of gathering with friends or relatives Twice a week   Worry food won't last until get money to buy more No   Food not last or not have enough money for food? No   Do you have housing? (Housing is defined as stable permanent housing and does not include staying ouside in a car, in a tent, in an abandoned building, in an overnight shelter, or couch-surfing.) Yes   Are you worried about losing your housing? No   Lack of transportation? No   Unable to get utilities (heat,electricity)? No         4/22/2025   Fall Risk   Fallen 2 or more times in the past year? Yes   Trouble with walking or balance? No   Gait Speed Test (Document in seconds) 5   Gait Speed Test Interpretation Less than or equal to 5.00 seconds - PASS          4/22/2025   Activities of Daily Living- Home Safety   Needs help with the following daily activites Telephone use    Transportation   Safety concerns in the home None of the above       Multiple values from one day are sorted in reverse-chronological order         4/22/2025   Dental   Dentist two times every year? Yes         4/22/2025   Hearing Screening   Hearing concerns? None of the above         4/22/2025   Driving Risk Screening   Patient/family members have concerns about driving No         4/22/2025   General Alertness/Fatigue Screening   Have you been more tired than usual lately? No         4/22/2025   Urinary Incontinence Screening   Bothered by leaking urine in past 6 months Yes       Today's PHQ-9 Score:       4/22/2025     4:05 PM   PHQ-9 SCORE   PHQ-9 Total Score MyChart 0   PHQ-9 Total Score 0        Patient-reported          4/22/2025   Substance Use   Alcohol more than 3/day or more than 7/wk Not Applicable   Do you have a current opioid prescription? No   How severe/bad is pain from 1 to 10? 0/10 (No Pain)   Do you use any other substances recreationally? No     Social History     Tobacco Use    Smoking status: Never     Passive exposure: Never    Smokeless tobacco: Never   Vaping Use    Vaping status: Never Used   Substance Use Topics    Alcohol use: No    Drug use: No           4/15/2025   LAST FHS-7 RESULTS   1st degree relative breast or ovarian cancer No   Any relative bilateral breast cancer No   Any male have breast cancer No   Any ONE woman have BOTH breast AND ovarian cancer No   Any woman with breast cancer before 50yrs No   2 or more relatives with breast AND/OR ovarian cancer No   2 or more relatives with breast AND/OR bowel cancer No              History of abnormal Pap smear: No - age 30- 64 PAP with HPV every 5 years recommended        Latest Ref Rng & Units 4/17/2019    10:04 AM   PAP / HPV   PAP Negative for squamous intraepithelial lesion or malignancy. Negative for squamous intraepithelial lesion or malignancy  Electronically signed by Chioma Santiago CT (ASCP) on 4/25/2019 at 12:21 PM      HPV 16 DNA NEG Negative    HPV 18 DNA NEG Negative    Other HR HPV NEG Negative      ASCVD Risk   The ASCVD Risk score ([place] DK, et al., 2019) failed to calculate for the following reasons:    The valid total cholesterol range is 130 to 320 mg/dL        4/22/2025   Contraception/Family Planning   Questions about contraception or family planning No         Reviewed and updated as needed this visit by Provider                      Current providers sharing in care for this patient include:  Patient Care Team:  Akilah Navarro PA-C as PCP - General (Family Medicine)  Akilah Navarro PA-C as Assigned PCP  Nupur Oneill LICSW as Assigned Behavioral Health Provider    The following health maintenance items are  reviewed in Epic and correct as of today:  Health Maintenance   Topic Date Due    DIABETIC FOOT EXAM  07/05/2023    HPV TEST  04/17/2024    PAP  04/17/2024    HEPATITIS B IMMUNIZATION (2 of 3 - 19+ 3-dose series) 05/17/2024    ANNUAL REVIEW OF HM ORDERS  03/08/2025    EYE EXAM  04/05/2025    MEDICARE ANNUAL WELLNESS VISIT  04/19/2025    MICROALBUMIN  06/05/2025    A1C  10/17/2025    LIPID  12/02/2025    BMP  04/17/2026    COLORECTAL CANCER SCREENING  01/03/2027    ZOSTER IMMUNIZATION (1 of 2) 03/01/2027    MAMMO SCREENING  04/15/2027    DTAP/TDAP/TD IMMUNIZATION (3 - Td or Tdap) 01/16/2029    ADVANCE CARE PLANNING  04/19/2029    HEPATITIS C SCREENING  Completed    HIV SCREENING  Completed    PHQ-2 (once per calendar year)  Completed    INFLUENZA VACCINE  Completed    Pneumococcal Vaccine: Pediatrics (0 to 5 Years) and At-Risk Patients (6 to 49 Years)  Completed    COVID-19 Vaccine  Completed    HPV IMMUNIZATION  Aged Out    MENINGITIS IMMUNIZATION  Aged Out         Review of Systems  Constitutional, HEENT, cardiovascular, pulmonary, gi and gu systems are negative, except as otherwise noted.     Objective    Exam  /72   Pulse 67   Temp 97.5  F (36.4  C)   Resp 18   Ht 1.524 m (5')   Wt 61.7 kg (136 lb)   LMP  (LMP Unknown)   SpO2 98%   BMI 26.56 kg/m     Estimated body mass index is 26.56 kg/m  as calculated from the following:    Height as of this encounter: 1.524 m (5').    Weight as of this encounter: 61.7 kg (136 lb).    Physical Exam  GENERAL: alert and no distress  EYES: Eyes grossly normal to inspection, PERRL and conjunctivae and sclerae normal  HENT: ear canals and TM's normal, nose and mouth without ulcers or lesions  NECK: no adenopathy, no asymmetry, masses, or scars  RESP: lungs clear to auscultation - no rales, rhonchi or wheezes  CV: regular rate and rhythm, normal S1 S2, no S3 or S4, no murmur, click or rub, no peripheral edema  ABDOMEN: soft, nontender, no hepatosplenomegaly, no masses  and bowel sounds normal  MS: no gross musculoskeletal defects noted, no edema  SKIN: no suspicious lesions or rashes  NEURO: Normal strength and tone, mentation intact and speech normal  PSYCH: mentation appears normal, affect normal/bright         4/22/2025   Mini Cog   Clock Draw Score 2 Normal   3 Item Recall 2 objects recalled   Mini Cog Total Score 4              Signed Electronically by: Akilah Navarro PA-C    Answers submitted by the patient for this visit:  Patient Health Questionnaire (Submitted on 4/22/2025)  If you checked off any problems, how difficult have these problems made it for you to do your work, take care of things at home, or get along with other people?: Not difficult at all  PHQ9 TOTAL SCORE: 0  Patient Health Questionnaire (G7) (Submitted on 4/22/2025)  FOUZIA 7 TOTAL SCORE: 5

## 2025-04-23 NOTE — PROGRESS NOTES
The add-on test Follicle stimulating hormone that was requested on 04/22/25 is unable to be completed due to sample unavailability . Future order is available for collection. If labs are needed before next appointment, please arrange for collection.

## 2025-05-02 ENCOUNTER — TRANSFERRED RECORDS (OUTPATIENT)
Dept: HEALTH INFORMATION MANAGEMENT | Facility: CLINIC | Age: 50
End: 2025-05-02
Payer: MEDICARE

## 2025-05-02 LAB — RETINOPATHY: NEGATIVE

## 2025-05-06 DIAGNOSIS — E11.9 TYPE 2 DIABETES MELLITUS WITHOUT COMPLICATION, WITHOUT LONG-TERM CURRENT USE OF INSULIN (H): ICD-10-CM

## 2025-05-06 NOTE — TELEPHONE ENCOUNTER
Refill Request  Medication name: Pending Prescriptions:                       Disp   Refills    metFORMIN (GLUCOPHAGE) 1000 MG tablet     180 ta*1            Sig: Take 1 tablet (1,000 mg) by mouth 2 times daily           (with meals).    Requested Pharmacy:  [id number] - [place] - [address] AT [facility] [address] [place]

## 2025-05-06 NOTE — PROGRESS NOTES
PHYSICAL THERAPY EVALUATION  Type of Visit: Evaluation        Fall Risk Screen:       Subjective         Presenting condition or subjective complaint:        The patient reports to therapy with a chief complaint of R knee pain. Hurts at work sometimes and gets pain while at work when she bends down or kneels. Is painful during that movement and then does tend to get better. Working at [employer] and [employer] and then also works 2-7 some days. Sometimes the knee is a bit sore by the end of the day.     Date of onset: 03/26/25 (MD order)    Relevant medical history:     Dates & types of surgery:      Prior diagnostic imaging/testing results:       MRI:   1.  Mild fluid distention of the prepatellar bursa with surrounding edema in the prepatellar soft tissues. Appearance is most suggestive of prepatellar bursitis.  2.  Osteochondral abnormality of the lateral patella with large area of cystic change in the subchondral bone.  3.  Cruciate and collateral ligaments are intact.  4.  Fluid within the deep MCL and pes anserine bursa which may also reflect bursitis.  5.  Focal edema within the superior lateral aspect of the infrapatellar fat pad which can be associated with patellar maltracking/fat pad impingement.    Prior therapy history for the same diagnosis, illness or injury:        Living Environment  Social support:     Type of home:     Stairs to enter the home:         Ramp:     Stairs inside the home:         Help at home:    Equipment owned:       Employment:      Hobbies/Interests:      Patient goals for therapy:      Pain assessment: See objective evaluation for additional pain details     Objective   KNEE EVALUATION  PAIN: Pain Level at Rest: 1/10  Pain Level with Use: 5/10  Pain Location: knee  INTEGUMENTARY (edema, incisions): swelling on prepatellar bursa  GAIT:  Gait Deviations: WNL  ROM: AROM WNL    STRENGTH: hamstring 4/5, quad 4/5 with R VMO atrophy   SPECIAL TESTS: positive patellar compression, lateral  patellar location       Assessment & Plan   CLINICAL IMPRESSIONS  Medical Diagnosis: Right Knee Pain    Treatment Diagnosis: Right Patellofemoral Pain   Impression/Assessment: Patient is a 50 year old female with knee complaints.  The following significant findings have been identified: Pain, Decreased ROM/flexibility, Decreased joint mobility, Decreased strength, Inflammation, Edema, Impaired gait, and Impaired muscle performance. These impairments interfere with their ability to perform self care tasks, work tasks, recreational activities, household chores, driving , household mobility, and community mobility as compared to previous level of function.     Clinical Decision Making (Complexity):  Clinical Presentation: Stable/Uncomplicated  Clinical Presentation Rationale: based on medical and personal factors listed in PT evaluation  Clinical Decision Making (Complexity): Low complexity    PLAN OF CARE  Treatment Interventions:  Modalities: Cryotherapy, Cupping, E-stim  Interventions: Gait Training, Manual Therapy, Neuromuscular Re-education, Therapeutic Activity, Therapeutic Exercise, Self-Care/Home Management    Long Term Goals     PT Goal 1  Goal Identifier: Ambulation  Goal Description: The patient will be able to ambulate x30 minutes with pain <3/10.  Rationale: to maximize safety and independence with performance of ADLs and functional tasks;to maximize safety and independence within the home;to maximize safety and independence within the community  Target Date: 08/04/25  PT Goal 2  Goal Identifier: Work activities  Goal Description: The patient will be able to perform necessary work duties with knee pain <3/10  Rationale: to maximize safety and independence with performance of ADLs and functional tasks;to maximize safety and independence within the home;to maximize safety and independence within the community  Target Date: 08/04/25      Frequency of Treatment: 1x/week tapering to 1x every 2 weeks  Duration  of Treatment: 90 days    Recommended Referrals to Other Professionals:   Education Assessment:   Learner/Method: Patient;Caregiver  Education Comments: Eager to participate in therapy. Patient demonstrates understanding of plan of care and consents to treatment.    Risks and benefits of evaluation/treatment have been explained.   Patient/Family/caregiver agrees with Plan of Care.     Evaluation Time:     PT Eval, Low Complexity Minutes (78525): 15       Signing Clinician: Carol Ng, PT        [facility]                                                                                   OUTPATIENT PHYSICAL THERAPY      PLAN OF TREATMENT FOR OUTPATIENT REHABILITATION   Patient's Last Name, First Name, Chioma Ham YOB: 1975   Provider's Name   [facility]   Medical Record No.  [id number]     Onset Date: 03/26/25 (MD order)  Start of Care Date: 05/07/25     Medical Diagnosis:  Right Knee Pain      PT Treatment Diagnosis:  Right Patellofemoral Pain Plan of Treatment  Frequency/Duration: 1x/week tapering to 1x every 2 weeks/ 90 days    Certification date from 05/07/25 to 08/04/25         See note for plan of treatment details and functional goals     Carol Ng, PT                         I CERTIFY THE NEED FOR THESE SERVICES FURNISHED UNDER        THIS PLAN OF TREATMENT AND WHILE UNDER MY CARE     (Physician attestation of this document indicates review and certification of the therapy plan).              Referring Provider:  Severiano Fernandes    Initial Assessment  See Epic Evaluation- Start of Care Date: 05/07/25

## 2025-05-07 ENCOUNTER — THERAPY VISIT (OUTPATIENT)
Dept: PHYSICAL THERAPY | Facility: REHABILITATION | Age: 50
End: 2025-05-07
Attending: NURSE PRACTITIONER
Payer: MEDICARE

## 2025-05-07 DIAGNOSIS — M13.161 INFLAMMATION OF JOINT OF RIGHT KNEE: ICD-10-CM

## 2025-05-07 PROCEDURE — 97110 THERAPEUTIC EXERCISES: CPT | Mod: GP | Performed by: PHYSICAL THERAPIST

## 2025-05-07 PROCEDURE — 97161 PT EVAL LOW COMPLEX 20 MIN: CPT | Mod: GP | Performed by: PHYSICAL THERAPIST

## 2025-05-07 PROCEDURE — 97530 THERAPEUTIC ACTIVITIES: CPT | Mod: GP | Performed by: PHYSICAL THERAPIST

## 2025-05-07 ASSESSMENT — ACTIVITIES OF DAILY LIVING (ADL)
PAIN: THE SYMPTOM AFFECTS MY ACTIVITY SLIGHTLY
PLEASE_INDICATE_YOR_PRIMARY_REASON_FOR_REFERRAL_TO_THERAPY:: KNEE
STIFFNESS: I DO NOT HAVE THE SYMPTOM

## 2025-05-09 ENCOUNTER — TRANSFERRED RECORDS (OUTPATIENT)
Dept: HEALTH INFORMATION MANAGEMENT | Facility: CLINIC | Age: 50
End: 2025-05-09
Payer: MEDICARE

## 2025-05-10 DIAGNOSIS — F41.1 ANXIETY STATE: ICD-10-CM

## 2025-05-13 RX ORDER — SERTRALINE HYDROCHLORIDE 100 MG/1
200 TABLET, FILM COATED ORAL DAILY
Qty: 180 TABLET | Refills: 3 | Status: SHIPPED | OUTPATIENT
Start: 2025-05-13

## 2025-05-14 ENCOUNTER — THERAPY VISIT (OUTPATIENT)
Dept: PHYSICAL THERAPY | Facility: REHABILITATION | Age: 50
End: 2025-05-14
Attending: NURSE PRACTITIONER
Payer: MEDICARE

## 2025-05-14 DIAGNOSIS — M13.161 INFLAMMATION OF JOINT OF RIGHT KNEE: Primary | ICD-10-CM

## 2025-05-14 PROCEDURE — 97110 THERAPEUTIC EXERCISES: CPT | Mod: GP | Performed by: PHYSICAL THERAPIST

## 2025-05-21 ENCOUNTER — THERAPY VISIT (OUTPATIENT)
Dept: PHYSICAL THERAPY | Facility: REHABILITATION | Age: 50
End: 2025-05-21
Attending: NURSE PRACTITIONER
Payer: MEDICARE

## 2025-05-21 DIAGNOSIS — M13.161 INFLAMMATION OF JOINT OF RIGHT KNEE: Primary | ICD-10-CM

## 2025-05-21 PROCEDURE — 97110 THERAPEUTIC EXERCISES: CPT | Mod: GP | Performed by: PHYSICAL THERAPIST

## 2025-06-06 PROBLEM — M13.161 INFLAMMATION OF JOINT OF RIGHT KNEE: Status: RESOLVED | Noted: 2025-05-07 | Resolved: 2025-06-06

## 2025-07-07 ENCOUNTER — TELEPHONE (OUTPATIENT)
Dept: FAMILY MEDICINE | Facility: CLINIC | Age: 50
End: 2025-07-07
Payer: MEDICARE

## 2025-07-21 DIAGNOSIS — Z00.00 HEALTHCARE MAINTENANCE: ICD-10-CM

## 2025-07-22 ENCOUNTER — LAB (OUTPATIENT)
Dept: LAB | Facility: CLINIC | Age: 50
End: 2025-07-22
Payer: MEDICARE

## 2025-07-22 DIAGNOSIS — E11.9 TYPE 2 DIABETES MELLITUS WITHOUT COMPLICATION, WITHOUT LONG-TERM CURRENT USE OF INSULIN (H): ICD-10-CM

## 2025-07-22 DIAGNOSIS — N92.6 MISSED PERIODS: ICD-10-CM

## 2025-07-22 LAB
EST. AVERAGE GLUCOSE BLD GHB EST-MCNC: 134 MG/DL
FSH SERPL IRP2-ACNC: 35.2 MIU/ML
HBA1C MFR BLD: 6.3 % (ref 0–5.6)

## 2025-07-22 PROCEDURE — 83036 HEMOGLOBIN GLYCOSYLATED A1C: CPT

## 2025-07-22 PROCEDURE — 36415 COLL VENOUS BLD VENIPUNCTURE: CPT

## 2025-07-22 PROCEDURE — 83001 ASSAY OF GONADOTROPIN (FSH): CPT

## 2025-07-22 RX ORDER — CALCIUM CARBONATE/VITAMIN D3 500MG-5MCG
1 TABLET ORAL 2 TIMES DAILY WITH MEALS
Qty: 60 TABLET | Refills: 5 | Status: SHIPPED | OUTPATIENT
Start: 2025-07-22

## 2025-08-10 DIAGNOSIS — E78.5 DYSLIPIDEMIA: ICD-10-CM

## 2025-08-12 RX ORDER — SIMVASTATIN 10 MG
10 TABLET ORAL AT BEDTIME
Qty: 90 TABLET | Refills: 2 | Status: SHIPPED | OUTPATIENT
Start: 2025-08-12

## 2025-08-13 ENCOUNTER — ALLIED HEALTH/NURSE VISIT (OUTPATIENT)
Dept: FAMILY MEDICINE | Facility: CLINIC | Age: 50
End: 2025-08-13
Payer: MEDICARE

## 2025-08-13 DIAGNOSIS — Z23 ENCOUNTER FOR IMMUNIZATION: Primary | ICD-10-CM

## 2025-08-13 PROCEDURE — G0010 ADMIN HEPATITIS B VACCINE: HCPCS

## 2025-08-13 PROCEDURE — 99207 PR NO CHARGE NURSE ONLY: CPT

## 2025-08-13 PROCEDURE — 90746 HEPB VACCINE 3 DOSE ADULT IM: CPT
